# Patient Record
Sex: FEMALE | Race: WHITE | Employment: OTHER | URBAN - METROPOLITAN AREA
[De-identification: names, ages, dates, MRNs, and addresses within clinical notes are randomized per-mention and may not be internally consistent; named-entity substitution may affect disease eponyms.]

---

## 2017-01-09 RX ORDER — LABETALOL 100 MG/1
TABLET, FILM COATED ORAL
Qty: 180 TABLET | Refills: 0 | Status: SHIPPED | OUTPATIENT
Start: 2017-01-09 | End: 2017-01-10 | Stop reason: SDUPTHER

## 2017-01-10 ENCOUNTER — OFFICE VISIT (OUTPATIENT)
Dept: FAMILY MEDICINE CLINIC | Age: 72
End: 2017-01-10

## 2017-01-10 VITALS
HEART RATE: 72 BPM | DIASTOLIC BLOOD PRESSURE: 84 MMHG | HEIGHT: 62 IN | SYSTOLIC BLOOD PRESSURE: 144 MMHG | BODY MASS INDEX: 39.64 KG/M2 | RESPIRATION RATE: 16 BRPM | WEIGHT: 215.4 LBS

## 2017-01-10 DIAGNOSIS — E88.81 METABOLIC SYNDROME: ICD-10-CM

## 2017-01-10 DIAGNOSIS — E66.9 OBESITY (BMI 30-39.9): ICD-10-CM

## 2017-01-10 DIAGNOSIS — I10 HTN, GOAL BELOW 150/90: Primary | ICD-10-CM

## 2017-01-10 PROCEDURE — 99214 OFFICE O/P EST MOD 30 MIN: CPT | Performed by: INTERNAL MEDICINE

## 2017-01-10 RX ORDER — HYDROCHLOROTHIAZIDE 12.5 MG/1
12.5 CAPSULE, GELATIN COATED ORAL DAILY
Qty: 90 CAPSULE | Refills: 3 | Status: SHIPPED | OUTPATIENT
Start: 2017-01-10 | End: 2017-12-13 | Stop reason: SDUPTHER

## 2017-01-10 RX ORDER — AMLODIPINE BESYLATE 10 MG/1
10 TABLET ORAL DAILY
Qty: 90 TABLET | Refills: 3 | Status: SHIPPED | OUTPATIENT
Start: 2017-01-10 | End: 2017-12-13 | Stop reason: SDUPTHER

## 2017-01-10 RX ORDER — LABETALOL 100 MG/1
100 TABLET, FILM COATED ORAL 2 TIMES DAILY
Qty: 180 TABLET | Refills: 3 | Status: SHIPPED | OUTPATIENT
Start: 2017-01-10 | End: 2017-06-16 | Stop reason: SDUPTHER

## 2017-01-10 ASSESSMENT — PATIENT HEALTH QUESTIONNAIRE - PHQ9
2. FEELING DOWN, DEPRESSED OR HOPELESS: 0
SUM OF ALL RESPONSES TO PHQ9 QUESTIONS 1 & 2: 0
1. LITTLE INTEREST OR PLEASURE IN DOING THINGS: 0
SUM OF ALL RESPONSES TO PHQ QUESTIONS 1-9: 0

## 2017-01-10 ASSESSMENT — ENCOUNTER SYMPTOMS
GASTROINTESTINAL NEGATIVE: 1
ALLERGIC/IMMUNOLOGIC NEGATIVE: 1
RESPIRATORY NEGATIVE: 1

## 2017-01-24 ENCOUNTER — HOSPITAL ENCOUNTER (OUTPATIENT)
Dept: OTHER | Age: 72
Discharge: HOME | End: 2017-01-24
Attending: INTERNAL MEDICINE | Admitting: INTERNAL MEDICINE

## 2017-01-24 VITALS — HEIGHT: 64 IN | WEIGHT: 216 LBS | BODY MASS INDEX: 36.88 KG/M2

## 2017-06-16 RX ORDER — LABETALOL 100 MG/1
100 TABLET, FILM COATED ORAL 2 TIMES DAILY
Qty: 60 TABLET | Refills: 0 | Status: SHIPPED | OUTPATIENT
Start: 2017-06-16 | End: 2017-07-30 | Stop reason: SDUPTHER

## 2017-07-31 RX ORDER — LABETALOL 100 MG/1
TABLET, FILM COATED ORAL
Qty: 60 TABLET | Refills: 1 | Status: SHIPPED | OUTPATIENT
Start: 2017-07-31 | End: 2017-08-28 | Stop reason: SDUPTHER

## 2017-08-28 ENCOUNTER — OFFICE VISIT (OUTPATIENT)
Dept: FAMILY MEDICINE CLINIC | Age: 72
End: 2017-08-28

## 2017-08-28 VITALS
WEIGHT: 200.4 LBS | SYSTOLIC BLOOD PRESSURE: 122 MMHG | HEIGHT: 64 IN | OXYGEN SATURATION: 98 % | DIASTOLIC BLOOD PRESSURE: 80 MMHG | RESPIRATION RATE: 16 BRPM | BODY MASS INDEX: 34.21 KG/M2 | HEART RATE: 65 BPM

## 2017-08-28 DIAGNOSIS — E88.81 METABOLIC SYNDROME: ICD-10-CM

## 2017-08-28 DIAGNOSIS — I10 HTN, GOAL BELOW 150/90: Primary | ICD-10-CM

## 2017-08-28 DIAGNOSIS — Z86.718 HISTORY OF DVT OF LOWER EXTREMITY: ICD-10-CM

## 2017-08-28 DIAGNOSIS — E66.9 OBESITY (BMI 30-39.9): ICD-10-CM

## 2017-08-28 PROCEDURE — 99214 OFFICE O/P EST MOD 30 MIN: CPT | Performed by: INTERNAL MEDICINE

## 2017-08-28 RX ORDER — LABETALOL 100 MG/1
100 TABLET, FILM COATED ORAL 2 TIMES DAILY
Qty: 180 TABLET | Refills: 3 | Status: SHIPPED | OUTPATIENT
Start: 2017-08-28 | End: 2018-04-06 | Stop reason: SDUPTHER

## 2017-08-28 ASSESSMENT — ENCOUNTER SYMPTOMS
ALLERGIC/IMMUNOLOGIC NEGATIVE: 1
RESPIRATORY NEGATIVE: 1

## 2017-12-13 RX ORDER — AMLODIPINE BESYLATE 10 MG/1
10 TABLET ORAL DAILY
Qty: 90 TABLET | Refills: 1 | Status: SHIPPED | OUTPATIENT
Start: 2017-12-13 | End: 2018-04-06 | Stop reason: SDUPTHER

## 2017-12-13 RX ORDER — HYDROCHLOROTHIAZIDE 12.5 MG/1
12.5 CAPSULE, GELATIN COATED ORAL DAILY
Qty: 90 CAPSULE | Refills: 1 | Status: SHIPPED | OUTPATIENT
Start: 2017-12-13 | End: 2018-04-06 | Stop reason: SDUPTHER

## 2018-04-09 RX ORDER — LABETALOL 100 MG/1
TABLET, FILM COATED ORAL
Qty: 180 TABLET | Refills: 1 | Status: SHIPPED | OUTPATIENT
Start: 2018-04-09 | End: 2019-01-26 | Stop reason: SDUPTHER

## 2018-04-09 RX ORDER — AMLODIPINE BESYLATE 10 MG/1
10 TABLET ORAL DAILY
Qty: 90 TABLET | Refills: 1 | Status: SHIPPED | OUTPATIENT
Start: 2018-04-09 | End: 2018-10-09 | Stop reason: SDUPTHER

## 2018-04-09 RX ORDER — HYDROCHLOROTHIAZIDE 12.5 MG/1
12.5 CAPSULE, GELATIN COATED ORAL DAILY
Qty: 90 CAPSULE | Refills: 1 | Status: SHIPPED | OUTPATIENT
Start: 2018-04-09 | End: 2018-10-11 | Stop reason: SDUPTHER

## 2018-04-12 ENCOUNTER — OFFICE VISIT (OUTPATIENT)
Dept: FAMILY MEDICINE CLINIC | Age: 73
End: 2018-04-12

## 2018-04-12 VITALS
OXYGEN SATURATION: 96 % | HEART RATE: 63 BPM | BODY MASS INDEX: 34.72 KG/M2 | HEIGHT: 64 IN | WEIGHT: 203.4 LBS | SYSTOLIC BLOOD PRESSURE: 144 MMHG | DIASTOLIC BLOOD PRESSURE: 84 MMHG | RESPIRATION RATE: 18 BRPM

## 2018-04-12 DIAGNOSIS — I10 HTN, GOAL BELOW 150/90: ICD-10-CM

## 2018-04-12 DIAGNOSIS — R53.83 FATIGUE, UNSPECIFIED TYPE: Primary | ICD-10-CM

## 2018-04-12 DIAGNOSIS — E88.81 METABOLIC SYNDROME: ICD-10-CM

## 2018-04-12 DIAGNOSIS — E78.6 LOW LEVEL OF HIGH DENSITY LIPOPROTEIN (HDL): ICD-10-CM

## 2018-04-12 LAB
ALBUMIN SERPL-MCNC: 4.3 G/DL (ref 3.4–5)
ALP BLD-CCNC: 94 U/L (ref 40–129)
ALT SERPL-CCNC: 13 U/L (ref 10–40)
ANION GAP SERPL CALCULATED.3IONS-SCNC: 15 MMOL/L (ref 3–16)
AST SERPL-CCNC: 15 U/L (ref 15–37)
BASOPHILS ABSOLUTE: 0.1 K/UL (ref 0–0.2)
BASOPHILS RELATIVE PERCENT: 0.9 %
BILIRUB SERPL-MCNC: 0.5 MG/DL (ref 0–1)
BILIRUBIN DIRECT: <0.2 MG/DL (ref 0–0.3)
BILIRUBIN, INDIRECT: NORMAL MG/DL (ref 0–1)
BUN BLDV-MCNC: 17 MG/DL (ref 7–20)
CALCIUM SERPL-MCNC: 9.6 MG/DL (ref 8.3–10.6)
CHLORIDE BLD-SCNC: 98 MMOL/L (ref 99–110)
CHOLESTEROL, TOTAL: 172 MG/DL (ref 0–199)
CO2: 27 MMOL/L (ref 21–32)
CREAT SERPL-MCNC: 0.9 MG/DL (ref 0.6–1.2)
EOSINOPHILS ABSOLUTE: 0.2 K/UL (ref 0–0.6)
EOSINOPHILS RELATIVE PERCENT: 2.9 %
GFR AFRICAN AMERICAN: >60
GFR NON-AFRICAN AMERICAN: >60
GLUCOSE BLD-MCNC: 93 MG/DL (ref 70–99)
HCT VFR BLD CALC: 45.2 % (ref 36–48)
HDLC SERPL-MCNC: 48 MG/DL (ref 40–60)
HEMOGLOBIN: 15.2 G/DL (ref 12–16)
LDL CHOLESTEROL CALCULATED: 105 MG/DL
LYMPHOCYTES ABSOLUTE: 2.1 K/UL (ref 1–5.1)
LYMPHOCYTES RELATIVE PERCENT: 29.3 %
MCH RBC QN AUTO: 28 PG (ref 26–34)
MCHC RBC AUTO-ENTMCNC: 33.6 G/DL (ref 31–36)
MCV RBC AUTO: 83.6 FL (ref 80–100)
MONOCYTES ABSOLUTE: 0.5 K/UL (ref 0–1.3)
MONOCYTES RELATIVE PERCENT: 6.7 %
NEUTROPHILS ABSOLUTE: 4.3 K/UL (ref 1.7–7.7)
NEUTROPHILS RELATIVE PERCENT: 60.2 %
PDW BLD-RTO: 15.3 % (ref 12.4–15.4)
PLATELET # BLD: 254 K/UL (ref 135–450)
PMV BLD AUTO: 8.9 FL (ref 5–10.5)
POTASSIUM SERPL-SCNC: 4.2 MMOL/L (ref 3.5–5.1)
RBC # BLD: 5.41 M/UL (ref 4–5.2)
SODIUM BLD-SCNC: 140 MMOL/L (ref 136–145)
TOTAL PROTEIN: 7.1 G/DL (ref 6.4–8.2)
TRIGL SERPL-MCNC: 95 MG/DL (ref 0–150)
TSH SERPL DL<=0.05 MIU/L-ACNC: 2.61 UIU/ML (ref 0.27–4.2)
VLDLC SERPL CALC-MCNC: 19 MG/DL
WBC # BLD: 7.2 K/UL (ref 4–11)

## 2018-04-12 PROCEDURE — 1123F ACP DISCUSS/DSCN MKR DOCD: CPT | Performed by: INTERNAL MEDICINE

## 2018-04-12 PROCEDURE — 1090F PRES/ABSN URINE INCON ASSESS: CPT | Performed by: INTERNAL MEDICINE

## 2018-04-12 PROCEDURE — 3014F SCREEN MAMMO DOC REV: CPT | Performed by: INTERNAL MEDICINE

## 2018-04-12 PROCEDURE — 1036F TOBACCO NON-USER: CPT | Performed by: INTERNAL MEDICINE

## 2018-04-12 PROCEDURE — G8400 PT W/DXA NO RESULTS DOC: HCPCS | Performed by: INTERNAL MEDICINE

## 2018-04-12 PROCEDURE — 99214 OFFICE O/P EST MOD 30 MIN: CPT | Performed by: INTERNAL MEDICINE

## 2018-04-12 PROCEDURE — 36415 COLL VENOUS BLD VENIPUNCTURE: CPT | Performed by: INTERNAL MEDICINE

## 2018-04-12 PROCEDURE — G8427 DOCREV CUR MEDS BY ELIG CLIN: HCPCS | Performed by: INTERNAL MEDICINE

## 2018-04-12 PROCEDURE — 4040F PNEUMOC VAC/ADMIN/RCVD: CPT | Performed by: INTERNAL MEDICINE

## 2018-04-12 PROCEDURE — 3017F COLORECTAL CA SCREEN DOC REV: CPT | Performed by: INTERNAL MEDICINE

## 2018-04-12 PROCEDURE — G8417 CALC BMI ABV UP PARAM F/U: HCPCS | Performed by: INTERNAL MEDICINE

## 2018-04-12 ASSESSMENT — ENCOUNTER SYMPTOMS
RESPIRATORY NEGATIVE: 1
ALLERGIC/IMMUNOLOGIC NEGATIVE: 1
GASTROINTESTINAL NEGATIVE: 1

## 2018-07-12 ENCOUNTER — OFFICE VISIT (OUTPATIENT)
Dept: FAMILY MEDICINE CLINIC | Age: 73
End: 2018-07-12

## 2018-07-12 VITALS
RESPIRATION RATE: 12 BRPM | HEIGHT: 64 IN | SYSTOLIC BLOOD PRESSURE: 137 MMHG | DIASTOLIC BLOOD PRESSURE: 72 MMHG | TEMPERATURE: 98.7 F | HEART RATE: 60 BPM | BODY MASS INDEX: 34.52 KG/M2 | OXYGEN SATURATION: 100 % | WEIGHT: 202.2 LBS

## 2018-07-12 DIAGNOSIS — Z00.00 ROUTINE GENERAL MEDICAL EXAMINATION AT A HEALTH CARE FACILITY: Primary | ICD-10-CM

## 2018-07-12 DIAGNOSIS — Z12.39 SCREENING FOR BREAST CANCER: ICD-10-CM

## 2018-07-12 DIAGNOSIS — Z23 NEED FOR PNEUMOCOCCAL VACCINATION: ICD-10-CM

## 2018-07-12 DIAGNOSIS — Z12.31 ENCOUNTER FOR SCREENING MAMMOGRAM FOR BREAST CANCER: ICD-10-CM

## 2018-07-12 PROCEDURE — 90732 PPSV23 VACC 2 YRS+ SUBQ/IM: CPT | Performed by: PHYSICIAN ASSISTANT

## 2018-07-12 PROCEDURE — G0009 ADMIN PNEUMOCOCCAL VACCINE: HCPCS | Performed by: PHYSICIAN ASSISTANT

## 2018-07-12 PROCEDURE — 4040F PNEUMOC VAC/ADMIN/RCVD: CPT | Performed by: PHYSICIAN ASSISTANT

## 2018-07-12 PROCEDURE — G0438 PPPS, INITIAL VISIT: HCPCS | Performed by: PHYSICIAN ASSISTANT

## 2018-07-12 ASSESSMENT — ANXIETY QUESTIONNAIRES: GAD7 TOTAL SCORE: 0

## 2018-07-12 ASSESSMENT — PATIENT HEALTH QUESTIONNAIRE - PHQ9: SUM OF ALL RESPONSES TO PHQ QUESTIONS 1-9: 0

## 2018-07-12 ASSESSMENT — LIFESTYLE VARIABLES: HOW OFTEN DO YOU HAVE A DRINK CONTAINING ALCOHOL: 0

## 2018-07-12 NOTE — PATIENT INSTRUCTIONS
sandwiches. ¨ Add fruit to yogurt and cereal.  Enjoy food  · You can still eat your favorite foods. You just may need to eat less of them. If your favorite foods are high in fat, salt, and sugar, limit how often you eat them, but do not cut them out entirely. · Eat a wide variety of foods. Make healthy choices when eating out  · The type of restaurant you choose can help you make healthy choices. Even fast-food chains are now offering more low-fat or healthier choices on the menu. · Choose smaller portions, or take half of your meal home. · When eating out, try:  ¨ A veggie pizza with a whole wheat crust or grilled chicken (instead of sausage or pepperoni). ¨ Pasta with roasted vegetables, grilled chicken, or marinara sauce instead of cream sauce. ¨ A vegetable wrap or grilled chicken wrap. ¨ Broiled or poached food instead of fried or breaded items. Make healthy choices easy  · Buy packaged, prewashed, ready-to-eat fresh vegetables and fruits, such as baby carrots, salad mixes, and chopped or shredded broccoli and cauliflower. · Buy packaged, presliced fruits, such as melon or pineapple. · Choose 100% fruit or vegetable juice instead of soda. Limit juice intake to 4 to 6 oz (½ to ¾ cup) a day. · Blend low-fat yogurt, fruit juice, and canned or frozen fruit to make a smoothie for breakfast or a snack. Where can you learn more? Go to https://[email or web address] org and sign in to your [employer] account. Enter Y970 in the [facility] box to learn more about \"Eating Healthy Foods: Care Instructions. \"     If you do not have an account, please click on the \"Sign Up Now\" link. Current as of: May 12, 2017  Content Version: 11.6  © 1788-4299 [employer]. Care instructions adapted under license by [facility] [facility]  If you have questions about a medical condition or this instruction, always ask your healthcare professional. Judith Celis disclaims any warranty or

## 2018-07-12 NOTE — PROGRESS NOTES
Medicare Annual Wellness Visit  Name: Mayelin Oneil Date: 2018   MRN: [id number] Sex: Female   Age: 72 y.o. Ethnicity: Non-/Non    : 1945 Race: Catherine Lo is here for Medicare AWV    Screenings for behavioral, psychosocial and functional/safety risks, and cognitive dysfunction are all negative except as indicated below. These results, as well as other patient data from the [address] form, are documented in Flowsheets linked to this Encounter. No Known Allergies  Prior to Visit Medications    Medication Sig Taking?  Authorizing Provider   amLODIPine (NORVASC) 10 MG tablet TAKE 1 TABLET BY MOUTH  DAILY Yes Caroline Oro MD   hydrochlorothiazide (MICROZIDE) 12.5 MG capsule TAKE 1 CAPSULE BY MOUTH  DAILY Yes TAYLOR Hoover MD   labetalol (NORMODYNE) 100 MG tablet TAKE 1 TABLET BY MOUTH TWO  TIMES DAILY Yes Caroline Oro MD   aspirin 81 MG tablet Take 1 tablet by mouth daily Yes Caroline Oro MD     Past Medical History:   Diagnosis Date    Hx of blood clots     Hypertension      Past Surgical History:   Procedure Laterality Date    COLONOSCOPY      COLONOSCOPY  16    colon polyp    HYSTERECTOMY       Family History   Problem Relation Age of Onset    Cancer Mother     Coronary Art Dis Father        CareTeam (Including outside providers/suppliers regularly involved in providing care):   Patient Care Team:  Caroline Oro MD as PCP - General (Internal Medicine)  Kita Callejas MD as PCP - Hematology/Oncology (Medical Oncology)  Caroline Oro MD as PCP - S Attributed Provider    Wt Readings from Last 3 Encounters:   18 202 lb 3.2 oz (91.7 kg)   18 203 lb 6.4 oz (92.3 kg)   17 200 lb 6.4 oz (90.9 kg)     Vitals:    18 0757   BP: 137/72   Pulse: 60   Resp: 12   Temp: 98.7 °F (37.1 °C)   TempSrc: Oral   SpO2: 100%   Weight: 202 lb 3.2 oz (91.7 kg)   Height: 5' 4\" (1.626 m)       General Appearance: alert and oriented to person, place and time, well developed and well- nourished, in no acute distress  Skin: warm and dry, no rash or erythema  Head: normocephalic and atraumatic  Eyes: pupils equal, round, and reactive to light, extraocular eye movements intact, conjunctivae normal  ENT: tympanic membrane, external ear and left ear canal normal , right ear canal with cerumen, nose without deformity, nasal mucosa and turbinates normal without polyps  Neck: supple and non-tender without mass, no thyromegaly or thyroid nodules, no cervical lymphadenopathy  Pulmonary/Chest: clear to auscultation bilaterally- no wheezes, rales or rhonchi, normal air movement, no respiratory distress  Cardiovascular: normal rate, regular rhythm, normal S1 and S2, no murmurs, rubs, clicks, or gallops, distal pulses intact, no carotid bruits  Abdomen: soft, non-tender, non-distended, normal bowel sounds, no masses or organomegaly  Extremities: no cyanosis, clubbing or edema  Musculoskeletal: normal range of motion, no joint swelling, deformity or tenderness  Neurologic: reflexes normal and symmetric, no cranial nerve deficit, gait, coordination and speech normal    Patient's complete Health Risk Assessment and screening values have been reviewed and are found in Flowsheets. The following problems were reviewed today and where indicated follow up appointments were made and/or referrals ordered. Positive Risk Factor Screenings with Interventions:     Health Habits/Nutrition:  Health Habits/Nutrition  Do you exercise for at least 20 minutes 2-3 times per week?: Yes  Have you lost any weight without trying in the past 3 months?: No  Do you eat fewer than 2 meals per day?: No  Have you seen a dentist within the past year?: (!) No  Body mass index is 34.71 kg/m². Health Habits/Nutrition Interventions:  · Discussed importance of diet full of veggies and fruits and regular exercise.   · Dental exam overdue:  patient encouraged to make appointment with his/her dentist    Hearing/Vision:  Hearing/Vision  Do you or your family notice any trouble with your hearing?: No  Do you have difficulty driving, watching TV, or doing any of your daily activities because of your eyesight?: No  Have you had an eye exam within the past year?: (!) No  Hearing/Vision Interventions:  · None indicated    Safety:  Safety  Do you have working smoke detectors?: (!) No  Have all throw rugs been removed or fastened?: (!) No  Do you have non-slip mats in all bathtubs?: (!) No  Do all of your stairways have a railing or banister?: Yes  Are your doorways, halls and stairs free of clutter?: Yes  Do you always fasten your seatbelt when you are in a car?: Yes  Safety Interventions:  · None indicated- has carpets thruout her apt building. No carpet or rugs in bathroom     Personalized Preventive Plan   Current Health Maintenance Status  Immunization History   Administered Date(s) Administered    Influenza Vaccine, unspecified formulation 09/06/2016    Influenza Whole 09/01/2014    Influenza, High Dose 09/15/2015, 09/11/2017    Pneumococcal 13-valent Conjugate (Leopoldo Oneal) 03/30/2016        Health Maintenance   Topic Date Due    Hepatitis C screen  1945    DTaP/Tdap/Td vaccine (1 - Tdap) 04/21/1966    Shingles Vaccine (1 of 2 - 2 Dose Series) 04/21/1997    Colon cancer screen colonoscopy  04/21/1997    DEXA (modify frequency per FRAX score)  04/21/2012    Pneumococcal low/med risk (2 of 2 - PPSV23) 03/30/2017    Breast cancer screen  02/02/2018    Flu vaccine (1) 09/01/2018    Potassium monitoring  04/12/2019    Creatinine monitoring  04/12/2019    Lipid screen  04/12/2023     Recommendations for Preventive Services Due: see orders. Recommended screening schedule for the next 5-10 years is provided to the patient in written form: see Patient Instructions/AVS.      PLAN:  1. Screening mammogram ordered. 2. Second pneumonia vaccine administered  3.  Discussed diet, exercise, and

## 2018-07-18 ENCOUNTER — HOSPITAL ENCOUNTER (OUTPATIENT)
Dept: WOMENS IMAGING | Age: 73
Discharge: HOME | End: 2018-07-18
Payer: MEDICARE

## 2018-07-18 DIAGNOSIS — Z12.31 VISIT FOR SCREENING MAMMOGRAM: ICD-10-CM

## 2018-07-18 PROCEDURE — 77067 SCR MAMMO BI INCL CAD: CPT

## 2018-07-19 ENCOUNTER — TELEPHONE (OUTPATIENT)
Dept: FAMILY MEDICINE CLINIC | Age: 73
End: 2018-07-19

## 2018-07-19 DIAGNOSIS — R92.8 ABNORMAL MAMMOGRAM: Primary | ICD-10-CM

## 2018-07-19 NOTE — TELEPHONE ENCOUNTER
----- Message from Pepito Mario MD sent at 7/19/2018  1:38 PM EDT -----  incmplete edna. Make sure added views are ordered/done.

## 2018-07-23 ENCOUNTER — HOSPITAL ENCOUNTER (OUTPATIENT)
Dept: WOMENS IMAGING | Age: 73
Discharge: HOME | End: 2018-07-23
Payer: MEDICARE

## 2018-07-23 DIAGNOSIS — R92.8 ABNORMAL MAMMOGRAM: ICD-10-CM

## 2018-07-23 PROCEDURE — 76642 ULTRASOUND BREAST LIMITED: CPT

## 2018-10-09 RX ORDER — AMLODIPINE BESYLATE 10 MG/1
10 TABLET ORAL DAILY
Qty: 90 TABLET | Refills: 3 | Status: SHIPPED | OUTPATIENT
Start: 2018-10-09 | End: 2019-09-28 | Stop reason: SDUPTHER

## 2018-10-11 ENCOUNTER — OFFICE VISIT (OUTPATIENT)
Dept: FAMILY MEDICINE CLINIC | Age: 73
End: 2018-10-11
Payer: MEDICARE

## 2018-10-11 VITALS
DIASTOLIC BLOOD PRESSURE: 76 MMHG | WEIGHT: 194 LBS | BODY MASS INDEX: 33.12 KG/M2 | HEIGHT: 64 IN | RESPIRATION RATE: 16 BRPM | OXYGEN SATURATION: 97 % | SYSTOLIC BLOOD PRESSURE: 128 MMHG | HEART RATE: 59 BPM

## 2018-10-11 DIAGNOSIS — E66.9 OBESITY (BMI 30-39.9): ICD-10-CM

## 2018-10-11 DIAGNOSIS — I10 HTN, GOAL BELOW 150/90: ICD-10-CM

## 2018-10-11 DIAGNOSIS — E88.81 METABOLIC SYNDROME: ICD-10-CM

## 2018-10-11 PROCEDURE — 99213 OFFICE O/P EST LOW 20 MIN: CPT | Performed by: INTERNAL MEDICINE

## 2018-10-11 PROCEDURE — 3017F COLORECTAL CA SCREEN DOC REV: CPT | Performed by: INTERNAL MEDICINE

## 2018-10-11 PROCEDURE — 1036F TOBACCO NON-USER: CPT | Performed by: INTERNAL MEDICINE

## 2018-10-11 PROCEDURE — G8482 FLU IMMUNIZE ORDER/ADMIN: HCPCS | Performed by: INTERNAL MEDICINE

## 2018-10-11 PROCEDURE — 4040F PNEUMOC VAC/ADMIN/RCVD: CPT | Performed by: INTERNAL MEDICINE

## 2018-10-11 PROCEDURE — 1090F PRES/ABSN URINE INCON ASSESS: CPT | Performed by: INTERNAL MEDICINE

## 2018-10-11 PROCEDURE — 1101F PT FALLS ASSESS-DOCD LE1/YR: CPT | Performed by: INTERNAL MEDICINE

## 2018-10-11 PROCEDURE — G8400 PT W/DXA NO RESULTS DOC: HCPCS | Performed by: INTERNAL MEDICINE

## 2018-10-11 PROCEDURE — 1123F ACP DISCUSS/DSCN MKR DOCD: CPT | Performed by: INTERNAL MEDICINE

## 2018-10-11 PROCEDURE — G8417 CALC BMI ABV UP PARAM F/U: HCPCS | Performed by: INTERNAL MEDICINE

## 2018-10-11 PROCEDURE — G8427 DOCREV CUR MEDS BY ELIG CLIN: HCPCS | Performed by: INTERNAL MEDICINE

## 2018-10-11 RX ORDER — HYDROCHLOROTHIAZIDE 12.5 MG/1
12.5 CAPSULE, GELATIN COATED ORAL DAILY
Qty: 90 CAPSULE | Refills: 1 | Status: SHIPPED | OUTPATIENT
Start: 2018-10-11 | End: 2019-02-05 | Stop reason: SDUPTHER

## 2018-10-11 ASSESSMENT — ENCOUNTER SYMPTOMS
ALLERGIC/IMMUNOLOGIC NEGATIVE: 1
GASTROINTESTINAL NEGATIVE: 1
RESPIRATORY NEGATIVE: 1

## 2018-10-11 NOTE — PROGRESS NOTES
Subjective:      Patient ID: Margot Alvarado is a 72 y.o. female. HPI  HTN, goal below 150/90  No change in meds, no c/o with meds, no chest pain, SOB, palpatations, or syncope. Home bp was 110-120s/70s. Obesity (BMI 30-39. 9)  BMI is improved w/ very good wt loss. No real change in diet per pt. Metabolic syndrome  No recent change in diet or wt. Past Medical History:   Diagnosis Date    Hx of blood clots 2015    Hypertension      Current Outpatient Prescriptions   Medication Sig Dispense Refill    hydrochlorothiazide (MICROZIDE) 12.5 MG capsule Take 1 capsule by mouth daily 90 capsule 1    zoster recombinant adjuvanted vaccine (SHINGRIX) 50 MCG SUSR injection Inject 0.5 mLs into the muscle once for 1 dose 0.5 mL 0    Tdap (ADACEL) 5-2-15.5 LF-MCG/0.5 injection Inject 0.5 mLs into the muscle once for 1 dose 0.5 mL 0    amLODIPine (NORVASC) 10 MG tablet TAKE 1 TABLET BY MOUTH  DAILY 90 tablet 3    labetalol (NORMODYNE) 100 MG tablet TAKE 1 TABLET BY MOUTH TWO  TIMES DAILY 180 tablet 1    aspirin 81 MG tablet Take 1 tablet by mouth daily 90 tablet 3     No current facility-administered medications for this visit. No Known Allergies  Social History   Substance Use Topics    Smoking status: Never Smoker    Smokeless tobacco: Never Used    Alcohol use 0.0 oz/week      Comment: rarely     Family History   Problem Relation Age of Onset    Cancer Mother     Breast Cancer Mother     Coronary Art Dis Father        Review of Systems   Constitutional: Negative. Respiratory: Negative. Cardiovascular: Negative. Gastrointestinal: Negative. Endocrine: Negative. Genitourinary: Negative. Musculoskeletal: Negative. Skin: Negative. Allergic/Immunologic: Negative. Neurological: Negative. Hematological: Negative.       Vitals:    10/11/18 0836 10/11/18 0906   BP: 118/72 128/76   Pulse: 59    Resp: 16    SpO2: 97%    Weight: 194 lb (88 kg)    Height: 5' 4\" (1.626 m)

## 2019-01-28 RX ORDER — LABETALOL 100 MG/1
TABLET, FILM COATED ORAL
Qty: 180 TABLET | Refills: 1 | Status: SHIPPED | OUTPATIENT
Start: 2019-01-28 | End: 2019-04-04 | Stop reason: SDUPTHER

## 2019-02-05 RX ORDER — HYDROCHLOROTHIAZIDE 12.5 MG/1
12.5 CAPSULE, GELATIN COATED ORAL DAILY
Qty: 32 CAPSULE | Refills: 5 | Status: SHIPPED | OUTPATIENT
Start: 2019-02-05 | End: 2019-09-14 | Stop reason: SDUPTHER

## 2019-03-07 ENCOUNTER — OFFICE VISIT (OUTPATIENT)
Dept: FAMILY MEDICINE CLINIC | Age: 74
End: 2019-03-07
Payer: MEDICARE

## 2019-03-07 VITALS
BODY MASS INDEX: 34.76 KG/M2 | SYSTOLIC BLOOD PRESSURE: 142 MMHG | RESPIRATION RATE: 16 BRPM | WEIGHT: 203.6 LBS | HEIGHT: 64 IN | OXYGEN SATURATION: 98 % | HEART RATE: 67 BPM | DIASTOLIC BLOOD PRESSURE: 80 MMHG | TEMPERATURE: 97.7 F

## 2019-03-07 DIAGNOSIS — I10 ESSENTIAL HYPERTENSION: Primary | ICD-10-CM

## 2019-03-07 PROCEDURE — 1101F PT FALLS ASSESS-DOCD LE1/YR: CPT | Performed by: INTERNAL MEDICINE

## 2019-03-07 PROCEDURE — 4040F PNEUMOC VAC/ADMIN/RCVD: CPT | Performed by: INTERNAL MEDICINE

## 2019-03-07 PROCEDURE — 99214 OFFICE O/P EST MOD 30 MIN: CPT | Performed by: INTERNAL MEDICINE

## 2019-03-07 PROCEDURE — 3017F COLORECTAL CA SCREEN DOC REV: CPT | Performed by: INTERNAL MEDICINE

## 2019-03-07 PROCEDURE — 1090F PRES/ABSN URINE INCON ASSESS: CPT | Performed by: INTERNAL MEDICINE

## 2019-03-07 PROCEDURE — 1036F TOBACCO NON-USER: CPT | Performed by: INTERNAL MEDICINE

## 2019-03-07 PROCEDURE — G8400 PT W/DXA NO RESULTS DOC: HCPCS | Performed by: INTERNAL MEDICINE

## 2019-03-07 PROCEDURE — 1123F ACP DISCUSS/DSCN MKR DOCD: CPT | Performed by: INTERNAL MEDICINE

## 2019-03-07 PROCEDURE — G8427 DOCREV CUR MEDS BY ELIG CLIN: HCPCS | Performed by: INTERNAL MEDICINE

## 2019-03-07 PROCEDURE — G8510 SCR DEP NEG, NO PLAN REQD: HCPCS | Performed by: INTERNAL MEDICINE

## 2019-03-07 PROCEDURE — G8482 FLU IMMUNIZE ORDER/ADMIN: HCPCS | Performed by: INTERNAL MEDICINE

## 2019-03-07 PROCEDURE — G8417 CALC BMI ABV UP PARAM F/U: HCPCS | Performed by: INTERNAL MEDICINE

## 2019-03-07 ASSESSMENT — PATIENT HEALTH QUESTIONNAIRE - PHQ9
SUM OF ALL RESPONSES TO PHQ QUESTIONS 1-9: 0
SUM OF ALL RESPONSES TO PHQ QUESTIONS 1-9: 0
SUM OF ALL RESPONSES TO PHQ9 QUESTIONS 1 & 2: 0
1. LITTLE INTEREST OR PLEASURE IN DOING THINGS: 0
2. FEELING DOWN, DEPRESSED OR HOPELESS: 0

## 2019-04-04 ENCOUNTER — TELEPHONE (OUTPATIENT)
Dept: FAMILY MEDICINE CLINIC | Age: 74
End: 2019-04-04

## 2019-04-04 RX ORDER — LABETALOL 100 MG/1
100 TABLET, FILM COATED ORAL 2 TIMES DAILY
Qty: 180 TABLET | Refills: 1 | Status: SHIPPED | OUTPATIENT
Start: 2019-04-04 | End: 2019-08-10 | Stop reason: SDUPTHER

## 2019-04-04 NOTE — TELEPHONE ENCOUNTER
pts nurse called in and stated she is accidentally taking three of the amLODIPine (NORVASC) 10 MG tablet daily and she wanted to clarify the dosage. Also she has not been taking the labetalol (NORMODYNE) 100 MG tablet, she is out and requesting a refill. Patient requesting a medication refill.   Medication labetalol (NORMODYNE) 100 MG tablet   Pharmacy [facility]  Last office visit: 3/7/19  Next office visit: 4/11/2019

## 2019-04-11 ENCOUNTER — OFFICE VISIT (OUTPATIENT)
Dept: FAMILY MEDICINE CLINIC | Age: 74
End: 2019-04-11
Payer: MEDICARE

## 2019-04-11 VITALS
SYSTOLIC BLOOD PRESSURE: 126 MMHG | BODY MASS INDEX: 34.83 KG/M2 | HEIGHT: 64 IN | HEART RATE: 69 BPM | OXYGEN SATURATION: 97 % | RESPIRATION RATE: 16 BRPM | WEIGHT: 204 LBS | DIASTOLIC BLOOD PRESSURE: 76 MMHG

## 2019-04-11 DIAGNOSIS — R53.83 FATIGUE, UNSPECIFIED TYPE: ICD-10-CM

## 2019-04-11 DIAGNOSIS — I26.99 PULMONARY EMBOLISM, BILATERAL (HCC): ICD-10-CM

## 2019-04-11 DIAGNOSIS — E88.81 METABOLIC SYNDROME: ICD-10-CM

## 2019-04-11 DIAGNOSIS — E66.9 OBESITY (BMI 30-39.9): ICD-10-CM

## 2019-04-11 DIAGNOSIS — Z00.00 MEDICARE ANNUAL WELLNESS VISIT, SUBSEQUENT: Primary | ICD-10-CM

## 2019-04-11 DIAGNOSIS — E28.39 ESTROGEN DEFICIENCY: ICD-10-CM

## 2019-04-11 DIAGNOSIS — I10 HTN, GOAL BELOW 150/90: ICD-10-CM

## 2019-04-11 DIAGNOSIS — E78.6 LOW LEVEL OF HIGH DENSITY LIPOPROTEIN (HDL): ICD-10-CM

## 2019-04-11 DIAGNOSIS — Z11.59 NEED FOR HEPATITIS C SCREENING TEST: ICD-10-CM

## 2019-04-11 LAB
ALBUMIN SERPL-MCNC: 4.2 G/DL (ref 3.4–5)
ALP BLD-CCNC: 102 U/L (ref 40–129)
ALT SERPL-CCNC: 13 U/L (ref 10–40)
ANION GAP SERPL CALCULATED.3IONS-SCNC: 14 MMOL/L (ref 3–16)
AST SERPL-CCNC: 17 U/L (ref 15–37)
BILIRUB SERPL-MCNC: 0.3 MG/DL (ref 0–1)
BILIRUBIN DIRECT: <0.2 MG/DL (ref 0–0.3)
BILIRUBIN, INDIRECT: NORMAL MG/DL (ref 0–1)
BUN BLDV-MCNC: 24 MG/DL (ref 7–20)
CALCIUM SERPL-MCNC: 9.5 MG/DL (ref 8.3–10.6)
CHLORIDE BLD-SCNC: 98 MMOL/L (ref 99–110)
CHOLESTEROL, TOTAL: 159 MG/DL (ref 0–199)
CO2: 28 MMOL/L (ref 21–32)
CREAT SERPL-MCNC: 0.9 MG/DL (ref 0.6–1.2)
GFR AFRICAN AMERICAN: >60
GFR NON-AFRICAN AMERICAN: >60
GLUCOSE BLD-MCNC: 109 MG/DL (ref 70–99)
HCT VFR BLD CALC: 43.1 % (ref 36–48)
HDLC SERPL-MCNC: 48 MG/DL (ref 40–60)
HEMOGLOBIN: 14.2 G/DL (ref 12–16)
HEPATITIS C ANTIBODY INTERPRETATION: NORMAL
LDL CHOLESTEROL CALCULATED: 97 MG/DL
MCH RBC QN AUTO: 27.5 PG (ref 26–34)
MCHC RBC AUTO-ENTMCNC: 32.8 G/DL (ref 31–36)
MCV RBC AUTO: 84 FL (ref 80–100)
PDW BLD-RTO: 15.4 % (ref 12.4–15.4)
PHOSPHORUS: 3.5 MG/DL (ref 2.5–4.9)
PLATELET # BLD: 301 K/UL (ref 135–450)
PMV BLD AUTO: 8.4 FL (ref 5–10.5)
POTASSIUM SERPL-SCNC: 3.7 MMOL/L (ref 3.5–5.1)
RBC # BLD: 5.14 M/UL (ref 4–5.2)
SODIUM BLD-SCNC: 140 MMOL/L (ref 136–145)
TOTAL PROTEIN: 7.1 G/DL (ref 6.4–8.2)
TRIGL SERPL-MCNC: 72 MG/DL (ref 0–150)
TSH SERPL DL<=0.05 MIU/L-ACNC: 3.55 UIU/ML (ref 0.27–4.2)
VLDLC SERPL CALC-MCNC: 14 MG/DL
WBC # BLD: 8.3 K/UL (ref 4–11)

## 2019-04-11 PROCEDURE — 36415 COLL VENOUS BLD VENIPUNCTURE: CPT | Performed by: INTERNAL MEDICINE

## 2019-04-11 PROCEDURE — G0439 PPPS, SUBSEQ VISIT: HCPCS | Performed by: INTERNAL MEDICINE

## 2019-04-11 PROCEDURE — 4040F PNEUMOC VAC/ADMIN/RCVD: CPT | Performed by: INTERNAL MEDICINE

## 2019-04-11 PROCEDURE — 99214 OFFICE O/P EST MOD 30 MIN: CPT | Performed by: INTERNAL MEDICINE

## 2019-04-11 ASSESSMENT — PATIENT HEALTH QUESTIONNAIRE - PHQ9
SUM OF ALL RESPONSES TO PHQ QUESTIONS 1-9: 0
SUM OF ALL RESPONSES TO PHQ QUESTIONS 1-9: 0

## 2019-04-11 ASSESSMENT — ENCOUNTER SYMPTOMS
EYES NEGATIVE: 1
RESPIRATORY NEGATIVE: 1
ALLERGIC/IMMUNOLOGIC NEGATIVE: 1
GASTROINTESTINAL NEGATIVE: 1

## 2019-04-11 ASSESSMENT — ANXIETY QUESTIONNAIRES: GAD7 TOTAL SCORE: 0

## 2019-04-11 ASSESSMENT — LIFESTYLE VARIABLES: HOW OFTEN DO YOU HAVE A DRINK CONTAINING ALCOHOL: 0

## 2019-04-11 NOTE — PROGRESS NOTES
Subjective:      Patient ID: Cameron Mckeon is a 72 y.o. female. HPI Medicare annual wellness visit, subsequent  Mammo: 7/18/2018. Pap: s/p hysterectomy/oopherectomy   Colonoscopy: 4/2016, Tubular adenoma. rechx 5 yrs  DEXA: due  Eye: 4/2019. Hearing: passed in office exam  Immunnization: up to date except for Shingrix. Rx given. MMSE: 4/4 clock draw, 1/3 words, 27/30 MMSE  Get Up and Go: passed  Tob: nonsmoker, never  ETOH: rare  Caffeine: moderate  Cardiac Risk Assessment: labs pending. Living will:  yes  Medical power of : yes    HTN, goal below 150/90  No change in meds, no c/o with meds, no chest pain, SOB, palpatations, or syncope. Home bp was 110-120s/70s. Obesity (BMI 30-39. 9)  BMI back up. Diet fair, gx occas. Metabolic syndrome  No recent change in diet or wt. Pulmonary embolism, bilateral (HCC)  Heme w/u showed no reason to continue Xarelto and was stopped in 1/2016. On Asa 81 mg w/ no sign of DVT. No c/o of increased SOB or CP. Low level of high density lipoprotein (HDL)  Has had low HDLs. No further wt loss. Occasional Gx at this time. Past Medical History:   Diagnosis Date    Hx of blood clots 2015    Hypertension      Current Outpatient Medications   Medication Sig Dispense Refill    labetalol (NORMODYNE) 100 MG tablet Take 1 tablet by mouth 2 times daily 180 tablet 1    hydrochlorothiazide (MICROZIDE) 12.5 MG capsule TAKE 1 CAPSULE BY MOUTH  DAILY 32 capsule 5    amLODIPine (NORVASC) 10 MG tablet TAKE 1 TABLET BY MOUTH  DAILY 90 tablet 3    aspirin 81 MG tablet Take 1 tablet by mouth daily 90 tablet 3     No current facility-administered medications for this visit. No Known Allergies  Social History     Tobacco Use    Smoking status: Never Smoker    Smokeless tobacco: Never Used   Substance Use Topics    Alcohol use:  Yes     Alcohol/week: 0.0 oz     Comment: rarely     Family History   Problem Relation Age of Onset    Cancer Mother     Breast Cancer Mother     Coronary Art Dis Father        Review of Systems   Constitutional: Negative. HENT: Negative. Eyes: Negative. Respiratory: Negative. Cardiovascular: Negative. Gastrointestinal: Negative. Endocrine: Negative. Genitourinary: Negative. Musculoskeletal: Negative. Skin: Negative. Allergic/Immunologic: Negative. Neurological: Negative. Hematological: Negative. Psychiatric/Behavioral: Negative. Vitals:    04/11/19 0807 04/11/19 0841   BP: 122/76 126/76   Pulse: 69    Resp: 16    SpO2: 97%    Weight: 204 lb (92.5 kg)    Height: 5' 4\" (1.626 m)      Objective:   Physical Exam   Constitutional: She is oriented to person, place, and time. Vital signs are normal. She appears well-developed and well-nourished. Non-toxic appearance. She does not have a sickly appearance. She does not appear ill. No distress. HENT:   Head: Normocephalic and atraumatic. Right Ear: Hearing, tympanic membrane, external ear and ear canal normal.   Left Ear: Hearing, tympanic membrane, external ear and ear canal normal.   Nose: Nose normal. Right sinus exhibits no maxillary sinus tenderness and no frontal sinus tenderness. Left sinus exhibits no maxillary sinus tenderness and no frontal sinus tenderness. Mouth/Throat: Uvula is midline, oropharynx is clear and moist and mucous membranes are normal. No oropharyngeal exudate. Eyes: Pupils are equal, round, and reactive to light. Conjunctivae, EOM and lids are normal.   Fundoscopic exam:       The right eye shows no arteriolar narrowing, no AV nicking, no exudate, no hemorrhage and no papilledema. The right eye shows no red reflex and no venous pulsations. The left eye shows no arteriolar narrowing, no AV nicking, no exudate, no hemorrhage and no papilledema. The left eye shows no red reflex and no venous pulsations.    Neck: Trachea normal, normal range of motion, full passive range of motion without pain and phonation normal. Neck supple. Normal carotid pulses, no hepatojugular reflux and no JVD present. Carotid bruit is not present. No tracheal deviation present. No thyromegaly present. Cardiovascular: Normal rate, regular rhythm, normal heart sounds, intact distal pulses and normal pulses. No extrasystoles are present. PMI is not displaced. Exam reveals no gallop, no friction rub and no decreased pulses. No murmur heard. Pulses:       Carotid pulses are 2+ on the right side, and 2+ on the left side. Radial pulses are 2+ on the right side, and 2+ on the left side. Femoral pulses are 2+ on the right side, and 2+ on the left side. Popliteal pulses are 2+ on the right side, and 2+ on the left side. Dorsalis pedis pulses are 2+ on the right side, and 2+ on the left side. Posterior tibial pulses are 2+ on the right side, and 2+ on the left side. Pulmonary/Chest: Effort normal and breath sounds normal. No stridor. No respiratory distress. She has no decreased breath sounds. She has no wheezes. She has no rhonchi. She has no rales. She exhibits no tenderness. No breast tenderness, discharge or bleeding. No breast exam.   Abdominal: Soft. Normal appearance, normal aorta and bowel sounds are normal. She exhibits no shifting dullness, no distension, no pulsatile liver, no fluid wave, no abdominal bruit, no ascites, no pulsatile midline mass and no mass. There is no hepatosplenomegaly. There is no tenderness. There is no rebound, no guarding and no CVA tenderness. No hernia. Hernia confirmed negative in the ventral area. Genitourinary: No breast tenderness, discharge or bleeding. Pelvic exam was performed with patient supine. Genitourinary Comments: NE   Musculoskeletal: Normal range of motion. She exhibits no edema or tenderness. Lymphadenopathy:        Head (right side): No submental, no submandibular, no tonsillar, no preauricular, no posterior auricular and no occipital adenopathy present. Head (left side): No submental, no submandibular, no tonsillar, no preauricular, no posterior auricular and no occipital adenopathy present. She has no cervical adenopathy. She has no axillary adenopathy. Right: No inguinal, no supraclavicular and no epitrochlear adenopathy present. Left: No inguinal, no supraclavicular and no epitrochlear adenopathy present. Neurological: She is alert and oriented to person, place, and time. She has normal strength and normal reflexes. She is not disoriented. She displays no atrophy, no tremor and normal reflexes. No cranial nerve deficit or sensory deficit. She exhibits normal muscle tone. She displays a negative Romberg sign. She displays no seizure activity. Gait normal. She displays no Babinski's sign on the right side. She displays no Babinski's sign on the left side. Reflex Scores:       Tricep reflexes are 2+ on the right side and 2+ on the left side. Bicep reflexes are 2+ on the right side and 2+ on the left side. Brachioradialis reflexes are 2+ on the right side and 2+ on the left side. Patellar reflexes are 2+ on the right side and 2+ on the left side. Achilles reflexes are 2+ on the right side and 2+ on the left side. Skin: Skin is warm, dry and intact. No abrasion and no rash noted. She is not diaphoretic. No cyanosis or erythema. No pallor. Nails show no clubbing. Psychiatric: She has a normal mood and affect. Her speech is normal and behavior is normal. Judgment and thought content normal. Cognition and memory are normal.   MMSE 27/30       Assessment:      Problem   Medicare Annual Wellness Visit, Subsequent   Low Level of High Density Lipoprotein (Hdl). No change in diet or wt. Pulmonary Embolism, Bilateral (Hcc). No reoccurrence w/ ASA. Metabolic Syndrome. No improvement in diet or Gx. Htn, Goal Below 150/90. Good /w meds. Obesity (BMI 30-39.9). No improvement in diet or Gx.          Plan:      All above

## 2019-04-11 NOTE — ASSESSMENT & PLAN NOTE
Mammo: 7/18/2018. Pap: s/p hysterectomy/oopherectomy   Colonoscopy: 4/2016, Tubular adenoma. rechx 5 yrs  DEXA: due  Eye: 4/2019. Hearing: passed in office exam  Immunnization: up to date except for Shingrix. Rx given. MMSE: 4/4 clock draw, 1/3 words, 27/30 MMSE  Get Up and Go: passed  Tob: nonsmoker, never  ETOH: rare  Caffeine: moderate  Cardiac Risk Assessment: labs pending.   Living will:  yes  Medical power of : yes

## 2019-04-11 NOTE — ASSESSMENT & PLAN NOTE
Heme w/u showed no reason to continue Xarelto and was stopped in 1/2016. On Asa 81 mg w/ no sign of DVT. No c/o of increased SOB or CP.

## 2019-04-29 ENCOUNTER — HOSPITAL ENCOUNTER (OUTPATIENT)
Dept: WOMENS IMAGING | Age: 74
Discharge: HOME | End: 2019-04-29
Payer: MEDICARE

## 2019-04-29 DIAGNOSIS — E28.39 ESTROGEN DEFICIENCY: ICD-10-CM

## 2019-04-29 PROCEDURE — 77080 DXA BONE DENSITY AXIAL: CPT

## 2019-04-30 ENCOUNTER — TELEPHONE (OUTPATIENT)
Dept: FAMILY MEDICINE CLINIC | Age: 74
End: 2019-04-30

## 2019-04-30 NOTE — TELEPHONE ENCOUNTER
RE: Dexa bone density   Patient informed of the following and understands. Has no further questions. Mild bone loss. To improve diet and lose some weight. Vit D and Ca supplements.  Regular exercise as tolerated

## 2019-04-30 NOTE — RESULT ENCOUNTER NOTE
Gaby Turner 27 minutes ago (1:01 PM)        RE: Dexa bone density   Patient informed of the following and understands. Has no further questions. Mild bone loss. To improve diet and lose some weight. Vit D and Ca supplements.  Regular exercise as tolerated

## 2019-05-11 PROBLEM — Z00.00 MEDICARE ANNUAL WELLNESS VISIT, SUBSEQUENT: Status: RESOLVED | Noted: 2019-04-11 | Resolved: 2019-05-11

## 2019-08-12 RX ORDER — LABETALOL 100 MG/1
TABLET, FILM COATED ORAL
Qty: 180 TABLET | Refills: 1 | Status: SHIPPED | OUTPATIENT
Start: 2019-08-12 | End: 2019-09-24 | Stop reason: SDUPTHER

## 2019-08-22 ENCOUNTER — HOSPITAL ENCOUNTER (EMERGENCY)
Age: 74
Discharge: OTHER INSTITUTION | End: 2019-08-22
Attending: EMERGENCY MEDICINE
Payer: MEDICARE

## 2019-08-22 ENCOUNTER — APPOINTMENT (OUTPATIENT)
Dept: CT IMAGING | Age: 74
End: 2019-08-22
Payer: MEDICARE

## 2019-08-22 VITALS
HEART RATE: 89 BPM | HEIGHT: 63 IN | TEMPERATURE: 98 F | WEIGHT: 195 LBS | OXYGEN SATURATION: 95 % | BODY MASS INDEX: 34.55 KG/M2 | RESPIRATION RATE: 15 BRPM | DIASTOLIC BLOOD PRESSURE: 86 MMHG | SYSTOLIC BLOOD PRESSURE: 163 MMHG

## 2019-08-22 DIAGNOSIS — S00.83XA TRAUMATIC HEMATOMA OF FOREHEAD, INITIAL ENCOUNTER: Primary | ICD-10-CM
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
## 2019-08-22 PROCEDURE — 99283 EMERGENCY DEPT VISIT LOW MDM: CPT

## 2019-08-22 PROCEDURE — 70450 CT HEAD/BRAIN W/O DYE: CPT

## 2019-08-22 PROCEDURE — 6370000000 HC RX 637 (ALT 250 FOR IP): Performed by: PHYSICIAN ASSISTANT

## 2019-08-22 PROCEDURE — 2500000003 HC RX 250 WO HCPCS: Performed by: PHYSICIAN ASSISTANT

## 2019-08-22 PROCEDURE — 4500000023 HC ED LEVEL 3 PROCEDURE

## 2019-08-22 RX ORDER — BACITRACIN ZINC AND POLYMYXIN B SULFATE 500; 1000 [USP'U]/G; [USP'U]/G
OINTMENT TOPICAL ONCE
Status: COMPLETED | OUTPATIENT
Start: 2019-08-22 | End: 2019-08-22

## 2019-08-22 RX ORDER — LATANOPROST 50 UG/ML
SOLUTION/ DROPS OPHTHALMIC
COMMUNITY
Start: 2019-06-06 | End: 2019-08-22

## 2019-08-22 RX ORDER — LIDOCAINE HYDROCHLORIDE AND EPINEPHRINE 10; 10 MG/ML; UG/ML
20 INJECTION, SOLUTION INFILTRATION; PERINEURAL ONCE
Status: COMPLETED | OUTPATIENT
Start: 2019-08-22 | End: 2019-08-22

## 2019-08-22 RX ADMIN — LIDOCAINE HYDROCHLORIDE,EPINEPHRINE BITARTRATE 20 ML: 10; .01 INJECTION, SOLUTION INFILTRATION; PERINEURAL at 20:01

## 2019-08-22 RX ADMIN — BACITRACIN ZINC AND POLYMYXIN B SULFATE: at 20:12

## 2019-08-22 ASSESSMENT — PAIN SCALES - GENERAL: PAINLEVEL_OUTOF10: 5

## 2019-08-22 NOTE — ED PROVIDER NOTES
Infection and pain    Alternatives discussed:  No treatment, observation and delayed treatment  Anesthesia (see MAR for exact dosages): Anesthesia method:  Local infiltration    Local anesthetic:  Lidocaine 1% WITH epi  Laceration details:     Location:  Face    Face location:  Nose    Length (cm):  1    Depth (mm):  1  Repair type:     Repair type:  Simple  Treatment:     Area cleansed with:  Saline    Amount of cleaning:  Standard    Irrigation solution:  Sterile saline    Irrigation method:  Syringe and pressure wash  Skin repair:     Repair method:  Tissue adhesive  Approximation:     Approximation:  Close  Post-procedure details:     Dressing:  Open (no dressing)    Patient tolerance of procedure: Tolerated well, no immediate complications        ED Course     Nursing Notes, Past Medical Hx,Past Surgical Hx, Social Hx, Allergies, and Family Hx were reviewed. The patient was given the following medications:  Orders Placed This Encounter   Medications    DISCONTD: Tetanus-Diphth-Acell Pertussis (BOOSTRIX) injection 0.5 mL    lidocaine-EPINEPHrine 1 percent-1:930442 injection 20 mL    bacitracin-polymyxin b (POLYSPORIN) ointment       CONSULTS:  None    MEDICAL DECISION MAKING / ASSESSMENT / [place] Jocelynn is a 69 y.o. female presenting with hematoma to right side of forehead, abrasion to nose, and abrasions to right shin after mechanical fall. Per chart review, tetanus was updated this past year, so was not given today. Wounds were thoroughly cleaned. Patient had a normal neurologic exam, patient is on a baby aspirin daily and clearly hit her head with a large frontal hematoma, so CT head was ordered and showed no acute intracranial abnormality. Small wound to nasal bridge continue to bleed despite holding pressure, so lidocaine with epinephrine was injected around the wound to help stop the bleeding. Skin affix was used after the bleeding had improved, to prevent rebleeding.   Patient

## 2019-08-23 NOTE — ED NOTES
Patient discharged from [facility] ED. Wound irrigated and dressed. Discharge instructions given with verbal understanding from the patient. Patient instructed to contact and follow up with PCP or return if condition should change or worsen.       Dru Acuña RN  08/22/19 2013

## 2019-09-16 RX ORDER — HYDROCHLOROTHIAZIDE 12.5 MG/1
12.5 CAPSULE, GELATIN COATED ORAL DAILY
Qty: 90 CAPSULE | Refills: 1 | Status: SHIPPED | OUTPATIENT
Start: 2019-09-16 | End: 2019-09-24 | Stop reason: SDUPTHER

## 2019-09-24 RX ORDER — HYDROCHLOROTHIAZIDE 12.5 MG/1
12.5 CAPSULE, GELATIN COATED ORAL DAILY
Qty: 90 CAPSULE | Refills: 1 | Status: SHIPPED | OUTPATIENT
Start: 2019-09-24 | End: 2020-04-13

## 2019-09-24 RX ORDER — LABETALOL 100 MG/1
100 TABLET, FILM COATED ORAL 2 TIMES DAILY
Qty: 180 TABLET | Refills: 1 | Status: SHIPPED | OUTPATIENT
Start: 2019-09-24 | End: 2020-03-16

## 2019-09-30 RX ORDER — AMLODIPINE BESYLATE 10 MG/1
10 TABLET ORAL DAILY
Qty: 90 TABLET | Refills: 3 | Status: SHIPPED | OUTPATIENT
Start: 2019-09-30 | End: 2019-10-01 | Stop reason: SDUPTHER

## 2019-10-01 RX ORDER — AMLODIPINE BESYLATE 10 MG/1
10 TABLET ORAL DAILY
Qty: 90 TABLET | Refills: 3 | Status: SHIPPED | OUTPATIENT
Start: 2019-10-01 | End: 2020-10-15

## 2019-12-11 ENCOUNTER — CARE COORDINATION (OUTPATIENT)
Dept: CARE COORDINATION | Age: 74
End: 2019-12-11

## 2020-03-16 RX ORDER — LABETALOL 100 MG/1
TABLET, FILM COATED ORAL
Qty: 180 TABLET | Refills: 1 | Status: SHIPPED | OUTPATIENT
Start: 2020-03-16 | End: 2020-08-13

## 2020-04-13 RX ORDER — HYDROCHLOROTHIAZIDE 12.5 MG/1
CAPSULE, GELATIN COATED ORAL
Qty: 90 CAPSULE | Refills: 1 | Status: ON HOLD | OUTPATIENT
Start: 2020-04-13 | End: 2020-09-04 | Stop reason: HOSPADM

## 2020-06-15 ENCOUNTER — OFFICE VISIT (OUTPATIENT)
Dept: FAMILY MEDICINE CLINIC | Age: 75
End: 2020-06-15
Payer: MEDICARE

## 2020-06-15 VITALS
DIASTOLIC BLOOD PRESSURE: 82 MMHG | HEIGHT: 63 IN | BODY MASS INDEX: 37.32 KG/M2 | RESPIRATION RATE: 16 BRPM | SYSTOLIC BLOOD PRESSURE: 138 MMHG | HEART RATE: 68 BPM | TEMPERATURE: 98 F | OXYGEN SATURATION: 99 % | WEIGHT: 210.6 LBS

## 2020-06-15 LAB
ALBUMIN SERPL-MCNC: 4.3 G/DL (ref 3.4–5)
ALP BLD-CCNC: 99 U/L (ref 40–129)
ALT SERPL-CCNC: 12 U/L (ref 10–40)
ANION GAP SERPL CALCULATED.3IONS-SCNC: 14 MMOL/L (ref 3–16)
AST SERPL-CCNC: 16 U/L (ref 15–37)
BILIRUB SERPL-MCNC: 0.5 MG/DL (ref 0–1)
BILIRUBIN DIRECT: <0.2 MG/DL (ref 0–0.3)
BILIRUBIN, INDIRECT: NORMAL MG/DL (ref 0–1)
BUN BLDV-MCNC: 10 MG/DL (ref 7–20)
CALCIUM SERPL-MCNC: 9.6 MG/DL (ref 8.3–10.6)
CHLORIDE BLD-SCNC: 98 MMOL/L (ref 99–110)
CHOLESTEROL, TOTAL: 178 MG/DL (ref 0–199)
CO2: 28 MMOL/L (ref 21–32)
CREAT SERPL-MCNC: 0.8 MG/DL (ref 0.6–1.2)
GFR AFRICAN AMERICAN: >60
GFR NON-AFRICAN AMERICAN: >60
GLUCOSE BLD-MCNC: 96 MG/DL (ref 70–99)
HDLC SERPL-MCNC: 42 MG/DL (ref 40–60)
LDL CHOLESTEROL CALCULATED: 118 MG/DL
POTASSIUM SERPL-SCNC: 3.5 MMOL/L (ref 3.5–5.1)
SODIUM BLD-SCNC: 140 MMOL/L (ref 136–145)
TOTAL PROTEIN: 7.1 G/DL (ref 6.4–8.2)
TRIGL SERPL-MCNC: 90 MG/DL (ref 0–150)
VLDLC SERPL CALC-MCNC: 18 MG/DL

## 2020-06-15 PROCEDURE — G8417 CALC BMI ABV UP PARAM F/U: HCPCS | Performed by: INTERNAL MEDICINE

## 2020-06-15 PROCEDURE — G8399 PT W/DXA RESULTS DOCUMENT: HCPCS | Performed by: INTERNAL MEDICINE

## 2020-06-15 PROCEDURE — G8510 SCR DEP NEG, NO PLAN REQD: HCPCS | Performed by: INTERNAL MEDICINE

## 2020-06-15 PROCEDURE — 3017F COLORECTAL CA SCREEN DOC REV: CPT | Performed by: INTERNAL MEDICINE

## 2020-06-15 PROCEDURE — 1090F PRES/ABSN URINE INCON ASSESS: CPT | Performed by: INTERNAL MEDICINE

## 2020-06-15 PROCEDURE — 1123F ACP DISCUSS/DSCN MKR DOCD: CPT | Performed by: INTERNAL MEDICINE

## 2020-06-15 PROCEDURE — 1036F TOBACCO NON-USER: CPT | Performed by: INTERNAL MEDICINE

## 2020-06-15 PROCEDURE — 4040F PNEUMOC VAC/ADMIN/RCVD: CPT | Performed by: INTERNAL MEDICINE

## 2020-06-15 PROCEDURE — G8427 DOCREV CUR MEDS BY ELIG CLIN: HCPCS | Performed by: INTERNAL MEDICINE

## 2020-06-15 PROCEDURE — 99214 OFFICE O/P EST MOD 30 MIN: CPT | Performed by: INTERNAL MEDICINE

## 2020-06-15 PROCEDURE — 36415 COLL VENOUS BLD VENIPUNCTURE: CPT | Performed by: INTERNAL MEDICINE

## 2020-06-15 PROCEDURE — 3288F FALL RISK ASSESSMENT DOCD: CPT | Performed by: INTERNAL MEDICINE

## 2020-06-15 ASSESSMENT — PATIENT HEALTH QUESTIONNAIRE - PHQ9
SUM OF ALL RESPONSES TO PHQ QUESTIONS 1-9: 0
SUM OF ALL RESPONSES TO PHQ9 QUESTIONS 1 & 2: 0
2. FEELING DOWN, DEPRESSED OR HOPELESS: 0
SUM OF ALL RESPONSES TO PHQ QUESTIONS 1-9: 0
1. LITTLE INTEREST OR PLEASURE IN DOING THINGS: 0

## 2020-06-15 ASSESSMENT — ENCOUNTER SYMPTOMS
RESPIRATORY NEGATIVE: 1
ALLERGIC/IMMUNOLOGIC NEGATIVE: 1
GASTROINTESTINAL NEGATIVE: 1

## 2020-06-15 NOTE — PATIENT INSTRUCTIONS
All above problems reviewed and the found to be unchanged except for the following :     Low Level of High Density Lipoprotein (Hdl). Will do labs. Improve diet and lose some weight. Pulmonary Embolism, Bilateral (Hcc). Call if new c/o. Metabolic Syndrome. Will do labs. Improve diet and exercise as tolerated. Htn, Goal Below 150/90. Continue present meds. Home BP checks. Call if>140/90. Improve diet. Avoid caffeine and salt. Call if new c/o w/ meds. Obesity (BMI 30-39.9). goal is to lose 10 lbs befor next visit in 6 mo.

## 2020-06-15 NOTE — PROGRESS NOTES
tenderness. Musculoskeletal: Normal range of motion. General: No swelling, deformity or signs of injury. Right lower leg: No edema. Left lower leg: No edema. Lymphadenopathy:      Cervical: No cervical adenopathy. Skin:     General: Skin is warm and dry. Capillary Refill: Capillary refill takes less than 2 seconds. Coloration: Skin is not jaundiced or pale. Findings: No abrasion, bruising, erythema, lesion or rash. Nails: There is no clubbing. Neurological:      General: No focal deficit present. Mental Status: She is alert and oriented to person, place, and time. Mental status is at baseline. She is not disoriented. Cranial Nerves: No cranial nerve deficit. Sensory: No sensory deficit. Motor: No weakness, tremor, atrophy or abnormal muscle tone. Coordination: Coordination normal.      Gait: Gait normal.      Deep Tendon Reflexes: Reflexes are normal and symmetric. Psychiatric:         Mood and Affect: Mood normal.         Speech: Speech normal.         Behavior: Behavior normal.         Thought Content: Thought content normal.         Judgment: Judgment normal.         Assessment:      Problem   Low Level of High Density Lipoprotein (Hdl). No real change in diet or wt. Pulmonary Embolism, Bilateral (Hcc). No reoccurrence. Metabolic Syndrome. Stable diet and wt. No real change in gx. Htn, Goal Below 140/90. Good w/ meds. Obesity (BMI 30-39.9). no change in diet or wt. Plan:      All above problems reviewed and the found to be unchanged except for the following :     Low Level of High Density Lipoprotein (Hdl). Will do labs. Improve diet and lose some weight. Pulmonary Embolism, Bilateral (Hcc). Call if new c/o. Metabolic Syndrome. Will do labs. Improve diet and exercise as tolerated. Htn, Goal Below 150/90. Continue present meds. Home BP checks. Call if>140/90. Improve diet. Avoid caffeine and salt.  Call if new c/o

## 2020-08-13 RX ORDER — LABETALOL 100 MG/1
TABLET, FILM COATED ORAL
Qty: 180 TABLET | Refills: 1 | Status: SHIPPED | OUTPATIENT
Start: 2020-08-13 | End: 2021-02-12

## 2020-09-01 ENCOUNTER — APPOINTMENT (OUTPATIENT)
Dept: CT IMAGING | Age: 75
DRG: 175 | End: 2020-09-01
Payer: MEDICARE

## 2020-09-01 ENCOUNTER — APPOINTMENT (OUTPATIENT)
Dept: GENERAL RADIOLOGY | Age: 75
DRG: 175 | End: 2020-09-01
Payer: MEDICARE

## 2020-09-01 ENCOUNTER — HOSPITAL ENCOUNTER (INPATIENT)
Age: 75
LOS: 3 days | Discharge: HOME | DRG: 175 | End: 2020-09-04
Attending: EMERGENCY MEDICINE | Admitting: INTERNAL MEDICINE
Payer: MEDICARE

## 2020-09-01 LAB
A/G RATIO: 1.4 (ref 1.1–2.2)
ALBUMIN SERPL-MCNC: 4.1 G/DL (ref 3.4–5)
ALP BLD-CCNC: 92 U/L (ref 40–129)
ALT SERPL-CCNC: 12 U/L (ref 10–40)
AMORPHOUS: ABNORMAL /HPF
ANION GAP SERPL CALCULATED.3IONS-SCNC: 16 MMOL/L (ref 3–16)
APTT: 29.8 SEC (ref 24.2–36.2)
AST SERPL-CCNC: 18 U/L (ref 15–37)
BACTERIA: ABNORMAL /HPF
BASE EXCESS VENOUS: 2.6 MMOL/L (ref -3–3)
BASOPHILS ABSOLUTE: 0.1 K/UL (ref 0–0.2)
BASOPHILS RELATIVE PERCENT: 1.2 %
BILIRUB SERPL-MCNC: 0.6 MG/DL (ref 0–1)
BILIRUBIN URINE: NEGATIVE
BLOOD, URINE: ABNORMAL
BUN BLDV-MCNC: 23 MG/DL (ref 7–20)
CALCIUM SERPL-MCNC: 9.5 MG/DL (ref 8.3–10.6)
CARBOXYHEMOGLOBIN: 2.3 % (ref 0–1.5)
CHLORIDE BLD-SCNC: 93 MMOL/L (ref 99–110)
CLARITY: CLEAR
CO2: 24 MMOL/L (ref 21–32)
COLOR: YELLOW
CREAT SERPL-MCNC: 1.1 MG/DL (ref 0.6–1.2)
EOSINOPHILS ABSOLUTE: 0.1 K/UL (ref 0–0.6)
EOSINOPHILS RELATIVE PERCENT: 1.1 %
EPITHELIAL CELLS, UA: ABNORMAL /HPF (ref 0–5)
GFR AFRICAN AMERICAN: 59
GFR NON-AFRICAN AMERICAN: 49
GLOBULIN: 2.9 G/DL
GLUCOSE BLD-MCNC: 144 MG/DL (ref 70–99)
GLUCOSE URINE: NEGATIVE MG/DL
HCO3 VENOUS: 25.7 MMOL/L (ref 23–29)
HCT VFR BLD CALC: 43.1 % (ref 36–48)
HEMOGLOBIN: 14.2 G/DL (ref 12–16)
INR BLD: 1.13 (ref 0.86–1.14)
KETONES, URINE: NEGATIVE MG/DL
LEUKOCYTE ESTERASE, URINE: ABNORMAL
LYMPHOCYTES ABSOLUTE: 1.8 K/UL (ref 1–5.1)
LYMPHOCYTES RELATIVE PERCENT: 14.9 %
MCH RBC QN AUTO: 26.1 PG (ref 26–34)
MCHC RBC AUTO-ENTMCNC: 32.8 G/DL (ref 31–36)
MCV RBC AUTO: 79.4 FL (ref 80–100)
METHEMOGLOBIN VENOUS: 0.5 %
MICROSCOPIC EXAMINATION: YES
MONOCYTES ABSOLUTE: 0.9 K/UL (ref 0–1.3)
MONOCYTES RELATIVE PERCENT: 7.3 %
NEUTROPHILS ABSOLUTE: 9.3 K/UL (ref 1.7–7.7)
NEUTROPHILS RELATIVE PERCENT: 75.5 %
NITRITE, URINE: NEGATIVE
O2 CONTENT, VEN: 18 VOL %
O2 SAT, VEN: 86 %
O2 THERAPY: ABNORMAL
PCO2, VEN: 35.4 MMHG (ref 40–50)
PDW BLD-RTO: 14.8 % (ref 12.4–15.4)
PH UA: 6 (ref 5–8)
PH VENOUS: 7.48 (ref 7.35–7.45)
PLATELET # BLD: 295 K/UL (ref 135–450)
PMV BLD AUTO: 7.9 FL (ref 5–10.5)
PO2, VEN: 49.2 MMHG (ref 25–40)
POTASSIUM REFLEX MAGNESIUM: 3.4 MMOL/L (ref 3.5–5.1)
PRO-BNP: 6596 PG/ML (ref 0–124)
PROTEIN UA: NEGATIVE MG/DL
PROTHROMBIN TIME: 13.1 SEC (ref 10–13.2)
RBC # BLD: 5.43 M/UL (ref 4–5.2)
RBC UA: ABNORMAL /HPF (ref 0–4)
RENAL EPITHELIAL, UA: ABNORMAL /HPF (ref 0–1)
SARS-COV-2, NAAT: NOT DETECTED
SODIUM BLD-SCNC: 133 MMOL/L (ref 136–145)
SPECIFIC GRAVITY UA: 1.01 (ref 1–1.03)
TCO2 CALC VENOUS: 27 MMOL/L
TOTAL PROTEIN: 7 G/DL (ref 6.4–8.2)
TROPONIN: 0.03 NG/ML
URINE REFLEX TO CULTURE: ABNORMAL
URINE TYPE: ABNORMAL
UROBILINOGEN, URINE: 0.2 E.U./DL
WBC # BLD: 12.3 K/UL (ref 4–11)
WBC UA: ABNORMAL /HPF (ref 0–5)

## 2020-09-01 PROCEDURE — 82803 BLOOD GASES ANY COMBINATION: CPT

## 2020-09-01 PROCEDURE — 71260 CT THORAX DX C+: CPT

## 2020-09-01 PROCEDURE — 83880 ASSAY OF NATRIURETIC PEPTIDE: CPT

## 2020-09-01 PROCEDURE — 96374 THER/PROPH/DIAG INJ IV PUSH: CPT

## 2020-09-01 PROCEDURE — 81001 URINALYSIS AUTO W/SCOPE: CPT

## 2020-09-01 PROCEDURE — 71045 X-RAY EXAM CHEST 1 VIEW: CPT

## 2020-09-01 PROCEDURE — 6360000002 HC RX W HCPCS: Performed by: EMERGENCY MEDICINE

## 2020-09-01 PROCEDURE — 93005 ELECTROCARDIOGRAM TRACING: CPT | Performed by: PHYSICIAN ASSISTANT

## 2020-09-01 PROCEDURE — 85610 PROTHROMBIN TIME: CPT

## 2020-09-01 PROCEDURE — 6360000004 HC RX CONTRAST MEDICATION: Performed by: PHYSICIAN ASSISTANT

## 2020-09-01 PROCEDURE — 85730 THROMBOPLASTIN TIME PARTIAL: CPT

## 2020-09-01 PROCEDURE — 2580000003 HC RX 258: Performed by: PHYSICIAN ASSISTANT

## 2020-09-01 PROCEDURE — 2500000003 HC RX 250 WO HCPCS: Performed by: EMERGENCY MEDICINE

## 2020-09-01 PROCEDURE — 99285 EMERGENCY DEPT VISIT HI MDM: CPT

## 2020-09-01 PROCEDURE — 84484 ASSAY OF TROPONIN QUANT: CPT

## 2020-09-01 PROCEDURE — 6370000000 HC RX 637 (ALT 250 FOR IP): Performed by: PHYSICIAN ASSISTANT

## 2020-09-01 PROCEDURE — 80053 COMPREHEN METABOLIC PANEL: CPT

## 2020-09-01 PROCEDURE — 36415 COLL VENOUS BLD VENIPUNCTURE: CPT

## 2020-09-01 PROCEDURE — 94640 AIRWAY INHALATION TREATMENT: CPT

## 2020-09-01 PROCEDURE — U0002 COVID-19 LAB TEST NON-CDC: HCPCS

## 2020-09-01 PROCEDURE — 85025 COMPLETE CBC W/AUTO DIFF WBC: CPT

## 2020-09-01 PROCEDURE — 99223 1ST HOSP IP/OBS HIGH 75: CPT | Performed by: INTERNAL MEDICINE

## 2020-09-01 PROCEDURE — 2000000000 HC ICU R&B

## 2020-09-01 PROCEDURE — 2580000003 HC RX 258: Performed by: INTERNAL MEDICINE

## 2020-09-01 RX ORDER — IPRATROPIUM BROMIDE AND ALBUTEROL SULFATE 2.5; .5 MG/3ML; MG/3ML
1 SOLUTION RESPIRATORY (INHALATION) ONCE
Status: COMPLETED | OUTPATIENT
Start: 2020-09-01 | End: 2020-09-01

## 2020-09-01 RX ORDER — HEPARIN SODIUM 1000 [USP'U]/ML
2800 INJECTION, SOLUTION INTRAVENOUS; SUBCUTANEOUS PRN
Status: DISCONTINUED | OUTPATIENT
Start: 2020-09-01 | End: 2020-09-02

## 2020-09-01 RX ORDER — ONDANSETRON 2 MG/ML
4 INJECTION INTRAMUSCULAR; INTRAVENOUS EVERY 6 HOURS PRN
Status: DISCONTINUED | OUTPATIENT
Start: 2020-09-01 | End: 2020-09-03

## 2020-09-01 RX ORDER — HEPARIN SODIUM 10000 [USP'U]/100ML
12.7 INJECTION, SOLUTION INTRAVENOUS CONTINUOUS
Status: DISCONTINUED | OUTPATIENT
Start: 2020-09-01 | End: 2020-09-02

## 2020-09-01 RX ORDER — ACETAMINOPHEN 325 MG/1
650 TABLET ORAL EVERY 6 HOURS PRN
Status: DISCONTINUED | OUTPATIENT
Start: 2020-09-01 | End: 2020-09-03

## 2020-09-01 RX ORDER — HEPARIN SODIUM 10000 [USP'U]/100ML
12.7 INJECTION, SOLUTION INTRAVENOUS CONTINUOUS
Status: DISCONTINUED | OUTPATIENT
Start: 2020-09-01 | End: 2020-09-01

## 2020-09-01 RX ORDER — SODIUM CHLORIDE 0.9 % (FLUSH) 0.9 %
10 SYRINGE (ML) INJECTION EVERY 12 HOURS SCHEDULED
Status: DISCONTINUED | OUTPATIENT
Start: 2020-09-01 | End: 2020-09-03

## 2020-09-01 RX ORDER — SODIUM CHLORIDE 0.9 % (FLUSH) 0.9 %
10 SYRINGE (ML) INJECTION PRN
Status: DISCONTINUED | OUTPATIENT
Start: 2020-09-01 | End: 2020-09-03

## 2020-09-01 RX ORDER — POTASSIUM CHLORIDE 20 MEQ/1
20 TABLET, EXTENDED RELEASE ORAL ONCE
Status: DISCONTINUED | OUTPATIENT
Start: 2020-09-02 | End: 2020-09-04 | Stop reason: HOSPADM

## 2020-09-01 RX ORDER — ASPIRIN 81 MG/1
81 TABLET ORAL DAILY
Status: DISCONTINUED | OUTPATIENT
Start: 2020-09-02 | End: 2020-09-04 | Stop reason: HOSPADM

## 2020-09-01 RX ORDER — HEPARIN SODIUM 1000 [USP'U]/ML
5600 INJECTION, SOLUTION INTRAVENOUS; SUBCUTANEOUS PRN
Status: DISCONTINUED | OUTPATIENT
Start: 2020-09-01 | End: 2020-09-02

## 2020-09-01 RX ORDER — POLYETHYLENE GLYCOL 3350 17 G/17G
17 POWDER, FOR SOLUTION ORAL DAILY PRN
Status: DISCONTINUED | OUTPATIENT
Start: 2020-09-01 | End: 2020-09-04 | Stop reason: HOSPADM

## 2020-09-01 RX ORDER — 0.9 % SODIUM CHLORIDE 0.9 %
500 INTRAVENOUS SOLUTION INTRAVENOUS ONCE
Status: COMPLETED | OUTPATIENT
Start: 2020-09-01 | End: 2020-09-01

## 2020-09-01 RX ORDER — PROMETHAZINE HYDROCHLORIDE 25 MG/1
12.5 TABLET ORAL EVERY 6 HOURS PRN
Status: DISCONTINUED | OUTPATIENT
Start: 2020-09-01 | End: 2020-09-04 | Stop reason: HOSPADM

## 2020-09-01 RX ORDER — ACETAMINOPHEN 650 MG/1
650 SUPPOSITORY RECTAL EVERY 6 HOURS PRN
Status: DISCONTINUED | OUTPATIENT
Start: 2020-09-01 | End: 2020-09-03

## 2020-09-01 RX ORDER — HEPARIN SODIUM 1000 [USP'U]/ML
5600 INJECTION, SOLUTION INTRAVENOUS; SUBCUTANEOUS ONCE
Status: COMPLETED | OUTPATIENT
Start: 2020-09-01 | End: 2020-09-01

## 2020-09-01 RX ADMIN — HEPARIN SODIUM 5600 UNITS: 1000 INJECTION INTRAVENOUS; SUBCUTANEOUS at 17:06

## 2020-09-01 RX ADMIN — SODIUM CHLORIDE 500 ML: 9 INJECTION, SOLUTION INTRAVENOUS at 16:28

## 2020-09-01 RX ADMIN — HEPARIN SODIUM 12.7 ML/HR: 10000 INJECTION, SOLUTION INTRAVENOUS at 17:06

## 2020-09-01 RX ADMIN — IPRATROPIUM BROMIDE AND ALBUTEROL SULFATE 1 AMPULE: .5; 3 SOLUTION RESPIRATORY (INHALATION) at 16:21

## 2020-09-01 RX ADMIN — Medication 10 ML: at 23:44

## 2020-09-01 RX ADMIN — IOPAMIDOL 75 ML: 755 INJECTION, SOLUTION INTRAVENOUS at 16:13

## 2020-09-01 ASSESSMENT — PAIN SCALES - GENERAL
PAINLEVEL_OUTOF10: 0
PAINLEVEL_OUTOF10: 0

## 2020-09-01 NOTE — ED NOTES
Patient identified as a positive fall risk on the ED triage fall screening. Patient placed in fall precautions which includes:  yellow fall risk bracelet on wrist, gym shoes remain on feet, \"Be Safe\" sign placed on patient's door, and bed alarm placed under patient/alarm turned on. Patient instructed on importance of not getting out of bed or ambulating without assistance for safety.           Lorri Flores RN  09/01/20 7000

## 2020-09-01 NOTE — H&P
Hospital Medicine History & Physical      PCP: Maximilian Coughlin MD    Date of Admission: 9/1/2020    Date of Service: Pt seen/examined on 9/1/20 and Admitted to Inpatient with expected LOS greater than two midnights due to medical therapy. Chief Complaint:  SOB      History Of Present Illness:    70 y.o. female who presented to [facility] with sob. Pt noted sudden onset on Saturday. No travel but does note falling once approx 1.5 weeks ago(she lives at independent living facility) and bruising her right knee. She has NOT been bedbound since. This weekend, she noted progressively worsening RILEY. No cp/LOC. No f/chills, no cough, no n/v/diarrhea. She notes prior DVT(unclear etiology) around 2015(came to [address] [place] [address]) and was tx'd for approx 4 months. She denies hx of cancer and is she states that she is uptodate on pap/mamogram/cscope. Past Medical History:          Diagnosis Date    Hx of blood clots 2015    Hypertension        Past Surgical History:          Procedure Laterality Date    COLONOSCOPY  2006    COLONOSCOPY  4/19/16    colon polyp    EYE SURGERY      cataracts bilateral    HYSTERECTOMY         Medications Prior to Admission:      Prior to Admission medications    Medication Sig Start Date End Date Taking? Authorizing Provider   labetalol (NORMODYNE) 100 MG tablet TAKE 1 TABLET TWICE DAILY 8/13/20  Yes C Sammie Simmonds, MD   hydrochlorothiazide (MICROZIDE) 12.5 MG capsule TAKE 1 CAPSULE EVERY DAY 4/13/20  Yes C Sammie Simmonds, MD   amLODIPine (NORVASC) 10 MG tablet Take 1 tablet by mouth daily 10/1/19  Yes Maximilian Coughlin MD   aspirin 81 MG tablet Take 1 tablet by mouth daily 8/28/17  Yes Maximilian Coughlin MD       Allergies:  Patient has no known allergies. Social History:      The patient currently lives at home    TOBACCO:   reports that she has never smoked. She has never used smokeless tobacco.  ETOH:   reports current alcohol use.   E-Cigarettes Vaping or Juuling     Questions Responses

## 2020-09-01 NOTE — ED NOTES
Bed: 20  Expected date:   Expected time:   Means of arrival:   Comments:  [facility] [address] Werner, ALVIN  09/01/20 0372

## 2020-09-01 NOTE — CONSULTS
PULMONARY AND CRITICAL CARE INPATIENT CONSULTATION      9/1/2020    Patient Name:  Van Swain       1945       Evaluation was requested by Dr. Ran Whitlock regarding shortness of breath, bilateral PE.    HPI: Piter Wilkinson is a very pleasant 70 y.o. female who presented to the ER today with shortness of breath. The patient has minimal medical problems, is on treatment for hypertension, had a prior history of DVT in the right popliteal vein in April 2015, it had resolved on follow-up study on 11/11/2015. She was on anticoagulation, was treated for several months. She does not remember details. She does not remember details about pulmonary embolism, but had a high probability VQ scan on t 4/6/2015. It appears this episode of thromboembolism was unprovoked. She had mild elevation of BUN and creatinine, felt to be possibly due to diuretic and ACE inhibitor combination. The patient has been a lifelong non-smoker, rarely drinks alcohol. She is a  and has no children. 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 She lives in an independent living facility, had a fall few days ago and developed an injury to her right knee. She claims she was not in active, continue to walk. On Saturday she noticed shortness of breath, not necessarily related to exertion. She is unable to give any accurate history about her shortness of breath, denies any chest pain or syncopal episodes. She denied any leg swelling. Symptoms continued through the weekend, today she was trying to take someone else for shopping, noted significant shortness of breath, possibly worsening over the last 2 days. She therefore presented to the ER. The patient has no history of cancer, has undergone colonoscopy in the past.  She denies weight loss. She denies symptoms of sleep apnea. She denies weight loss. The rest of her review of systems was unremarkable.     The patient was evaluated in the ER, underwent CT chest with PE protocol, lab work. This showed extensive clot burden in the main pulmonary arteries bilaterally, elevated BNP and troponin. Our service was therefore consulted by Dr. Joyce Ford. 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) 04/12/2018    History of DVT of lower extremity 04/07/2015    Pulmonary embolism, bilateral ([facility] 04/07/2015    Hypokalemia 04/06/2015    ARF (acute renal failure) ([facility] 48/72/2737    Metabolic syndrome 73/31/3854    HTN, goal below 150/90 12/09/2014    Obesity (BMI 30-39.9) 12/09/2014       Past Medical History:   Diagnosis Date    Hx of blood clots 2015    Hypertension         Past Surgical History:   Procedure Laterality Date    COLONOSCOPY  2006    COLONOSCOPY  4/19/16    colon polyp    EYE SURGERY      cataracts bilateral    HYSTERECTOMY          Family History   Problem Relation Age of Onset    Cancer Mother     Breast Cancer Mother     Coronary Art Dis Father         Social History     Tobacco Use    Smoking status: Never Smoker    Smokeless tobacco: Never Used   Substance Use Topics    Alcohol use: Yes     Alcohol/week: 0.0 standard drinks     Comment: rarely        No Known Allergies      Vital Signs:  Blood pressure (!) 154/80, pulse 100, temperature 97.2 °F (36.2 °C), temperature source Oral, resp. rate 22, height 5' 4\" (1.626 m), weight 209 lb (94.8 kg), SpO2 93 %.' on RA  Body mass index is 35.87 kg/m². CVP:    No intake or output data in the 24 hours ending 09/01/20 1929      PHYSICAL EXAM:  General: Very pleasant, obese. Well nourished, well developed, in no respiratory distress. Oxygen saturation in the low 90s. Borderline tachycardia. Eyes:  No scleral icterus or periorbital edema. Head: Atraumatic, normocephalic.   ENMT: Class III airway, missing teeth. No bleeding of lips or gums. Mucosa pink and moist. No ulceration. No oral candidiasis. Neck: Short and large neck, no palpable goiter, no lymphadenopathy, no JVD. No tracheal deviation. No S/Q emphysema. No stiff neck. Lymphadenopathy: No cervical, supraclavicular adenopathy. CV: S1, S2, no murmurs, gallops, clicks or rubs. Pulses palpable and symmetrical, strong. Capillary refills in nail beds are brisk. Respiratory: Clear to auscultation  Chest: Equal rise and fall of chest.   GI: Abdomen soft, fatty abdominal wall. No organomegaly. Bowel sounds present. : Deferred. Skin: No rashes, lesions, bruises, induration, discharge. Color, texture, turgor normal.  Musculoskeletal: Supple, no contractures or muscle atrophy. No clubbing or cyanosis of nailbeds. No joint swelling, redness. Edema:  Neuro: Detailed exam not performed, moves all extremities. Lines: Peripheral    Results:  CBC:   Recent Labs     09/01/20  1511   WBC 12.3*   HGB 14.2   HCT 43.1   MCV 79.4*        BMP:   Recent Labs     09/01/20  1511   *   K 3.4*   CL 93*   CO2 24   BUN 23*   CREATININE 1.1       Imaging:  I have reviewed radiology images personally. Xr Chest Portable    Result Date: 9/1/2020  Mild cardiomegaly and borderline vascular congestion. Ct Chest Pulmonary Embolism W Contrast    Result Date: 9/1/2020  Multiple large acute bilateral pulmonary emboli. Findings were discussed with Nu Frederick at 4:27 pm on 9/1/2020. ASSESSMENT AND PLAN:  Bilateral submassive pulmonary embolism. Although the patient is hemodynamically stable, she has elevated troponin, BNP and significant clot burden in both the main pulmonary arteries and through multiple other vessels. I believe she would be a good candidate for catheter directed thrombolysis utilizing the EKOS system.   She did have RV dysfunction during her prior thromboembolism episode there was no further work-up, but she has remained asymptomatic for the last 5 years. The procedure was discussed with the patient, her sister-in-law and her brother. However I did inform them that the procedure would be decided upon by vascular surgery. I would recommend obtaining an echocardiogram and lower extremity venous Doppler studies. Although there is low risk for COVID infection, due to the increased risk of thromboembolism in this disease, I have recommended a rapid test.  Elevated troponin, elevated proBNP. From the massive pulmonary embolism. Essential hypertension. Discussed with Dr. Sumit Jensen, will hold antihypertensives until her procedure is completed. Hyponatremia. Mild. Follow profile. Elevated glucose. Follow profile. Probably a stress response. Leukocytosis. Mild. Follow profile. Obesity. Should make attempts to lose weight. DVT prophylaxis. Presently on a heparin drip. Echocardiogram 4/6/2015:  Normal left ventricle size, wall thickness and systolic function with an  estimated ejection fraction of 55-60%. Diastolic filling parameters suggests grade I diastolic dysfunction. Mitral annular calcification is present. Mild mitral regurgitation is present. Aortic valve appears sclerotic but opens adequately. There is no significant aortic valve regurgitation. The right ventricle is enlarged. TAPSE is measured at 12mm and is abnormal, consistent with impaired systolic function. PFT: None in EMR    I have examined this patient and available medical records, including all the radiographic and all relevant lab data/studies personally on this date and have made the above observations, conclusions and recommendations. I have discussed with nursing staff, patient, her family: Brother and sister-in-law. Discussed with Dr. Sumit Jensen and Dr. Sue Mena. Thank you for the consultation. We will continue to follow with you.     Electronically signed by:  Roque Jett MD    9/1/2020    7:29 PM.

## 2020-09-01 NOTE — ED NOTES
Fall risk screening completed.  Fall risk bracelet applied to patient.  Non-skid socks provided and placed on patient. [facility] [employer] fall risk is indicated using  dome light .  Based on score, a bed alarm was indicated and applied.  The call light is within the patient's reach, and instructions for use were provided.  The bed is in the lowest position with wheels locked.  The patient has been advised to notify staff, using the call light, if there is a need to get up or use restroom.  The patient verbalized understanding of safety precautions and how to contact staff for assistance.          Savanah Hall RN  09/01/20 7946

## 2020-09-01 NOTE — ED NOTES
Called vascular surgery @ 1818   Re: submassive PE, saddle, EKOS candidate  Md @ [facility]  09/01/20 5454

## 2020-09-01 NOTE — ED PROVIDER NOTES
MEDICAL HISTORY     Past Medical History:   Diagnosis Date    Hx of blood clots 2015    Hypertension          SURGICAL HISTORY     Past Surgical History:   Procedure Laterality Date    COLONOSCOPY  2006    COLONOSCOPY  4/19/16    colon polyp    EYE SURGERY      cataracts bilateral    HYSTERECTOMY           CURRENTMEDICATIONS       Current Discharge Medication List      CONTINUE these medications which have NOT CHANGED    Details   labetalol (NORMODYNE) 100 MG tablet TAKE 1 TABLET TWICE DAILY  Qty: 180 tablet, Refills: 1      hydrochlorothiazide (MICROZIDE) 12.5 MG capsule TAKE 1 CAPSULE EVERY DAY  Qty: 90 capsule, Refills: 1      amLODIPine (NORVASC) 10 MG tablet Take 1 tablet by mouth daily  Qty: 90 tablet, Refills: 3      aspirin 81 MG tablet Take 1 tablet by mouth daily  Qty: 90 tablet, Refills: 3               ALLERGIES     Patient has no known allergies. FAMILYHISTORY       Family History   Problem Relation Age of Onset    Cancer Mother     Breast Cancer Mother     Coronary Art Dis Father           SOCIAL HISTORY       Social History     Tobacco Use    Smoking status: Never Smoker    Smokeless tobacco: Never Used   Substance Use Topics    Alcohol use: Yes     Alcohol/week: 0.0 standard drinks     Comment: rarely    Drug use: No       SCREENINGS    Beebe Coma Scale  Eye Opening: Spontaneous  Best Verbal Response: Oriented  Best Motor Response: Obeys commands  Ambrocio Coma Scale Score: 15        PHYSICAL EXAM    (up to 7 for level 4, 8 or more for level 5)     ED Triage Vitals   BP Temp Temp Source Pulse Resp SpO2 Height Weight   09/01/20 1438 09/01/20 1438 09/01/20 1438 09/01/20 1438 09/01/20 1438 09/01/20 1438 09/01/20 1436 09/01/20 1436   (!) 148/90 97.2 °F (36.2 °C) Oral 98 19 92 % 5' 4\" (1.626 m) 209 lb (94.8 kg)       Physical Exam  Vitals signs and nursing note reviewed. Constitutional:       Appearance: Normal appearance. She is well-developed. She is obese.    HENT:      Head: Normocephalic and atraumatic. Right Ear: External ear normal.      Left Ear: External ear normal.   Eyes:      General: No scleral icterus. Right eye: No discharge. Left eye: No discharge. Conjunctiva/sclera: Conjunctivae normal.   Neck:      Musculoskeletal: Normal range of motion and neck supple. Cardiovascular:      Rate and Rhythm: Normal rate and regular rhythm. Heart sounds: Normal heart sounds. Pulmonary:      Effort: Pulmonary effort is normal.      Breath sounds: Normal breath sounds. Chest:      Chest wall: No tenderness. Abdominal:      General: Abdomen is flat. Bowel sounds are normal.      Palpations: Abdomen is soft. Musculoskeletal: Normal range of motion. Right lower leg: No edema. Left lower leg: No edema. Skin:     General: Skin is warm and dry. Neurological:      General: No focal deficit present. Mental Status: She is alert and oriented to person, place, and time. Mental status is at baseline. Psychiatric:         Mood and Affect: Mood normal.         Behavior: Behavior normal.         Thought Content:  Thought content normal.         Judgment: Judgment normal.         DIAGNOSTIC RESULTS   LABS:    Labs Reviewed   CBC WITH AUTO DIFFERENTIAL - Abnormal; Notable for the following components:       Result Value    WBC 12.3 (*)     RBC 5.43 (*)     MCV 79.4 (*)     Neutrophils Absolute 9.3 (*)     All other components within normal limits    Narrative:     Performed at:  [address]   Phone [phone]   COMPREHENSIVE METABOLIC PANEL W/ REFLEX TO MG FOR LOW K - Abnormal; Notable for the following components:    Sodium 133 (*)     Potassium reflex Magnesium 3.4 (*)     Chloride 93 (*)     Glucose 144 (*)     BUN 23 (*)     GFR Non- 49 (*)     GFR  59 (*)     All other components within normal limits    Narrative:     Performed at:  [facility] [address], [facility] [employer]   Phone [phone]   TROPONIN - Abnormal; Notable for the following components:    Troponin 0.03 (*)     All other components within normal limits    Narrative:     Performed at:  [address], [facility] [employer]   Phone [phone] - Abnormal; Notable for the following components:    Pro-BNP 6,596 (*)     All other components within normal limits    Narrative:     Performed at:  [address], [facility] [employer]   Phone [phone]   URINE RT REFLEX TO CULTURE - Abnormal; Notable for the following components:    Blood, Urine TRACE-INTACT (*)     Leukocyte Esterase, Urine SMALL (*)     All other components within normal limits    Narrative:     Performed at:  [address], [facility] [employer]   Phone [phone]   BLOOD GAS, VENOUS - Abnormal; Notable for the following components:    pH, Rajinder 7.479 (*)     pCO2, Rajinder 35.4 (*)     pO2, Rajinder 49.2 (*)     Carboxyhemoglobin 2.3 (*)     All other components within normal limits    Narrative:     Performed at:  [address], [facility] [employer]   Phone [phone]   MICROSCOPIC URINALYSIS - Abnormal; Notable for the following components:    RBC, UA 11-20 (*)     Epithelial Cells, UA 6-10 (*)     Renal Epithelial, UA 2-5 (*)     Bacteria, UA 1+ (*)     All other components within normal limits    Narrative:     Performed at:  [place] [address] [employer]   Phone [phone]   APTT - Abnormal; Notable for the following components:    aPTT 102.0 (*)     All other components within normal limits    Narrative:     Performed at:  [address] [address]   Phone  TO MG FOR LOW K - Abnormal; Notable for the following components:    Sodium 135 (*)     Potassium reflex Magnesium 3.1 (*)     Chloride 97 (*)     Glucose 117 (*)     All other components within normal limits    Narrative:     Performed at:  [address], [facility] [place]   Phone [phone]   CBC WITH AUTO DIFFERENTIAL - Abnormal; Notable for the following components:    RBC 5.36 (*)     MCV 78.8 (*)     Anisocytosis Occasional (*)     Microcytes 1+ (*)     Polychromasia Occasional (*)     All other components within normal limits    Narrative:     Performed at:  [place] [address] [place]   Phone [phone]   TROPONIN - Abnormal; Notable for the following components:    Troponin 0.02 (*)     All other components within normal limits    Narrative:     Performed at:  [address], [facility] [place]   Phone [phone]    Narrative:     Performed at:  [place] [address] [place]   Phone [phone]   APTT    Narrative:     Performed at:  [place] [address] [place]   Phone [phone]    Narrative:     Performed at:  [place] [address] [place]   Phone [phone]   MAGNESIUM    Narrative:     Performed at:  [place] [address] [place]   Phone [phone]   MAGNESIUM   TROPONIN   APTT       All other labs were within normal range or not returned as of this dictation. EKG:  All EKG's are interpreted by the Emergency Department Physician in the absence of a cardiologist.  Please see their note for interpretation of administration in time range)   sodium chloride flush 0.9 % injection 10 mL (10 mLs Intravenous Given 9/1/20 2344)   sodium chloride flush 0.9 % injection 10 mL (has no administration in time range)   acetaminophen (TYLENOL) tablet 650 mg (has no administration in time range)     Or   acetaminophen (TYLENOL) suppository 650 mg (has no administration in time range)   polyethylene glycol (GLYCOLAX) packet 17 g (has no administration in time range)   promethazine (PHENERGAN) tablet 12.5 mg (has no administration in time range)     Or   ondansetron (ZOFRAN) injection 4 mg (has no administration in time range)   potassium chloride (KLOR-CON M) extended release tablet 20 mEq (has no administration in time range)   heparin 25,000 unit in sodium chloride 0.45% 250 mL infusion (9.9 mL/hr Intravenous Rate/Dose Change 9/2/20 0202)   ipratropium-albuterol (DUONEB) nebulizer solution 1 ampule (1 ampule Inhalation Given 9/1/20 1621)   0.9 % sodium chloride bolus (0 mLs Intravenous Stopped 9/1/20 1725)   iopamidol (ISOVUE-370) 76 % injection 75 mL (75 mLs Intravenous Given 9/1/20 1613)   heparin (porcine) injection 5,600 Units (5,600 Units Intravenous Given 9/1/20 1706)         At 3:8 PM I reevaluated patient. She continues to mild shortness of breath. Chest x-ray shows cardiomegaly and suggestion of increased pulmonary vascularization. No overt failure noted. proBNP elevated over 6596. Troponin slightly elevated at 0.03. She has not yet had breathing treatment. She has not had CTA to evaluate PE. This could be early failure versus cardiac strain secondary to PE. CTA pulmonary study reveals moderate saddle embolus with multiple pulmonary emboli. Patient without evident heart strain however troponin slightly elevated 23 and proBNP elevated at 6596. Patient requires admission. Review case with attending physician who personally evaluates the patient. FINAL IMPRESSION      1.  Acute saddle pulmonary embolism without acute cor pulmonale (HCC)    2. Multiple pulmonary emboli (HCC)    3. Cardiomegaly    4. Shortness of breath          DISPOSITION/PLAN   DISPOSITION        PATIENT REFERREDTO:  Daniel Atkinson MD  [address] [place] [address] [address]  [place] [id number]  [address]  [phone]            DISCHARGE MEDICATIONS:  Current Discharge Medication List          DISCONTINUED MEDICATIONS:  Current Discharge Medication List                 (Please note that portions of this note were completed with a voice recognition program.  Efforts were made to edit the dictations but occasionally words are mis-transcribed. )    Alisa Phalen, PA-C (electronically signed)           Alisa Phalen, PA-C  09/02/20 0111

## 2020-09-01 NOTE — ED NOTES
Ps hosp @ 1657  Re:submassive PE, saddle on heparin   Per: Dr. Chelita Cope @ bedside [address]  09/01/20 6317

## 2020-09-02 LAB
ANION GAP SERPL CALCULATED.3IONS-SCNC: 14 MMOL/L (ref 3–16)
ANISOCYTOSIS: ABNORMAL
APTT: 102 SEC (ref 24.2–36.2)
APTT: 36 SEC (ref 24.2–36.2)
APTT: 42.6 SEC (ref 24.2–36.2)
APTT: 66.1 SEC (ref 24.2–36.2)
BANDED NEUTROPHILS RELATIVE PERCENT: 3 % (ref 0–7)
BASOPHILS ABSOLUTE: 0 K/UL (ref 0–0.2)
BASOPHILS RELATIVE PERCENT: 0 %
BUN BLDV-MCNC: 15 MG/DL (ref 7–20)
CALCIUM SERPL-MCNC: 9.1 MG/DL (ref 8.3–10.6)
CHLORIDE BLD-SCNC: 97 MMOL/L (ref 99–110)
CO2: 24 MMOL/L (ref 21–32)
CREAT SERPL-MCNC: 0.8 MG/DL (ref 0.6–1.2)
EKG ATRIAL RATE: 96 BPM
EKG DIAGNOSIS: NORMAL
EKG P AXIS: 35 DEGREES
EKG P-R INTERVAL: 146 MS
EKG Q-T INTERVAL: 364 MS
EKG QRS DURATION: 92 MS
EKG QTC CALCULATION (BAZETT): 459 MS
EKG R AXIS: 36 DEGREES
EKG T AXIS: 77 DEGREES
EKG VENTRICULAR RATE: 96 BPM
EOSINOPHILS ABSOLUTE: 0.2 K/UL (ref 0–0.6)
EOSINOPHILS RELATIVE PERCENT: 2 %
FIBRINOGEN: 254 MG/DL (ref 200–397)
FIBRINOGEN: 492 MG/DL (ref 200–397)
GFR AFRICAN AMERICAN: >60
GFR NON-AFRICAN AMERICAN: >60
GLUCOSE BLD-MCNC: 117 MG/DL (ref 70–99)
HCT VFR BLD CALC: 42.2 % (ref 36–48)
HCT VFR BLD CALC: 42.9 % (ref 36–48)
HCT VFR BLD CALC: 44.2 % (ref 36–48)
HEMOGLOBIN: 14 G/DL (ref 12–16)
HEMOGLOBIN: 14.2 G/DL (ref 12–16)
HEMOGLOBIN: 14.8 G/DL (ref 12–16)
LV EF: 65 %
LVEF MODALITY: NORMAL
LYMPHOCYTES ABSOLUTE: 2.4 K/UL (ref 1–5.1)
LYMPHOCYTES RELATIVE PERCENT: 22 %
MAGNESIUM: 2.3 MG/DL (ref 1.8–2.4)
MCH RBC QN AUTO: 26.1 PG (ref 26–34)
MCH RBC QN AUTO: 26.5 PG (ref 26–34)
MCH RBC QN AUTO: 27.1 PG (ref 26–34)
MCHC RBC AUTO-ENTMCNC: 32.6 G/DL (ref 31–36)
MCHC RBC AUTO-ENTMCNC: 33.4 G/DL (ref 31–36)
MCHC RBC AUTO-ENTMCNC: 33.6 G/DL (ref 31–36)
MCV RBC AUTO: 78.8 FL (ref 80–100)
MCV RBC AUTO: 80.2 FL (ref 80–100)
MCV RBC AUTO: 81 FL (ref 80–100)
MICROCYTES: ABNORMAL
MONOCYTES ABSOLUTE: 0.8 K/UL (ref 0–1.3)
MONOCYTES RELATIVE PERCENT: 7 %
NEUTROPHILS ABSOLUTE: 7.5 K/UL (ref 1.7–7.7)
NEUTROPHILS RELATIVE PERCENT: 66 %
PDW BLD-RTO: 15.3 % (ref 12.4–15.4)
PDW BLD-RTO: 15.4 % (ref 12.4–15.4)
PDW BLD-RTO: 15.6 % (ref 12.4–15.4)
PLATELET # BLD: 232 K/UL (ref 135–450)
PLATELET # BLD: 252 K/UL (ref 135–450)
PLATELET # BLD: 289 K/UL (ref 135–450)
PMV BLD AUTO: 7.7 FL (ref 5–10.5)
PMV BLD AUTO: 7.9 FL (ref 5–10.5)
PMV BLD AUTO: 8 FL (ref 5–10.5)
POLYCHROMASIA: ABNORMAL
POTASSIUM REFLEX MAGNESIUM: 3.1 MMOL/L (ref 3.5–5.1)
POTASSIUM SERPL-SCNC: 3.6 MMOL/L (ref 3.5–5.1)
RBC # BLD: 5.34 M/UL (ref 4–5.2)
RBC # BLD: 5.36 M/UL (ref 4–5.2)
RBC # BLD: 5.45 M/UL (ref 4–5.2)
SODIUM BLD-SCNC: 135 MMOL/L (ref 136–145)
TROPONIN: 0.02 NG/ML
TROPONIN: 0.02 NG/ML
WBC # BLD: 10.3 K/UL (ref 4–11)
WBC # BLD: 10.8 K/UL (ref 4–11)
WBC # BLD: 12.7 K/UL (ref 4–11)

## 2020-09-02 PROCEDURE — C8929 TTE W OR WO FOL WCON,DOPPLER: HCPCS

## 2020-09-02 PROCEDURE — 85384 FIBRINOGEN ACTIVITY: CPT

## 2020-09-02 PROCEDURE — C1769 GUIDE WIRE: HCPCS

## 2020-09-02 PROCEDURE — 2000000000 HC ICU R&B

## 2020-09-02 PROCEDURE — 36415 COLL VENOUS BLD VENIPUNCTURE: CPT

## 2020-09-02 PROCEDURE — 83735 ASSAY OF MAGNESIUM: CPT

## 2020-09-02 PROCEDURE — 99233 SBSQ HOSP IP/OBS HIGH 50: CPT | Performed by: INTERNAL MEDICINE

## 2020-09-02 PROCEDURE — 2580000003 HC RX 258

## 2020-09-02 PROCEDURE — 84132 ASSAY OF SERUM POTASSIUM: CPT

## 2020-09-02 PROCEDURE — 6370000000 HC RX 637 (ALT 250 FOR IP): Performed by: INTERNAL MEDICINE

## 2020-09-02 PROCEDURE — 6360000002 HC RX W HCPCS: Performed by: SURGERY

## 2020-09-02 PROCEDURE — 37211 THROMBOLYTIC ART THERAPY: CPT

## 2020-09-02 PROCEDURE — 85025 COMPLETE CBC W/AUTO DIFF WBC: CPT

## 2020-09-02 PROCEDURE — 6360000004 HC RX CONTRAST MEDICATION: Performed by: INTERNAL MEDICINE

## 2020-09-02 PROCEDURE — 94761 N-INVAS EAR/PLS OXIMETRY MLT: CPT

## 2020-09-02 PROCEDURE — 37211 THROMBOLYTIC ART THERAPY: CPT | Performed by: SURGERY

## 2020-09-02 PROCEDURE — 99152 MOD SED SAME PHYS/QHP 5/>YRS: CPT | Performed by: SURGERY

## 2020-09-02 PROCEDURE — 2580000003 HC RX 258: Performed by: SURGERY

## 2020-09-02 PROCEDURE — 84484 ASSAY OF TROPONIN QUANT: CPT

## 2020-09-02 PROCEDURE — 85027 COMPLETE CBC AUTOMATED: CPT

## 2020-09-02 PROCEDURE — C1894 INTRO/SHEATH, NON-LASER: HCPCS

## 2020-09-02 PROCEDURE — 2500000003 HC RX 250 WO HCPCS: Performed by: SURGERY

## 2020-09-02 PROCEDURE — 99231 SBSQ HOSP IP/OBS SF/LOW 25: CPT | Performed by: SURGERY

## 2020-09-02 PROCEDURE — C1751 CATH, INF, PER/CENT/MIDLINE: HCPCS

## 2020-09-02 PROCEDURE — 93010 ELECTROCARDIOGRAM REPORT: CPT | Performed by: INTERNAL MEDICINE

## 2020-09-02 PROCEDURE — 2500000003 HC RX 250 WO HCPCS: Performed by: INTERNAL MEDICINE

## 2020-09-02 PROCEDURE — 3E06317 INTRODUCTION OF OTHER THROMBOLYTIC INTO CENTRAL ARTERY, PERCUTANEOUS APPROACH: ICD-10-PCS | Performed by: SURGERY

## 2020-09-02 PROCEDURE — 2580000003 HC RX 258: Performed by: INTERNAL MEDICINE

## 2020-09-02 PROCEDURE — C1887 CATHETER, GUIDING: HCPCS

## 2020-09-02 PROCEDURE — 36014 PLACE CATHETER IN ARTERY: CPT

## 2020-09-02 PROCEDURE — 2700000000 HC OXYGEN THERAPY PER DAY

## 2020-09-02 PROCEDURE — 2709999900 HC NON-CHARGEABLE SUPPLY

## 2020-09-02 PROCEDURE — 6360000002 HC RX W HCPCS: Performed by: INTERNAL MEDICINE

## 2020-09-02 PROCEDURE — 80048 BASIC METABOLIC PNL TOTAL CA: CPT

## 2020-09-02 PROCEDURE — 36014 PLACE CATHETER IN ARTERY: CPT | Performed by: SURGERY

## 2020-09-02 PROCEDURE — 6A751Z7 ULTRASOUND THERAPY OF OTHER VESSELS, MULTIPLE: ICD-10-PCS | Performed by: SURGERY

## 2020-09-02 PROCEDURE — 76937 US GUIDE VASCULAR ACCESS: CPT | Performed by: SURGERY

## 2020-09-02 PROCEDURE — 85730 THROMBOPLASTIN TIME PARTIAL: CPT

## 2020-09-02 RX ORDER — SODIUM CHLORIDE 9 MG/ML
INJECTION, SOLUTION INTRAVENOUS CONTINUOUS
Status: DISCONTINUED | OUTPATIENT
Start: 2020-09-02 | End: 2020-09-03

## 2020-09-02 RX ORDER — SODIUM CHLORIDE 9 MG/ML
INJECTION, SOLUTION INTRAVENOUS
Status: COMPLETED
Start: 2020-09-02 | End: 2020-09-02

## 2020-09-02 RX ORDER — HEPARIN SODIUM 10000 [USP'U]/100ML
250 INJECTION, SOLUTION INTRAVENOUS CONTINUOUS
Status: DISCONTINUED | OUTPATIENT
Start: 2020-09-02 | End: 2020-09-03

## 2020-09-02 RX ORDER — HEPARIN SODIUM 1000 [USP'U]/ML
2800 INJECTION, SOLUTION INTRAVENOUS; SUBCUTANEOUS ONCE
Status: COMPLETED | OUTPATIENT
Start: 2020-09-02 | End: 2020-09-02

## 2020-09-02 RX ORDER — ONDANSETRON 2 MG/ML
4 INJECTION INTRAMUSCULAR; INTRAVENOUS EVERY 6 HOURS PRN
Status: DISCONTINUED | OUTPATIENT
Start: 2020-09-02 | End: 2020-09-04 | Stop reason: HOSPADM

## 2020-09-02 RX ORDER — HEPARIN SODIUM 10000 [USP'U]/100ML
11.3 INJECTION, SOLUTION INTRAVENOUS CONTINUOUS
Status: DISCONTINUED | OUTPATIENT
Start: 2020-09-02 | End: 2020-09-02

## 2020-09-02 RX ORDER — SODIUM CHLORIDE 9 MG/ML
INJECTION, SOLUTION INTRAVENOUS CONTINUOUS PRN
Status: DISCONTINUED | OUTPATIENT
Start: 2020-09-02 | End: 2020-09-03

## 2020-09-02 RX ORDER — POTASSIUM CHLORIDE 7.45 MG/ML
10 INJECTION INTRAVENOUS
Status: COMPLETED | OUTPATIENT
Start: 2020-09-02 | End: 2020-09-02

## 2020-09-02 RX ORDER — SODIUM CHLORIDE 0.9 % (FLUSH) 0.9 %
10 SYRINGE (ML) INJECTION EVERY 12 HOURS SCHEDULED
Status: DISCONTINUED | OUTPATIENT
Start: 2020-09-02 | End: 2020-09-04 | Stop reason: HOSPADM

## 2020-09-02 RX ORDER — ACETAMINOPHEN 325 MG/1
650 TABLET ORAL EVERY 4 HOURS PRN
Status: DISCONTINUED | OUTPATIENT
Start: 2020-09-02 | End: 2020-09-04 | Stop reason: HOSPADM

## 2020-09-02 RX ORDER — SODIUM CHLORIDE 0.9 % (FLUSH) 0.9 %
10 SYRINGE (ML) INJECTION PRN
Status: DISCONTINUED | OUTPATIENT
Start: 2020-09-02 | End: 2020-09-04 | Stop reason: HOSPADM

## 2020-09-02 RX ADMIN — Medication 10 ML: at 09:42

## 2020-09-02 RX ADMIN — HEPARIN SODIUM 250 UNITS/HR: 10000 INJECTION, SOLUTION INTRAVENOUS at 15:59

## 2020-09-02 RX ADMIN — PERFLUTREN 1.65 MG: 6.52 INJECTION, SUSPENSION INTRAVENOUS at 09:55

## 2020-09-02 RX ADMIN — HEPARIN SODIUM 2800 UNITS: 1000 INJECTION INTRAVENOUS; SUBCUTANEOUS at 10:06

## 2020-09-02 RX ADMIN — POTASSIUM CHLORIDE 10 MEQ: 10 INJECTION, SOLUTION INTRAVENOUS at 12:59

## 2020-09-02 RX ADMIN — MUPIROCIN: 20 OINTMENT TOPICAL at 21:59

## 2020-09-02 RX ADMIN — SODIUM CHLORIDE 250 ML: 9 INJECTION, SOLUTION INTRAVENOUS at 11:05

## 2020-09-02 RX ADMIN — ALTEPLASE 1 MG/HR: 2.2 INJECTION, POWDER, LYOPHILIZED, FOR SOLUTION INTRAVENOUS at 16:02

## 2020-09-02 RX ADMIN — HEPARIN SODIUM 11.3 ML/HR: 10000 INJECTION, SOLUTION INTRAVENOUS at 13:26

## 2020-09-02 RX ADMIN — POTASSIUM CHLORIDE 10 MEQ: 10 INJECTION, SOLUTION INTRAVENOUS at 14:32

## 2020-09-02 RX ADMIN — MUPIROCIN: 20 OINTMENT TOPICAL at 10:04

## 2020-09-02 RX ADMIN — Medication 10 ML: at 21:59

## 2020-09-02 RX ADMIN — POTASSIUM CHLORIDE 10 MEQ: 10 INJECTION, SOLUTION INTRAVENOUS at 11:08

## 2020-09-02 RX ADMIN — POTASSIUM CHLORIDE 10 MEQ: 10 INJECTION, SOLUTION INTRAVENOUS at 16:58

## 2020-09-02 RX ADMIN — HEPARIN SODIUM 250 UNITS/HR: 10000 INJECTION, SOLUTION INTRAVENOUS at 16:01

## 2020-09-02 RX ADMIN — SODIUM CHLORIDE: 9 INJECTION, SOLUTION INTRAVENOUS at 15:57

## 2020-09-02 RX ADMIN — ALTEPLASE 1 MG/HR: 2.2 INJECTION, POWDER, LYOPHILIZED, FOR SOLUTION INTRAVENOUS at 15:58

## 2020-09-02 RX ADMIN — ASPIRIN 81 MG: 81 TABLET, COATED ORAL at 10:41

## 2020-09-02 ASSESSMENT — PAIN SCALES - GENERAL
PAINLEVEL_OUTOF10: 0

## 2020-09-02 NOTE — PROGRESS NOTES
4 Eyes Skin Assessment     The patient is being assess for   Shift Handoff    I agree that 2 RN's have performed a thorough Head to Toe Skin Assessment on the patient. ALL assessment sites listed below have been assessed. Areas assessed by both nurses:   [x]   Head, Face, and Ears   [x]   Shoulders, Back, and Chest, Abdomen  [x]   Arms, Elbows, and Hands   [x]   Coccyx, Sacrum, and Ischium  [x]   Legs, Feet, and Heels      **SHARE this note so that the co-signing nurse is able to place an eSignature**    Co-signer eSignature: Electronically signed by Bruce Cancino RN on 9/2/20 at 7:37 AM EDT    Does the Patient have Skin Breakdown?   No          Edilberto Prevention initiated:  Yes   Wound Care Orders initiated:  No      [facility] nurse consulted for Pressure Injury (Stage 3,4, Unstageable, DTI, NWPT, Complex wounds)and New or Established Ostomies:  NA      Primary Nurse eSignature: Electronically signed by Misty Figueroa RN on 9/2/20 at 7:44 AM EDT

## 2020-09-02 NOTE — CONSULTS
Vascular Surgery Consultation    Date of Admission:  9/1/2020  2:34 PM  Date of Consultation:  9/2/2020    PCP:  Melania Ivan MD       Chief Complaint: SOB, RILEY    History of Present Illness: We are asked to see this patient in consultation by Dr. Venecia Hurtado regarding acute PE. Constantin Duran is a 70 y.o. female who has a prior hsitory of DVT/PE. She reports abrupt onset SOB and RILEY 4 days ago. She denies any current leg swelling although she did fall several days ago on right knee. ER evaluation included CTA showing large amount of clot in bilateral pulmonary arteries with saddle PE. Echo consistent with right heart strain and troponin slightly elevated. Past Medical History:  Past Medical History:   Diagnosis Date    Hx of blood clots 2015    Hypertension        Past Surgical History:  Past Surgical History:   Procedure Laterality Date    COLONOSCOPY  2006    COLONOSCOPY  4/19/16    colon polyp    EYE SURGERY      cataracts bilateral    HYSTERECTOMY         Home Medications:   Prior to Admission medications    Medication Sig Start Date End Date Taking? Authorizing Provider   labetalol (NORMODYNE) 100 MG tablet TAKE 1 TABLET TWICE DAILY 8/13/20  Yes TAYLOR Douglas MD   hydrochlorothiazide (MICROZIDE) 12.5 MG capsule TAKE 1 CAPSULE EVERY DAY 4/13/20  Yes TAYLOR Douglas MD   amLODIPine (NORVASC) 10 MG tablet Take 1 tablet by mouth daily 10/1/19  Yes Melania Ivan MD   aspirin 81 MG tablet Take 1 tablet by mouth daily 8/28/17  Yes Melania Ivan MD        Facility Administered Medications:    mupirocin   Nasal BID    potassium chloride  10 mEq Intravenous Q1H    aspirin  81 mg Oral Daily    sodium chloride flush  10 mL Intravenous 2 times per day    potassium chloride  20 mEq Oral Once       Allergies:  Patient has no known allergies. Social History:      Social History     Socioeconomic History    Marital status:       Spouse name: Not on file    Number rubs.  Cardiovascular: regular, no murmur. No carotid bruits. Mild bilateral symmetrical edema. Pulses:    femoral   RIGHT 2   LEFT 2   GI: abdomen soft, nondistended, no organomegaly. Musculoskeletal: strength and tone normal.  Extremities: warm and pink. Skin: no dermatitis or ulceration. Neuro/psychiatric: grossly intact. MEDICAL DECISION MAKING/TESTING        CT: images personally reviewed. Saddle PE with large volume clot in central pulmonary veins bilaterally.          Labs:   CBC:   Recent Labs     09/01/20  1511 09/02/20  0347   WBC 12.3* 10.8   HGB 14.2 14.2   HCT 43.1 42.2   MCV 79.4* 78.8*    289     BMP:   Recent Labs     09/01/20  1511 09/02/20  0347   * 135*   K 3.4* 3.1*   CL 93* 97*   CO2 24 24   BUN 23* 15   CREATININE 1.1 0.8   CALCIUM 9.5 9.1   MG  --  2.30     Cardiac Enzymes:   Recent Labs     09/01/20  1511 09/02/20  0347 09/02/20  0826   TROPONINI 0.03* 0.02* 0.02*     PT/INR:   Recent Labs     09/01/20  1511   PROTIME 13.1   INR 1.13     APTT:   Recent Labs     09/01/20  1511 09/01/20  2331 09/02/20  0826   APTT 29.8 102.0* 42.6*     Liver Profile:  Lab Results   Component Value Date    AST 18 09/01/2020    ALT 12 09/01/2020    BILIDIR <0.2 06/15/2020    BILITOT 0.6 09/01/2020    ALKPHOS 92 09/01/2020     Lab Results   Component Value Date    CHOL 178 06/15/2020    HDL 42 06/15/2020    TRIG 90 06/15/2020     TSH:  Lab Results   Component Value Date    TSH 3.55 04/11/2019     UA:   Lab Results   Component Value Date    COLORU Yellow 09/01/2020    PHUR 6.0 09/01/2020    WBCUA 3-5 09/01/2020    RBCUA 11-20 09/01/2020    BACTERIA 1+ 09/01/2020    CLARITYU Clear 09/01/2020    SPECGRAV 1.015 09/01/2020    LEUKOCYTESUR SMALL 09/01/2020    UROBILINOGEN 0.2 09/01/2020    BILIRUBINUR Negative 09/01/2020    BLOODU TRACE-INTACT 09/01/2020    GLUCOSEU Negative 09/01/2020    AMORPHOUS 1+ 09/01/2020     Troponin:  0.06      Diagnosis:  Acute Submassive PE with right heart strain Plan: Catheter directed thrombolysis. I discussed the following:    I have explained the following to the patient and his/her family: Thrombolysis is the use of medication to dissolve or break down a blood clot that is blocking blood flow. Catheter directed means that the thrombolysis will be carried out by inserting a catheter into the blood vessel to deliver the medication directly to the blood clot. Thrombolysis is performed as an extra step to an angiogram procedure. Thrombolysis is used instead of surgery to treat blood clots. The risks and complications with this procedure can include but are not limited to the following. Common risks and complications include:   Minor pain, bruising and/or infection from the IV cannula. This may require treatment with antibiotics. Pain or discomfort at the puncture site. This may require medication. Minor bleeding or bruising around the catheter. This is usually stopped by applying pressure and/or ice to the catheter insertion site. Failure of the thrombolytic medication to completely dissolve the blood clot. Surgery may be required to remove the blood clot. Less common risks and complications include:  Infection, requiring antibiotics and further treatment. Damage to surrounding structures such as blood vessels, organs and muscles, requiring further treatment. Stroke or spontaneous bleeding in other organs, such as stomach and bowel. This is due to the thrombolytic and blood thinning medications given during the procedure. The procedure will be stopped and surgery may be required to stop the bleeding. A blood clot or excessive bleeding from the puncture site. This may require other treatment and/or corrective surgery. An allergy to injected drugs, requiring further treatment. The procedure may not be possible due to medical and/or technical reasons.

## 2020-09-02 NOTE — PROGRESS NOTES
Patient's brother at bedside and waiting for Vascular MD to round and decision to be made concerning if patient will have a procedure or not.  Ranjana Rodríguez

## 2020-09-02 NOTE — PROGRESS NOTES
RN called Vascular office concerning MD  Not having rounded on this patient yet. Family at bedside waiting for MD to round. Patient remains NPO. RN spoke with Devaughn Rollins MA at Dr Chris Perales office and  Devaughn Rollins will page Dr Ball Sensing concerning this consult.   Rajnana Rodríguez

## 2020-09-02 NOTE — CARE COORDINATION
CASE MANAGEMENT INITIAL ASSESSMENT      Reviewed chart and completed assessment with:  patient and brother  Explained Case Management role/services. Primary contact information:  Martin Guzman [phone]    Admit date/status: 9/1/20  Diagnosis: pulmonary embolism   Is this a Readmission?:  If so, please indicate possible factors that led to readmission. NO    [facility] and Medicaid secondary   Precert required for SNF -yes 3 night stay required -no    Living arrangements, Adls, care needs, prior to admission: pt lives alone in a second floor apartment. Has an elevator to enter. Independent in all ADL's     Transportation: private    [address] at home: none    Services in the home and/or outpatient, prior to admission: none    PT/OT recs: [facility] [place] [facility] Notification (HEN): not initiated    Barriers to discharge: none    Plan/comments:   Spoke to patient and brother at bedside. Pt states she is normally very independent and drives. Pt denies any current needs at this time. Please advise should any needs arise.      [facility] on chart for MD luisito Cha RN

## 2020-09-02 NOTE — PROGRESS NOTES
Dr Wally Hoffman returned call to ICU RN and will be into see patient and family member. MD will call cath lab concerning procedure.  ICU Unit clerk called to place Dr Wally Hoffman on patient's Treatment Team list. Marcella Herrmann

## 2020-09-02 NOTE — PROGRESS NOTES
Stat labs drawn by Lab. Consent signed by patient for procedure and placed in chart. Cath Lab RN at bedside & explained about going for the procedure. Brother at bedside, questions answered. Patient to cath lab via bed by Cath Lab RN Ana Paula Dumont ICU RN. Bother to Cath lab waiting room.  Caitlin Woods

## 2020-09-02 NOTE — PROGRESS NOTES
Patient admitted to B2 room 262 from ED for bilateral pulmonary embolism on continuous heparin gtt (possible vascular intervention tomorrow), strict bedrest; Currently on room air maintaining SpO2 around 90%; a&Ox4.  Head to toe assessment completed (see flow sheets), Call light accessible, Will continue to monitor

## 2020-09-02 NOTE — PROGRESS NOTES
4 Eyes Skin Assessment     The patient is being assess for   Shift Handoff    I agree that 2 RN's have performed a thorough Head to Toe Skin Assessment on the patient. ALL assessment sites listed below have been assessed. Areas assessed by both nurses:   [x]   Head, Face, and Ears   [x]   Shoulders, Back, and Chest, Abdomen  [x]   Arms, Elbows, and Hands   [x]   Coccyx, Sacrum, and Ischium  [x]   Legs, Feet, and Heels      **SHARE this note so that the co-signing nurse is able to place an eSignature**    Co-signer eSignature: Electronically signed by Lora Moncada RN on 9/2/20 at 10:16 PM EDT    Does the Patient have Skin Breakdown?   No          Edilberto Prevention initiated:  Yes   Wound Care Orders initiated:  No      C nurse consulted for Pressure Injury (Stage 3,4, Unstageable, DTI, NWPT, Complex wounds)and New or Established Ostomies:  NA      Primary Nurse eSignature: Electronically signed by Lavonne Simpson RN on 9/2/20 at 7:12 PM EDT

## 2020-09-02 NOTE — PROGRESS NOTES
Returned to unit from [facility] OR via bed by cath lab staff. EKOS in place & infusing through right groin sites. Site clear & tegaderm dressing intact, no bleeding or bruising noted at site. CMS checks to dudley feet, moderate pedal pulses, skin warm & dry. Sensation WNL. Vitals done q 15 minutes. Teaching done with patient and brother concerning 8 hour infusion time, bedrest, clear liquid diet and bedrest, both verbalized understanding.  Anjel Moura

## 2020-09-02 NOTE — PROGRESS NOTES
Hospitalist Progress Note    Date of Admission: 9/1/2020    Chief Complaint: SOB    Hospital Course: 70 y.o. female who presented to [facility] with sob. Pt noted sudden onset on Saturday. No travel but does note falling once approx 1.5 weeks ago(she lives at independent living facility) and bruising her right knee. She has NOT been bedbound since. This weekend, she noted progressively worsening RILEY. No cp/LOC. No f/chills, no cough, no n/v/diarrhea. She notes prior DVT(unclear etiology) around 2015(came to [address] [place] [address]) and was tx'd for approx 4 months. She denies hx of cancer and is she states that she is uptodate on pap/mamogram/cscope. Subjective: Minimal SOB at rest. Minimal hypoxia. BP stable. Plan for EKOS. Labs:   Recent Labs     09/01/20  1511 09/02/20  0347 09/02/20  1656   WBC 12.3* 10.8 10.3   HGB 14.2 14.2 14.8   HCT 43.1 42.2 44.2    289 252     Recent Labs     09/01/20  1511 09/02/20  0347 09/02/20  2209   * 135*  --    K 3.4* 3.1* 3.6   CL 93* 97*  --    CO2 24 24  --    BUN 23* 15  --    CREATININE 1.1 0.8  --    CALCIUM 9.5 9.1  --      Recent Labs     09/01/20  1511   AST 18   ALT 12   BILITOT 0.6   ALKPHOS 92     Recent Labs     09/01/20  1511   INR 1.13       Physical Exam Performed:    /71   Pulse 94   Temp 98.2 °F (36.8 °C) (Oral)   Resp (!) 35   Ht 5' 4\" (1.626 m)   Wt 204 lb 12.9 oz (92.9 kg)   SpO2 94%   BMI 35.16 kg/m²   General appearance:  No apparent distress, appears stated age and cooperative. HEENT:  Normal cephalic, atraumatic without obvious deformity. Pupils equal, round, and reactive to light. Extra ocular muscles intact. Conjunctivae/corneas clear. Neck: Supple, with full range of motion. No jugular venous distention. Trachea midline. Respiratory:  Normal respiratory effort. Clear to auscultation, bilaterally without Rales/Wheezes/Rhonchi.   Cardiovascular:  Regular rate and rhythm with normal S1/S2 without murmurs, rubs or gallops. Abdomen: Soft, non-tender, non-distended with normal bowel sounds. Musculoskeletal:  No clubbing, cyanosis or edema bilaterally. Full range of motion without deformity. Skin: Skin color, texture, turgor normal.  No rashes or lesions. Neurologic:  Neurovascularly intact without any focal sensory/motor deficits. Cranial nerves: II-XII intact, grossly non-focal.  Psychiatric:  Alert and oriented, thought content appropriate, normal insight  Capillary Refill: Brisk,< 3 seconds   Peripheral Pulses: +2 palpable, equal bilaterally     Assessment/Plan:    Active Hospital Problems    Diagnosis    Acute saddle pulmonary embolism with acute cor pulmonale (HCC) [I26.02]    Pulmonary embolism, bilateral (HCC) [I26.99]     Acute Pulmonary Embolism: With extensive clot burden. Vascular Surgery consulted, plan for EKOS. Continue heparin drip. Check BLE doppler. Echo shows significant RV strain. Will need lifelong AC.      HTN-stable  Held norvasc, hctz, labetalol for now    Hypokalemia- mild  Replaced, monitored labs    DVT Prophylaxis: heparin drip  Diet: DIET CLEAR LIQUID;  Code Status: Full Code  PT/OT Eval Status: NA    Dispo - ICU    Meenu Barrios MD

## 2020-09-02 NOTE — PROGRESS NOTES
3 RNs attempted to start 2nd IV site without success. Patient stuck x6 & uses ultrasound without success.  Coco Crook

## 2020-09-02 NOTE — PROGRESS NOTES
4 Eyes Skin Assessment     The patient is being assess for   Admission    I agree that 2 RN's have performed a thorough Head to Toe Skin Assessment on the patient. ALL assessment sites listed below have been assessed. Areas assessed by both nurses:   [x]   Head, Face, and Ears   [x]   Shoulders, Back, and Chest, Abdomen  [x]   Arms, Elbows, and Hands   [x]   Coccyx, Sacrum, and Ischium  [x]   Legs, Feet, and Heels        Abrasion right lower leg    **SHARE this note so that the co-signing nurse is able to place an eSignature**    Co-signer eSignature: Electronically signed by Clifford Baez RN on 9/1/20 at 9:16 PM EDT    Does the Patient have Skin Breakdown?   No          Edilberto Prevention initiated:  No   Wound Care Orders initiated:  NA      [facility] nurse consulted for Pressure Injury (Stage 3,4, Unstageable, DTI, NWPT, Complex wounds)and New or Established Ostomies:  NA      Primary Nurse eSignature: Electronically signed by Renetta Ambriz RN on 9/1/20 at 8:59 PM EDT

## 2020-09-03 ENCOUNTER — APPOINTMENT (OUTPATIENT)
Dept: VASCULAR LAB | Age: 75
DRG: 175 | End: 2020-09-03
Payer: MEDICARE

## 2020-09-03 LAB
ANION GAP SERPL CALCULATED.3IONS-SCNC: 12 MMOL/L (ref 3–16)
APTT: 55.6 SEC (ref 24.2–36.2)
BASOPHILS ABSOLUTE: 0.1 K/UL (ref 0–0.2)
BASOPHILS RELATIVE PERCENT: 1.2 %
BUN BLDV-MCNC: 18 MG/DL (ref 7–20)
CALCIUM SERPL-MCNC: 8.5 MG/DL (ref 8.3–10.6)
CHLORIDE BLD-SCNC: 102 MMOL/L (ref 99–110)
CO2: 23 MMOL/L (ref 21–32)
CREAT SERPL-MCNC: 0.7 MG/DL (ref 0.6–1.2)
EOSINOPHILS ABSOLUTE: 0.1 K/UL (ref 0–0.6)
EOSINOPHILS RELATIVE PERCENT: 1.2 %
FIBRINOGEN: 164 MG/DL (ref 200–397)
FIBRINOGEN: 235 MG/DL (ref 200–397)
FIBRINOGEN: 266 MG/DL (ref 200–397)
GFR AFRICAN AMERICAN: >60
GFR NON-AFRICAN AMERICAN: >60
GLUCOSE BLD-MCNC: 111 MG/DL (ref 70–99)
HCT VFR BLD CALC: 39.9 % (ref 36–48)
HCT VFR BLD CALC: 43 % (ref 36–48)
HCT VFR BLD CALC: 43.7 % (ref 36–48)
HEMOGLOBIN: 13.2 G/DL (ref 12–16)
HEMOGLOBIN: 14.1 G/DL (ref 12–16)
HEMOGLOBIN: 14.5 G/DL (ref 12–16)
LYMPHOCYTES ABSOLUTE: 1.4 K/UL (ref 1–5.1)
LYMPHOCYTES RELATIVE PERCENT: 12.9 %
MCH RBC QN AUTO: 26 PG (ref 26–34)
MCH RBC QN AUTO: 26.3 PG (ref 26–34)
MCH RBC QN AUTO: 26.4 PG (ref 26–34)
MCHC RBC AUTO-ENTMCNC: 32.7 G/DL (ref 31–36)
MCHC RBC AUTO-ENTMCNC: 33 G/DL (ref 31–36)
MCHC RBC AUTO-ENTMCNC: 33.2 G/DL (ref 31–36)
MCV RBC AUTO: 79.2 FL (ref 80–100)
MCV RBC AUTO: 79.4 FL (ref 80–100)
MCV RBC AUTO: 79.9 FL (ref 80–100)
MONOCYTES ABSOLUTE: 1 K/UL (ref 0–1.3)
MONOCYTES RELATIVE PERCENT: 8.5 %
NEUTROPHILS ABSOLUTE: 8.6 K/UL (ref 1.7–7.7)
NEUTROPHILS RELATIVE PERCENT: 76.2 %
PDW BLD-RTO: 15 % (ref 12.4–15.4)
PDW BLD-RTO: 15.2 % (ref 12.4–15.4)
PDW BLD-RTO: 15.5 % (ref 12.4–15.4)
PLATELET # BLD: 194 K/UL (ref 135–450)
PLATELET # BLD: 199 K/UL (ref 135–450)
PLATELET # BLD: 238 K/UL (ref 135–450)
PMV BLD AUTO: 7.6 FL (ref 5–10.5)
PMV BLD AUTO: 7.7 FL (ref 5–10.5)
PMV BLD AUTO: 7.9 FL (ref 5–10.5)
POTASSIUM REFLEX MAGNESIUM: 3.8 MMOL/L (ref 3.5–5.1)
RBC # BLD: 4.99 M/UL (ref 4–5.2)
RBC # BLD: 5.42 M/UL (ref 4–5.2)
RBC # BLD: 5.51 M/UL (ref 4–5.2)
SODIUM BLD-SCNC: 137 MMOL/L (ref 136–145)
WBC # BLD: 11.3 K/UL (ref 4–11)
WBC # BLD: 13.2 K/UL (ref 4–11)
WBC # BLD: 9.4 K/UL (ref 4–11)

## 2020-09-03 PROCEDURE — 85027 COMPLETE CBC AUTOMATED: CPT

## 2020-09-03 PROCEDURE — 85025 COMPLETE CBC W/AUTO DIFF WBC: CPT

## 2020-09-03 PROCEDURE — 2580000003 HC RX 258: Performed by: SURGERY

## 2020-09-03 PROCEDURE — 6370000000 HC RX 637 (ALT 250 FOR IP): Performed by: INTERNAL MEDICINE

## 2020-09-03 PROCEDURE — 80048 BASIC METABOLIC PNL TOTAL CA: CPT

## 2020-09-03 PROCEDURE — 94761 N-INVAS EAR/PLS OXIMETRY MLT: CPT

## 2020-09-03 PROCEDURE — 85384 FIBRINOGEN ACTIVITY: CPT

## 2020-09-03 PROCEDURE — 2500000003 HC RX 250 WO HCPCS: Performed by: SURGERY

## 2020-09-03 PROCEDURE — 2060000000 HC ICU INTERMEDIATE R&B

## 2020-09-03 PROCEDURE — 99232 SBSQ HOSP IP/OBS MODERATE 35: CPT | Performed by: INTERNAL MEDICINE

## 2020-09-03 PROCEDURE — 2580000003 HC RX 258: Performed by: INTERNAL MEDICINE

## 2020-09-03 PROCEDURE — 85730 THROMBOPLASTIN TIME PARTIAL: CPT

## 2020-09-03 PROCEDURE — 36415 COLL VENOUS BLD VENIPUNCTURE: CPT

## 2020-09-03 PROCEDURE — 93970 EXTREMITY STUDY: CPT

## 2020-09-03 PROCEDURE — 2700000000 HC OXYGEN THERAPY PER DAY

## 2020-09-03 RX ORDER — HEPARIN SODIUM 10000 [USP'U]/100ML
11.3 INJECTION, SOLUTION INTRAVENOUS CONTINUOUS
Status: DISCONTINUED | OUTPATIENT
Start: 2020-09-03 | End: 2020-09-03

## 2020-09-03 RX ADMIN — HEPARIN SODIUM 11.3 ML/HR: 10000 INJECTION, SOLUTION INTRAVENOUS at 01:29

## 2020-09-03 RX ADMIN — Medication 10 ML: at 07:27

## 2020-09-03 RX ADMIN — MUPIROCIN: 20 OINTMENT TOPICAL at 07:27

## 2020-09-03 RX ADMIN — Medication 10 ML: at 21:20

## 2020-09-03 RX ADMIN — ASPIRIN 81 MG: 81 TABLET, COATED ORAL at 07:27

## 2020-09-03 RX ADMIN — APIXABAN 10 MG: 5 TABLET, FILM COATED ORAL at 21:20

## 2020-09-03 RX ADMIN — APIXABAN 10 MG: 5 TABLET, FILM COATED ORAL at 09:24

## 2020-09-03 ASSESSMENT — PAIN SCALES - GENERAL
PAINLEVEL_OUTOF10: 0

## 2020-09-03 NOTE — PROGRESS NOTES
Notified Dr. Rere Colindres, on call vascular, per orders of pt respirations in the 30s and increase of O2 from 2L to 2.5L to maintain O2 above 94%. Will continue to monitor.

## 2020-09-03 NOTE — PROGRESS NOTES
Pulmonary & Critical Care Medicine ICU Progress Note      Events of Last 24 hours: Patient has remained afebrile and hemodynamically stable. Currently she denies chest pain or shortness of breath. She underwent catheter directed thrombolysis by vascular surgery on 9/2. She is off oxygen. Lower extremity venous Doppler showed an acute DVT in the right femoral region. Echocardiogram with severe pulmonary hypertension, Stinson sign. Invasive Lines:   None      IV:      Vitals:  BP (!) 153/83   Pulse 94   Temp 98.5 °F (36.9 °C) (Oral)   Resp 24   Ht 5' 4\" (1.626 m)   Wt 204 lb 12.9 oz (92.9 kg)   SpO2 95%   BMI 35.16 kg/m²         Intake/Output Summary (Last 24 hours) at 9/3/2020 1340  Last data filed at 9/3/2020 1055  Gross per 24 hour   Intake 1616 ml   Output 600 ml   Net 1016 ml       EXAM:  General: Very pleasant, obese. Well nourished, well developed, in no respiratory distress. Eyes:  No scleral icterus or periorbital edema. Head: Atraumatic, normocephalic. ENMT: Class III airway, missing teeth. No bleeding of lips or gums. Mucosa pink and moist. No ulceration. No oral candidiasis. Neck: Short and large neck JVD. Lymphadenopathy:  Deferred. CV: S1, S2, no murmurs, gallops, clicks or rubs. Pulses palpable and symmetrical.    Respiratory: Clear to auscultation  Chest: Equal rise and fall of chest.   GI:  Deferred. : Deferred. Skin: No rashes, lesions, bruises, induration, discharge. Color, texture, turgor normal.  Musculoskeletal: Supple, no contractures or muscle atrophy. No clubbing or cyanosis of nailbeds. No joint swelling, redness. Edema: None  Neuro: Detailed exam not performed, moves all extremities.    Lines: Peripheral       Medications:  Scheduled Meds:   apixaban  10 mg Oral BID    Followed by   Payton Delgado ON 9/10/2020] apixaban  5 mg Oral BID    mupirocin   Nasal BID    sodium chloride flush  10 mL Intravenous 2 times per day    aspirin  81 mg Oral Daily    potassium chloride  20 mEq Oral Once       PRN Meds:  sodium chloride flush, acetaminophen, ondansetron, polyethylene glycol, promethazine **OR** [DISCONTINUED] ondansetron    Results:  CBC:   Recent Labs     09/02/20  2210 09/03/20  0426 09/03/20  1326   WBC 12.7* 11.3* 9.4   HGB 14.0 13.2 14.5   HCT 42.9 39.9 43.7   MCV 80.2 79.9* 79.2*    194 199     BMP:   Recent Labs     09/01/20  1511 09/02/20  0347 09/02/20  2209 09/03/20  0425   * 135*  --  137   K 3.4* 3.1* 3.6 3.8   CL 93* 97*  --  102   CO2 24 24  --  23   BUN 23* 15  --  18   CREATININE 1.1 0.8  --  0.7     LIVER PROFILE:   Recent Labs     09/01/20  1511   AST 18   ALT 12   BILITOT 0.6   ALKPHOS 92     PT/INR:   Recent Labs     09/01/20  1511   PROTIME 13.1   INR 1.13     APTT:   Recent Labs     09/02/20  1507 09/02/20  2210 09/03/20  0425   APTT 66.1* 36.0 55.6*     UA:  Recent Labs     09/01/20  1755   COLORU Yellow   PHUR 6.0   WBCUA 3-5   RBCUA 11-20*   BACTERIA 1+*   CLARITYU Clear   SPECGRAV 1.015   LEUKOCYTESUR SMALL*   UROBILINOGEN 0.2   BILIRUBINUR Negative   BLOODU TRACE-INTACT*   GLUCOSEU Negative   AMORPHOUS 1+       Cultures:  None    Films:  CXR reviewed by me      Assessment and plan:  Bilateral submassive pulmonary embolism, severe pulmonary hypertension. Status post catheter directed thrombolysis. Vascular surgery recommending switching over to NOAC. Acute right femoral DVT. Anticoagulation. Elevated troponin, elevated proBNP. From the massive pulmonary embolism. Essential hypertension. Hold antihypertensives, possibly resume in a.m. Hyponatremia. Mild. Follow profile. Hypokalemia. Replace and monitor  Elevated glucose. Follow profile. Probably a stress response. Leukocytosis. Mild. Follow profile. Obesity. Should make attempts to lose weight. DVT prophylaxis. Presently on a heparin drip.     Since this is a second episode of unprovoked severe thromboembolism, the patient should be on lifelong

## 2020-09-03 NOTE — PROGRESS NOTES
Vascular    S/P PA thrombolysis  Reports feeling better  R groin without bleeding or hematoma    S/P PA lysis  On Heparin- ok to transition to po anticlagulation   Activity as tolerated

## 2020-09-03 NOTE — OP NOTE
[address] [address]                                OPERATIVE REPORT    PATIENT NAME: Domi Yip                    :        1945  MED REC NO:   [id number]                          ROOM:       8375  ACCOUNT NO:   [de-identified]                           ADMIT DATE: 2020  PROVIDER:     Becka Saldana MD    DATE OF PROCEDURE:  2020    PREOPERATIVE DIAGNOSIS:  Acute saddle pulmonary embolus with acute cor  pulmonale. POSTOPERATIVE DIAGNOSIS:  Acute saddle pulmonary embolus with acute cor  pulmonale. PROCEDURES:  1. Ultrasound-guided right femoral venous access x2.  2.  Insertion of catheters into the right and left pulmonary arteries. 3.  Institution of bilateral pulmonary artery thrombolysis using 12 cm  infusion length EKOS catheters. ANESTHESIA:  Local with moderate monitored sedation. INDICATIONS:  The patient is a 62-year-old female who presented with  acute pulmonary embolus. CT imaging demonstrated acute saddle pulmonary  embolus by echocardiogram and cardiac enzymes. The patient has evidence  of right heart strain. The patient is brought to the angio suite at  this time to undergo placement of bilateral pulmonary artery  thrombolytic catheters and institution of thrombolytic therapy. OPERATIVE PROCEDURE:  The patient was brought to the angio suite, placed  in supine position. Under my direct supervision, Versed and fentanyl  were administered for moderate sedation. The patient was monitored by  an independent trained nurse observer using continuous blood pressure,  EKG, and pulse oximetry. I spent 20 minutes face-to-face with the  patient providing and directing sedation. The groins were prepped and  draped in sterile fashion. Ultrasound was performed of the right  femoral region. The common femoral vein was identified. It was easily  compressible and free of thrombus.   This was accessed twice and two  6-Lith[place]ian sheaths were introduced into the femoral vein. Digital copies  of the ultrasound images were saved and placed in the patient's record. Using a pigtail catheter and a Bentson wire, the catheter was advanced  into the right atrium, into the right ventricle, and then the pulmonary  outflow tract. One wire was directed into the right distal pulmonary  artery, one was directed in the left distal pulmonary artery. This was  facilitated using an angled glide catheter to advance the wires. With  the wires in the appropriate positions, two 12 cm infusion length EKOS  thrombolytic infusion catheters were advanced over the Bentson wires. The wires were then removed. The ultrasound wires were advanced through  the EKOS catheters. The sheaths were then secured to the skin using 2-0  silk sutures. The catheters were secured to the sheaths using  Steri-Strips. Sterile occlusive dressing was then applied. The  pulmonary artery thrombolysis was then instituted at 1 mg/hr via each  catheter with 250 units of heparin infused through the sideport of each  venous sheath. At the conclusion of the procedure, the patient was transferred back to  the intensive care unit in stable condition.   Estimated blood loss throughout the procedure was minimal.        Fran Gonzalez MD    D: 09/02/2020 16:17:02       T: 09/02/2020 16:23:34     [email or web address]  Job#: [id number]     Doc#: [id number]    CC:

## 2020-09-03 NOTE — PROGRESS NOTES
4 Eyes Skin Assessment     The patient is being assess for   Transfer to New Unit    I agree that 2 RN's have performed a thorough Head to Toe Skin Assessment on the patient. ALL assessment sites listed below have been assessed. Areas assessed by both nurses:   [x]   Head, Face, and Ears   [x]   Shoulders, Back, and Chest, Abdomen  [x]   Arms, Elbows, and Hands   [x]   Coccyx, Sacrum, and Ischium  [x]   Legs, Feet, and Heels          **SHARE this note so that the co-signing nurse is able to place an eSignature**    Co-signer eSignature:Temitope Vega  Does the Patient have Skin Breakdown?   No          Edilberto Prevention initiated:  No   Wound Care Orders initiated:  No      WOC nurse consulted for Pressure Injury (Stage 3,4, Unstageable, DTI, NWPT, Complex wounds)and New or Established Ostomies:  No      Primary Nurse eSignature: Electronically signed by Godwin Allison RN on 9/3/20 at 12:08 PM EDT       eyes

## 2020-09-03 NOTE — PROGRESS NOTES
Patient being transferred from [facility] to  room 441. Vital signs and assessment are stable at the time of transfer. Report given to [facility].

## 2020-09-03 NOTE — PROGRESS NOTES
09/02/2020 @ [address] Vascular Surgery paged per ALVIN Bethea's request  Coni Armenta

## 2020-09-03 NOTE — PROGRESS NOTES
Hospitalist Progress Note    Date of Admission: 9/1/2020    Chief Complaint: SOB    Hospital Course: 70 y.o. female who presented to [facility] with sob. Pt noted sudden onset on Saturday. No travel but does note falling once approx 1.5 weeks ago(she lives at independent living facility) and bruising her right knee. She has NOT been bedbound since. This weekend, she noted progressively worsening RILEY. No cp/LOC. No f/chills, no cough, no n/v/diarrhea. She notes prior DVT(unclear etiology) around 2015(came to [facility]) and was tx'd for approx 4 months. She denies hx of cancer and is she states that she is uptodate on pap/mamogram/cscope. Subjective: She denies any significant shortness of breath. No hypoxia. She completed [place] overnight. She has been transferred out of the ICU. She does report some exertional symptoms of lightheadedness on standing. Labs:   Recent Labs     09/02/20  1656 09/02/20  2210 09/03/20  0426   WBC 10.3 12.7* 11.3*   HGB 14.8 14.0 13.2   HCT 44.2 42.9 39.9    232 194     Recent Labs     09/01/20  1511 09/02/20  0347 09/02/20  2209 09/03/20  0425   * 135*  --  137   K 3.4* 3.1* 3.6 3.8   CL 93* 97*  --  102   CO2 24 24  --  23   BUN 23* 15  --  18   CREATININE 1.1 0.8  --  0.7   CALCIUM 9.5 9.1  --  8.5     Recent Labs     09/01/20  1511   AST 18   ALT 12   BILITOT 0.6   ALKPHOS 92     Recent Labs     09/01/20  1511   INR 1.13       Physical Exam Performed:    BP (!) 153/83   Pulse 94   Temp 98.5 °F (36.9 °C) (Oral)   Resp 24   Ht 5' 4\" (1.626 m)   Wt 204 lb 12.9 oz (92.9 kg)   SpO2 95%   BMI 35.16 kg/m²   General appearance:  No apparent distress, appears stated age and cooperative. HEENT:  Normal cephalic, atraumatic without obvious deformity. Pupils equal, round, and reactive to light. Extra ocular muscles intact. Conjunctivae/corneas clear. Neck: Supple, with full range of motion. No jugular venous distention. Trachea midline.   Respiratory: Normal respiratory effort. Clear to auscultation, bilaterally without Rales/Wheezes/Rhonchi. Cardiovascular:  Regular rate and rhythm with normal S1/S2 without murmurs, rubs or gallops. Abdomen: Soft, non-tender, non-distended with normal bowel sounds. Musculoskeletal:  No clubbing, cyanosis or edema bilaterally. Full range of motion without deformity. Skin: Skin color, texture, turgor normal.  No rashes or lesions. Neurologic:  Neurovascularly intact without any focal sensory/motor deficits. Cranial nerves: II-XII intact, grossly non-focal.  Psychiatric:  Alert and oriented, thought content appropriate, normal insight  Capillary Refill: Brisk,< 3 seconds   Peripheral Pulses: +2 palpable, equal bilaterally     Assessment/Plan:    Active Hospital Problems    Diagnosis    Acute saddle pulmonary embolism with acute cor pulmonale (HCC) [I26.02]    Elevated brain natriuretic peptide (BNP) level [R79.89]    Elevated troponin [R79.89]    History of pulmonary embolism [Z86.711]    Pulmonary embolism, bilateral (HCC) [I26.99]    Essential hypertension [I10]    Obesity (BMI 35.0-39.9 without comorbidity) [E66.9]     Acute Pulmonary Embolism: With extensive clot burden. Vascular Surgery consulted, completed EKOS. Transition back to heparin drip and subsequently started on Eliquis. She will require life does demonstrate a DVT. She has no significant lower extremity edema. Counseled on her extremity compression stockings for any edema. Will resume ambulation as tolerated to assess for prior to discharge. HTN-stable  Held norvasc, hctz, labetalol for now    Hypokalemia- mild  Replaced, monitored labs    DVT Prophylaxis: heparin drip  Diet: DIET CARDIAC;  Code Status: Full Code  PT/OT Eval Status: NA    Dispo -she will require further monitoring to assess for activity tolerance given the significant clot burden. Anticipate discharge in 1 to 2 days.     Ana An MD

## 2020-09-04 ENCOUNTER — TELEPHONE (OUTPATIENT)
Dept: PULMONOLOGY | Age: 75
End: 2020-09-04

## 2020-09-04 VITALS
HEIGHT: 64 IN | DIASTOLIC BLOOD PRESSURE: 81 MMHG | BODY MASS INDEX: 34.98 KG/M2 | TEMPERATURE: 98.4 F | RESPIRATION RATE: 16 BRPM | WEIGHT: 204.9 LBS | SYSTOLIC BLOOD PRESSURE: 161 MMHG | OXYGEN SATURATION: 94 % | HEART RATE: 90 BPM

## 2020-09-04 PROBLEM — I27.20 PULMONARY HTN (HCC): Status: ACTIVE | Noted: 2020-09-04

## 2020-09-04 PROBLEM — I82.412 ACUTE DEEP VEIN THROMBOSIS (DVT) OF FEMORAL VEIN OF LEFT LOWER EXTREMITY (HCC): Status: ACTIVE | Noted: 2020-09-04

## 2020-09-04 LAB
ANION GAP SERPL CALCULATED.3IONS-SCNC: 14 MMOL/L (ref 3–16)
BASOPHILS ABSOLUTE: 0.1 K/UL (ref 0–0.2)
BASOPHILS RELATIVE PERCENT: 0.9 %
BUN BLDV-MCNC: 16 MG/DL (ref 7–20)
CALCIUM SERPL-MCNC: 8.5 MG/DL (ref 8.3–10.6)
CHLORIDE BLD-SCNC: 95 MMOL/L (ref 99–110)
CO2: 23 MMOL/L (ref 21–32)
CREAT SERPL-MCNC: 0.7 MG/DL (ref 0.6–1.2)
EOSINOPHILS ABSOLUTE: 0.5 K/UL (ref 0–0.6)
EOSINOPHILS RELATIVE PERCENT: 5.6 %
FIBRINOGEN: 238 MG/DL (ref 200–397)
FIBRINOGEN: 262 MG/DL (ref 200–397)
FIBRINOGEN: 294 MG/DL (ref 200–397)
GFR AFRICAN AMERICAN: >60
GFR NON-AFRICAN AMERICAN: >60
GLUCOSE BLD-MCNC: 91 MG/DL (ref 70–99)
HCT VFR BLD CALC: 38.2 % (ref 36–48)
HCT VFR BLD CALC: 38.3 % (ref 36–48)
HCT VFR BLD CALC: 40 % (ref 36–48)
HCT VFR BLD CALC: 40.6 % (ref 36–48)
HEMOGLOBIN: 12.7 G/DL (ref 12–16)
HEMOGLOBIN: 12.9 G/DL (ref 12–16)
HEMOGLOBIN: 13.3 G/DL (ref 12–16)
HEMOGLOBIN: 13.4 G/DL (ref 12–16)
LYMPHOCYTES ABSOLUTE: 2.2 K/UL (ref 1–5.1)
LYMPHOCYTES RELATIVE PERCENT: 23 %
MAGNESIUM: 2.6 MG/DL (ref 1.8–2.4)
MCH RBC QN AUTO: 26.3 PG (ref 26–34)
MCH RBC QN AUTO: 26.3 PG (ref 26–34)
MCH RBC QN AUTO: 26.5 PG (ref 26–34)
MCH RBC QN AUTO: 26.9 PG (ref 26–34)
MCHC RBC AUTO-ENTMCNC: 32.9 G/DL (ref 31–36)
MCHC RBC AUTO-ENTMCNC: 33.1 G/DL (ref 31–36)
MCHC RBC AUTO-ENTMCNC: 33.5 G/DL (ref 31–36)
MCHC RBC AUTO-ENTMCNC: 33.6 G/DL (ref 31–36)
MCV RBC AUTO: 79 FL (ref 80–100)
MCV RBC AUTO: 79.5 FL (ref 80–100)
MCV RBC AUTO: 79.9 FL (ref 80–100)
MCV RBC AUTO: 80.1 FL (ref 80–100)
MONOCYTES ABSOLUTE: 0.8 K/UL (ref 0–1.3)
MONOCYTES RELATIVE PERCENT: 8.7 %
NEUTROPHILS ABSOLUTE: 5.9 K/UL (ref 1.7–7.7)
NEUTROPHILS RELATIVE PERCENT: 61.8 %
PDW BLD-RTO: 15.1 % (ref 12.4–15.4)
PDW BLD-RTO: 15.2 % (ref 12.4–15.4)
PDW BLD-RTO: 15.3 % (ref 12.4–15.4)
PDW BLD-RTO: 15.5 % (ref 12.4–15.4)
PLATELET # BLD: 196 K/UL (ref 135–450)
PLATELET # BLD: 197 K/UL (ref 135–450)
PLATELET # BLD: 206 K/UL (ref 135–450)
PLATELET # BLD: 227 K/UL (ref 135–450)
PMV BLD AUTO: 7.7 FL (ref 5–10.5)
PMV BLD AUTO: 7.9 FL (ref 5–10.5)
PMV BLD AUTO: 8 FL (ref 5–10.5)
PMV BLD AUTO: 8.2 FL (ref 5–10.5)
POTASSIUM REFLEX MAGNESIUM: 3.3 MMOL/L (ref 3.5–5.1)
RBC # BLD: 4.79 M/UL (ref 4–5.2)
RBC # BLD: 4.81 M/UL (ref 4–5.2)
RBC # BLD: 5.07 M/UL (ref 4–5.2)
RBC # BLD: 5.07 M/UL (ref 4–5.2)
SODIUM BLD-SCNC: 132 MMOL/L (ref 136–145)
WBC # BLD: 10.9 K/UL (ref 4–11)
WBC # BLD: 9.1 K/UL (ref 4–11)
WBC # BLD: 9.3 K/UL (ref 4–11)
WBC # BLD: 9.5 K/UL (ref 4–11)

## 2020-09-04 PROCEDURE — 85384 FIBRINOGEN ACTIVITY: CPT

## 2020-09-04 PROCEDURE — 6370000000 HC RX 637 (ALT 250 FOR IP): Performed by: INTERNAL MEDICINE

## 2020-09-04 PROCEDURE — 36415 COLL VENOUS BLD VENIPUNCTURE: CPT

## 2020-09-04 PROCEDURE — 99232 SBSQ HOSP IP/OBS MODERATE 35: CPT | Performed by: INTERNAL MEDICINE

## 2020-09-04 PROCEDURE — 83735 ASSAY OF MAGNESIUM: CPT

## 2020-09-04 PROCEDURE — 80048 BASIC METABOLIC PNL TOTAL CA: CPT

## 2020-09-04 PROCEDURE — 85027 COMPLETE CBC AUTOMATED: CPT

## 2020-09-04 PROCEDURE — 2580000003 HC RX 258: Performed by: SURGERY

## 2020-09-04 PROCEDURE — 85025 COMPLETE CBC W/AUTO DIFF WBC: CPT

## 2020-09-04 RX ADMIN — ASPIRIN 81 MG: 81 TABLET, COATED ORAL at 10:05

## 2020-09-04 RX ADMIN — Medication 10 ML: at 10:05

## 2020-09-04 RX ADMIN — Medication 1 LOZENGE: at 15:47

## 2020-09-04 RX ADMIN — Medication 1 LOZENGE: at 10:04

## 2020-09-04 RX ADMIN — APIXABAN 10 MG: 5 TABLET, FILM COATED ORAL at 10:05

## 2020-09-04 ASSESSMENT — PAIN SCALES - GENERAL
PAINLEVEL_OUTOF10: 0

## 2020-09-04 NOTE — PLAN OF CARE
Problem: Falls - Risk of:  Goal: Will remain free from falls  Description: Will remain free from falls  Outcome: Ongoing  Goal: Absence of physical injury  Description: Absence of physical injury  Outcome: Ongoing     Problem: SAFETY  Goal: Free from accidental physical injury  Outcome: Ongoing

## 2020-09-04 NOTE — PROGRESS NOTES
Pulmonary & Critical Care Medicine ICU Progress Note      Events of Last 24 hours: Patient been seen this morning positive intermittent coughing with throat congestion, patient does not have any increasing shortness of breath or wheezing, patient does not have any significant chest pain or palpitations, patient was not having any fever or chills, patient was afebrile and he medically maintained, patient was not having any respite distress or any increased work of breathing, patient was on room air oxygen with saturation of 93% when seen, patient was not having abdominal pain nausea vomiting or any altered bowel habits, patient was not having leg pain, patient has normal sinus rhythm on the monitor, patient's potassium control acceptable, patient was not having any other pertinent review of systems of concern      Vitals:  BP (!) 142/92   Pulse 91   Temp 97.9 °F (36.6 °C) (Oral)   Resp 19   Ht 5' 4\" (1.626 m)   Wt 204 lb 14.4 oz (92.9 kg)   SpO2 93%   BMI 35.17 kg/m²         Intake/Output Summary (Last 24 hours) at 9/4/2020 0905  Last data filed at 9/4/2020 0530  Gross per 24 hour   Intake 610 ml   Output 1200 ml   Net -590 ml       EXAM:  General: Very pleasant, obese. Well nourished, well developed, in no respiratory distress. Eyes:  No scleral icterus or periorbital edema. Head: Atraumatic, normocephalic. ENMT: Class III airway, missing teeth. No bleeding of lips or gums. Mucosa pink and moist. No ulceration. No oral candidiasis. Neck: Short and large neck JVD. Lymphadenopathy:  Deferred. CV: S1, S2, no murmurs, gallops, clicks or rubs. Pulses palpable and symmetrical.    Respiratory: Clear to auscultation  Chest: Equal rise and fall of chest.   GI:  Deferred. : Deferred. Skin: No rashes, lesions, bruises, induration, discharge. Color, texture, turgor normal.  Musculoskeletal: Supple, no contractures or muscle atrophy. No clubbing or cyanosis of nailbeds. No joint swelling, redness. Edema: None  Neuro: Detailed exam not performed, moves all extremities. Lines: Peripheral       Medications:  Scheduled Meds:   benzocaine-menthol  1 lozenge Oral TID    apixaban  10 mg Oral BID    Followed by   Salas Regalado ON 9/10/2020] apixaban  5 mg Oral BID    sodium chloride flush  10 mL Intravenous 2 times per day    aspirin  81 mg Oral Daily    potassium chloride  20 mEq Oral Once       PRN Meds:  sodium chloride flush, acetaminophen, ondansetron, polyethylene glycol, promethazine **OR** [DISCONTINUED] ondansetron    Results:  CBC:   Recent Labs     09/04/20  0219 09/04/20  0544 09/04/20  0732   WBC 9.1 9.5 9.3   HGB 12.7 12.9 13.4   HCT 38.2 38.3 40.0   MCV 79.5* 79.9* 79.0*    197 206     BMP:   Recent Labs     09/02/20  0347 09/02/20  2209 09/03/20  0425 09/04/20  0544   *  --  137 132*   K 3.1* 3.6 3.8 3.3*   CL 97*  --  102 95*   CO2 24  --  23 23   BUN 15  --  18 16   CREATININE 0.8  --  0.7 0.7     LIVER PROFILE:   Recent Labs     09/01/20  1511   AST 18   ALT 12   BILITOT 0.6   ALKPHOS 92     PT/INR:   Recent Labs     09/01/20  1511   PROTIME 13.1   INR 1.13     APTT:   Recent Labs     09/02/20  1507 09/02/20  2210 09/03/20  0425   APTT 66.1* 36.0 55.6*     UA:  Recent Labs     09/01/20  1755   COLORU Yellow   PHUR 6.0   WBCUA 3-5   RBCUA 11-20*   BACTERIA 1+*   CLARITYU Clear   SPECGRAV 1.015   LEUKOCYTESUR SMALL*   UROBILINOGEN 0.2   BILIRUBINUR Negative   BLOODU TRACE-INTACT*   GLUCOSEU Negative   AMORPHOUS 1+       Results for Sang Almendarez (MRN [id number]) as of 9/4/2020 09:08   Ref.  Range 9/3/2020 13:26 9/3/2020 20:04 9/4/2020 02:19 9/4/2020 05:44 9/4/2020 07:32   WBC Latest Ref Range: 4.0 - 11.0 K/uL 9.4 13.2 (H) 9.1 9.5 9.3   RBC Latest Ref Range: 4.00 - 5.20 M/uL 5.51 (H) 5.42 (H) 4.81 4.79 5.07   Hemoglobin Quant Latest Ref Range: 12.0 - 16.0 g/dL 14.5 14.1 12.7 12.9 13.4   Hematocrit Latest Ref Range: 36.0 - 48.0 % 43.7 43.0 38.2 38.3 40.0   MCV Latest Ref Range: 80.0 - 100.0 fL 79.2 (L) 79.4 (L) 79.5 (L) 79.9 (L) 79.0 (L)   MCH Latest Ref Range: 26.0 - 34.0 pg 26.3 26.0 26.3 26.9 26.5   MCHC Latest Ref Range: 31.0 - 36.0 g/dL 33.2 32.7 33.1 33.6 33.5   MPV Latest Ref Range: 5.0 - 10.5 fL 7.7 7.9 7.7 7.9 8.2   RDW Latest Ref Range: 12.4 - 15.4 % 15.2 15.5 (H) 15.1 15.2 15.3   Platelet Count Latest Ref Range: 135 - 450 K/uL 199 238 196 197 206   Neutrophils % Latest Units: %    61.8    Lymphocyte % Latest Units: %    23.0      Results for Robbin Vines (MRN [id number]) as of 9/4/2020 09:08   Ref. Range 9/2/2020 03:47 9/2/2020 08:26 9/2/2020 22:09 9/3/2020 04:25 9/4/2020 05:44   Sodium Latest Ref Range: 136 - 145 mmol/L 135 (L)   137 132 (L)   Potassium Latest Ref Range: 3.5 - 5.1 mmol/L 3.1 (L)  3.6 3.8 3.3 (L)   Chloride Latest Ref Range: 99 - 110 mmol/L 97 (L)   102 95 (L)   CO2 Latest Ref Range: 21 - 32 mmol/L 24   23 23   BUN Latest Ref Range: 7 - 20 mg/dL 15   18 16   Creatinine Latest Ref Range: 0.6 - 1.2 mg/dL 0.8   0.7 0.7   Anion Gap Latest Ref Range: 3 - 16  14   12 14   GFR Non- Latest Ref Range: >60  >60   >60 >60   GFR  Latest Ref Range: >60  >60   >60 >60   Magnesium Latest Ref Range: 1.80 - 2.40 mg/dL 2.30    2.60 (H)   Glucose Latest Ref Range: 70 - 99 mg/dL 117 (H)   111 (H) 91   Calcium Latest Ref Range: 8.3 - 10.6 mg/dL 9.1   8.5 8.5   Troponin Latest Ref Range: <0.01 ng/mL 0.02 (H) 0.02 (H)        ECHO- Summary   Technically difficult examination. Left ventricular systolic function is hyperdynamic with ejection fraction   estimated at >65%. There is diastolic septal flattening consistent with right ventricular   volume overload. There is mild concentric left ventricular hypertrophy. Grade I diastolic dysfunction with normal left ventricular filling pressure. The right ventricle is severely enlarged. Stinson''s sign is noted. Right ventricular lateral wall is akinetic. Right ventricular apex is spared.    The right

## 2020-09-04 NOTE — TELEPHONE ENCOUNTER
----- Message from Ila Duque MD sent at 9/4/2020  9:37 AM EDT -----  Follow-up with Dr. Da Chopra in 3 months time  Patient will be needing a CT chest with contrast prior to the visit for acute pulmonary embolism

## 2020-09-04 NOTE — DISCHARGE SUMMARY
Hospital Medicine Discharge Summary    Patient: Naila Khan     Age: 70 y.o. Gender: female  : 1945   MRN: [id number]  Code Status: Full     Admit Date: 2020   Discharge Date: 2020    Disposition:  Home     Condition at Discharge: Stable    Primary Care Provider: Marilou London MD    Admitting Physician: Leroy Starr MD  Discharge Physician: Bebeto Guerra MD       Discharge Diagnoses: Active Hospital Problems    Diagnosis    Pulmonary HTN ([facility] [address] [I27.20]    Acute deep vein thrombosis (DVT) of femoral vein of left lower extremity (HCC) [I82.412]    Acute saddle pulmonary embolism with acute cor pulmonale (HCC) [I26.02]    Elevated brain natriuretic peptide (BNP) level [R79.89]    Elevated troponin [R79.89]    History of pulmonary embolism [Z86.711]    Pulmonary embolism, bilateral (HCC) [I26.99]    Essential hypertension [I10]    Obesity (BMI 35.0-39.9 without comorbidity) [E66.9]       Hospital Course:    70 y.o. female who presented to [facility] with sob. Pt noted sudden onset on Saturday. No travel but does note falling once approx 1.5 weeks ago(she lives at independent living facility) and bruising her right knee. She has NOT been bedbound since. This weekend, she noted progressively worsening RILEY. No cp/LOC. No f/chills, no cough, no n/v/diarrhea. She notes prior DVT(unclear etiology) around (came to [facility]) and was tx'd for approx 4 months. She denies hx of cancer and is she states that she is uptodate on pap/mamogram/cscope. Assessment/Plan:    Acute Pulmonary Embolism: With extensive clot burden. Vascular Surgery consulted, completed EKOS. Transition back to heparin drip and subsequently started on Eliquis. She will require life long AC. LE doppler does demonstrate a left femoral DVT. She has no significant lower extremity edema. Counseled on her extremity compression stockings for any edema.       HTN-stable  Resume home regimen aside from HCTZ    Hypokalemia- mild  Replaced      Exam:   BP (!) 161/81   Pulse 90   Temp 98.4 °F (36.9 °C) (Oral)   Resp 16   Ht 5' 4\" (1.626 m)   Wt 204 lb 14.4 oz (92.9 kg)   SpO2 94%   BMI 35.17 kg/m²   General appearance:  No apparent distress, appears stated age and cooperative. HEENT:  Normal cephalic, atraumatic without obvious deformity. Pupils equal, round, and reactive to light. Extra ocular muscles intact. Conjunctivae/corneas clear. Neck: Supple, with full range of motion. No jugular venous distention. Trachea midline. Respiratory:  Normal respiratory effort. Clear to auscultation, bilaterally without Rales/Wheezes/Rhonchi. Cardiovascular:  Regular rate and rhythm with normal S1/S2 without murmurs, rubs or gallops. Abdomen: Soft, non-tender, non-distended with normal bowel sounds. Musculoskeletal:  No clubbing, cyanosis or edema bilaterally. Full range of motion without deformity. Skin: Skin color, texture, turgor normal.  No rashes or lesions. Neurologic:  Neurovascularly intact without any focal sensory/motor deficits. Cranial nerves: II-XII intact, grossly non-focal.  Psychiatric:  Alert and oriented, thought content appropriate, normal insight  Capillary Refill: Brisk,< 3 seconds   Peripheral Pulses: +2 palpable, equal bilaterally     Patient Discharge Instructions: Follow up:  1. Primary Care Provider Gavi Murrell MD in the next 1-2 weeks.       Discharge Medications:   Discharge Medication List as of 9/4/2020  4:01 PM      START taking these medications    Details   apixaban (ELIQUIS DVT/PE STARTER PACK) 5 MG TABS tablet Take 10 mg (2 tablets) orally twice daily for 6 days, then take 5 mg (1 tablet) orally twice daily thereafter., Disp-74 tablet,R-0Normal           Discharge Medication List as of 9/4/2020  4:01 PM        Discharge Medication List as of 9/4/2020  4:01 PM      CONTINUE these medications which have NOT CHANGED    Details   labetalol (NORMODYNE) 100 MG tablet TAKE 1 TABLET TWICE DAILY, Disp-180 tablet,R-1Normal      amLODIPine (NORVASC) 10 MG tablet Take 1 tablet by mouth daily, Disp-90 tablet, R-3Normal      aspirin 81 MG tablet Take 1 tablet by mouth daily, Disp-90 tablet, R-3Normal           Discharge Medication List as of 9/4/2020  4:01 PM      STOP taking these medications       hydrochlorothiazide (MICROZIDE) 12.5 MG capsule Comments:   Reason for Stopping:                 Significant Test Results    Xr Chest Portable    Result Date: 9/1/2020  EXAMINATION: ONE XRAY VIEW OF THE CHEST 9/1/2020 2:49 pm COMPARISON: Prior study(s) most recent 04/06/2015. HISTORY: ORDERING SYSTEM PROVIDED HISTORY: Shortness of breath TECHNOLOGIST PROVIDED HISTORY: Reason for exam:->Shortness of breath Reason for Exam: Shortness of breath Acuity: Acute Type of Exam: Initial FINDINGS: The heart is mildly enlarged. Pulmonary vessels are upper normal.  Again the ascending aorta is demarcated overlying the right hilum likely from ascending aortic dilation. This is fairly similar to the prior study and was noted previously as well. No acute airspace disease. Mild cardiomegaly and borderline vascular congestion. Ct Chest Pulmonary Embolism W Contrast    Result Date: 9/1/2020  EXAMINATION: CTA OF THE CHEST 9/1/2020 4:05 pm TECHNIQUE: CTA of the chest was performed after the administration of intravenous contrast.  Multiplanar reformatted images are provided for review. MIP images are provided for review. Dose modulation, iterative reconstruction, and/or weight based adjustment of the mA/kV was utilized to reduce the radiation dose to as low as reasonably achievable. COMPARISON: None.  HISTORY: ORDERING SYSTEM PROVIDED HISTORY: Shortness of breath 48 hours with history of PE, not on anticoagulant, no history of COPD O2 saturation 92% TECHNOLOGIST PROVIDED HISTORY: Reason for exam:->Shortness of breath 48 hours with history of PE, not on anticoagulant, no history of COPD O2 saturation 92% Mary Chapman MD  Procedure Type of Study:   Veins:Lower Extremities DVT Study, VASC EXTREMITY VENOUS DUPLEX BILATERAL. Vascular Sonographer Report  Additional Indications:Patient positive for pulmonary embolism with a history of deep vein thrombosis. Venous Duplex Scan: B-mode imaging of the deep and superficial veins, with compression maneuvers, including color and Doppler spectral waveform analysis. Impressions Right Impression There is no evidence of deep or superficial venous thrombosis involving the right lower extremity. Left Impression There is acute totally occluding deep venous thrombosis involving the left proximal femoral artery to the distal femoral artery. Previous exam on 11/11/2015. Conclusions   Summary   No evidence of deep vein or superficial thrombosis involving the right lower  extremity. There is acute totally occluding deep vein thrombosis demonstrated in the  left proximal femoral vein to the distal femoral vein. Signature   ------------------------------------------------------------------  Electronically signed by Chadd Eisenberg MD (Interpreting  physician) on 09/04/2020 at 08:42 AM  ------------------------------------------------------------------  Patient Status:Routine. Study [facility] [facility] - Vascular Lab. Technical Quality:Limited visualization due to dressings.   - Results were reported to:Results given to Nurse Buck Ku @ 8:35 am on     09/03/20. Risk Factors History +---------+----------+-------------------------------------+ ! Diagnosis! Date      ! Comments                             ! +---------+----------+-------------------------------------+ ! Other    !11/11/2015! History of right popliteal DVT 4/7/15! !         ! !No symptoms at this time             !  +---------+----------+-------------------------------------+ Velocities are measured in cm/s ; Diameters are measured in mm Right Lower Extremities DVT Study Measurements Right 2D Measurements +------------------------+----------+---------------+----------+ ! Location                ! Visualized! Compressibility! Thrombosis! +------------------------+----------+---------------+----------+ ! Sapheno Femoral Junction! Yes       ! Yes            ! None      ! +------------------------+----------+---------------+----------+ ! GSV Thigh               ! Yes       ! Yes            ! None      ! +------------------------+----------+---------------+----------+ ! Common Femoral          !Yes       ! Yes            ! None      ! +------------------------+----------+---------------+----------+ ! Femoral                 !Yes       ! Yes            ! None      ! +------------------------+----------+---------------+----------+ ! Prox Femoral            !Yes       ! Yes            ! None      ! +------------------------+----------+---------------+----------+ ! Mid Femoral             !Yes       ! Yes            ! None      ! +------------------------+----------+---------------+----------+ ! Dist Femoral            !Yes       ! Yes            ! None      ! +------------------------+----------+---------------+----------+ ! Deep Femoral            !Yes       ! Yes            ! None      ! +------------------------+----------+---------------+----------+ ! Popliteal               !Yes       ! Yes            ! None      ! +------------------------+----------+---------------+----------+ ! GSV Below Knee          ! Yes       ! Yes            ! None      ! +------------------------+----------+---------------+----------+ ! Gastroc                 ! Yes       ! Yes            ! None      ! +------------------------+----------+---------------+----------+ ! Soleal                  !Yes       ! Yes            ! None      ! +------------------------+----------+---------------+----------+ ! PTV                     ! Yes       ! Yes            ! None      ! +------------------------+----------+---------------+----------+ ! Stephen                !Yes       ! Yes !None      ! +------------------------+----------+---------------+----------+ ! GSV Calf                ! Yes       ! Yes            ! None      ! +------------------------+----------+---------------+----------+ ! SSV                     ! Yes       ! Yes            ! None      ! +------------------------+----------+---------------+----------+ Right Doppler Measurements +------------------------+------+------+------------+ ! Location                ! Signal!Reflux! Reflux (sec)! +------------------------+------+------+------------+ ! Sapheno Femoral Junction! Phasic! No    !            ! +------------------------+------+------+------------+ ! Common Femoral          !Phasic! No    !            ! +------------------------+------+------+------------+ ! Femoral                 !Phasic! No    !            ! +------------------------+------+------+------------+ ! Deep Femoral            !Phasic! No    !            ! +------------------------+------+------+------------+ ! Popliteal               !Phasic! No    !            ! +------------------------+------+------+------------+ Left Lower Extremities DVT Study Measurements Left 2D Measurements +------------------------+----------+---------------+----------+ ! Location                ! Visualized! Compressibility! Thrombosis! +------------------------+----------+---------------+----------+ ! Sapheno Femoral Junction! Yes       ! Yes            ! None      ! +------------------------+----------+---------------+----------+ ! GSV Thigh               ! Yes       ! Yes            ! None      ! +------------------------+----------+---------------+----------+ ! Common Femoral          !Yes       ! Yes            ! None      ! +------------------------+----------+---------------+----------+ ! Femoral                 !Yes       ! Yes            ! None      ! +------------------------+----------+---------------+----------+ ! Prox Femoral            !Yes       ! No             !Acute     ! ! +------------------------+------+------+------------+ ! Deep Femoral            !Phasic! No    !            ! +------------------------+------+------+------------+ ! Popliteal               !Phasic! No    !            ! +------------------------+------+------+------------+        Consults:     IP CONSULT TO HOSPITALIST  IP CONSULT TO VASCULAR SURGERY  IP CONSULT TO PULMONOLOGY    Labs: For convenience and continuity at follow-up the following most recent labs are provided:    Lab Results   Component Value Date    WBC 10.9 09/04/2020    HGB 13.3 09/04/2020    HCT 40.6 09/04/2020    MCV 80.1 09/04/2020     09/04/2020     09/04/2020    K 3.3 09/04/2020    CL 95 09/04/2020    CO2 23 09/04/2020    BUN 16 09/04/2020    CREATININE 0.7 09/04/2020    CALCIUM 8.5 09/04/2020    PHOS 3.5 04/11/2019    TROPONINI 0.02 09/02/2020    ALKPHOS 92 09/01/2020    ALT 12 09/01/2020    AST 18 09/01/2020    BILITOT 0.6 09/01/2020    BILIDIR <0.2 06/15/2020    LABALBU 4.1 09/01/2020    LDLCALC 118 06/15/2020    TRIG 90 06/15/2020     Lab Results   Component Value Date    INR 1.13 09/01/2020    INR 1.09 04/06/2015         The patient was seen and examined on day of discharge and this discharge summary is in conjunction with any daily progress note from day of discharge. Time spent on discharge is more than 30 minutes in the examination, evaluation, counseling and review of medications and discharge plan. Signed:    Fidelina Mason MD   9/20/2020    Thank you Rajinder Riddle MD for the opportunity to be involved in this patient's care. If you have any questions or concerns please feel free to contact my office [phone].

## 2020-09-04 NOTE — PROGRESS NOTES
Reviewed discharge instructions with patient, verbalized understanding. Also gave printed material on DVTs, PEs and eliquis. No additional questions at this time. IV removed with no complications. Telemetry monitor removed and returned, [facility] notified of discharge. Patient given taken to outpatient pharmacy to  prescriptions. Belongings gathered and lockbox emptied. Patient escorted out via wheelchair.

## 2020-09-05 ENCOUNTER — CARE COORDINATION (OUTPATIENT)
Dept: CASE MANAGEMENT | Age: 75
End: 2020-09-05

## 2020-09-05 NOTE — CARE COORDINATION
Date/Time:  9/5/2020 10:42 AM  Attempted to reach patient by telephone. Left HIPPA compliant message requesting a return call. Will attempt to reach patient again.     Thank Nydia Clemente RN  Care Transition Coordinator  Contact JEWEL:[phone]

## 2020-09-08 ENCOUNTER — CARE COORDINATION (OUTPATIENT)
Dept: CASE MANAGEMENT | Age: 75
End: 2020-09-08

## 2020-09-08 NOTE — CARE COORDINATION
COVID-19 Monitoring Call:    Second and final attempt made to reach patient for initial post hospital transition call. Received message caller unable to accept call at this time.        Deborah Nichols BSN, RN, [facility]  Care Transition Nurse   [phone]

## 2020-09-10 ENCOUNTER — TELEPHONE (OUTPATIENT)
Dept: PULMONOLOGY | Age: 75
End: 2020-09-10

## 2020-09-10 NOTE — TELEPHONE ENCOUNTER
Dr. Norwood Manner please sign CT order I have avtar. Per your message. It. Is scheduled for 12/20. With FU to Dr. Jagdish Smalls per your instruction.

## 2020-10-01 ENCOUNTER — OFFICE VISIT (OUTPATIENT)
Dept: FAMILY MEDICINE CLINIC | Age: 75
End: 2020-10-01
Payer: MEDICARE

## 2020-10-01 VITALS
HEART RATE: 75 BPM | RESPIRATION RATE: 16 BRPM | TEMPERATURE: 98 F | SYSTOLIC BLOOD PRESSURE: 126 MMHG | OXYGEN SATURATION: 99 % | BODY MASS INDEX: 34.35 KG/M2 | WEIGHT: 201.2 LBS | HEIGHT: 64 IN | DIASTOLIC BLOOD PRESSURE: 70 MMHG

## 2020-10-01 PROBLEM — E66.01 MORBIDLY OBESE (HCC): Status: ACTIVE | Noted: 2020-10-01

## 2020-10-01 PROCEDURE — G8399 PT W/DXA RESULTS DOCUMENT: HCPCS | Performed by: INTERNAL MEDICINE

## 2020-10-01 PROCEDURE — 1036F TOBACCO NON-USER: CPT | Performed by: INTERNAL MEDICINE

## 2020-10-01 PROCEDURE — G8417 CALC BMI ABV UP PARAM F/U: HCPCS | Performed by: INTERNAL MEDICINE

## 2020-10-01 PROCEDURE — G0008 ADMIN INFLUENZA VIRUS VAC: HCPCS | Performed by: INTERNAL MEDICINE

## 2020-10-01 PROCEDURE — G8427 DOCREV CUR MEDS BY ELIG CLIN: HCPCS | Performed by: INTERNAL MEDICINE

## 2020-10-01 PROCEDURE — 1123F ACP DISCUSS/DSCN MKR DOCD: CPT | Performed by: INTERNAL MEDICINE

## 2020-10-01 PROCEDURE — 3017F COLORECTAL CA SCREEN DOC REV: CPT | Performed by: INTERNAL MEDICINE

## 2020-10-01 PROCEDURE — 4040F PNEUMOC VAC/ADMIN/RCVD: CPT | Performed by: INTERNAL MEDICINE

## 2020-10-01 PROCEDURE — 90694 VACC AIIV4 NO PRSRV 0.5ML IM: CPT | Performed by: INTERNAL MEDICINE

## 2020-10-01 PROCEDURE — 99213 OFFICE O/P EST LOW 20 MIN: CPT | Performed by: INTERNAL MEDICINE

## 2020-10-01 PROCEDURE — 1090F PRES/ABSN URINE INCON ASSESS: CPT | Performed by: INTERNAL MEDICINE

## 2020-10-01 PROCEDURE — G8484 FLU IMMUNIZE NO ADMIN: HCPCS | Performed by: INTERNAL MEDICINE

## 2020-10-01 PROCEDURE — 1111F DSCHRG MED/CURRENT MED MERGE: CPT | Performed by: INTERNAL MEDICINE

## 2020-10-01 ASSESSMENT — ENCOUNTER SYMPTOMS: SHORTNESS OF BREATH: 1

## 2020-10-01 NOTE — PROGRESS NOTES
10/1/2020    Chayito Diego (:  1945) is a 70 y.o. female, here for evaluation of the following medical concerns:    HPI  PE (pulmonary thromboembolism) ([address]  Heme w/u showed no reason to continue Xarelto and was stopped in 2016. On Asa 81 mg w/ no sign of DVT. No c/o of increased SOB or CP until the day prior to admission to hospital 2020. Found to have PE.  DVT was then found L leg by doppler. Review of Systems   Constitutional: Negative. Respiratory: Positive for shortness of breath. Cardiovascular: Negative. Endocrine: Negative. Genitourinary: Negative. Musculoskeletal: Positive for arthralgias, joint swelling and myalgias. Skin: Negative. Neurological: Negative. Prior to Visit Medications    Medication Sig Taking? Authorizing Provider   apixaban (ELIQUIS) 5 MG TABS tablet Take 1 tablet by mouth 2 times daily  C Noman Tirvedi MD   apixaban (ELIQUIS DVT/PE STARTER PACK) 5 MG TABS tablet Take 10 mg (2 tablets) orally twice daily for 6 days, then take 5 mg (1 tablet) orally twice daily thereafter. Elizabeth Sparks MD   labetalol (NORMODYNE) 100 MG tablet TAKE 1 TABLET TWICE DAILY  TAYLOR Trivedi MD   amLODIPine (NORVASC) 10 MG tablet Take 1 tablet by mouth daily  TAYLOR Trivedi MD   aspirin 81 MG tablet Take 1 tablet by mouth daily  Ambrocio Pritchett MD        No Known Allergies    Past Medical History:   Diagnosis Date    Hx of blood clots     Hypertension        Past Surgical History:   Procedure Laterality Date    COLONOSCOPY      COLONOSCOPY  16    colon polyp    EYE SURGERY      cataracts bilateral    HYSTERECTOMY         Social History     Socioeconomic History    Marital status:       Spouse name: Not on file    Number of children: Not on file    Years of education: Not on file    Highest education level: Not on file   Occupational History    Not on file   Social Needs    Financial resource strain: Not on file   [employer] insecurity     Worry: Not on file     Inability: Not on file    Transportation needs     Medical: Not on file     Non-medical: Not on file   Tobacco Use    Smoking status: Never Smoker    Smokeless tobacco: Never Used   Substance and Sexual Activity    Alcohol use: Yes     Alcohol/week: 0.0 standard drinks     Comment: rarely    Drug use: No    Sexual activity: Not on file   Lifestyle    Physical activity     Days per week: Not on file     Minutes per session: Not on file    Stress: Not on file   Relationships    Social connections     Talks on phone: Not on file     Gets together: Not on file     Attends Che[employer] service: Not on file     Active member of club or organization: Not on file     Attends meetings of clubs or organizations: Not on file     Relationship status: Not on file    Intimate partner violence     Fear of current or ex partner: Not on file     Emotionally abused: Not on file     Physically abused: Not on file     Forced sexual activity: Not on file   Other Topics Concern    Not on file   Social History Narrative    Not on file        Family History   Problem Relation Age of Onset    Cancer Mother     Breast Cancer Mother     Coronary Art Dis Father        Vitals:    10/01/20 1444   BP: 126/70   Pulse: 75   Resp: 16   Temp: 98 °F (36.7 °C)   SpO2: 99%   Weight: 201 lb 3.2 oz (91.3 kg)   Height: 5' 4\" (1.626 m)     Estimated body mass index is 34.54 kg/m² as calculated from the following:    Height as of this encounter: 5' 4\" (1.626 m). Weight as of this encounter: 201 lb 3.2 oz (91.3 kg). Physical Exam  Constitutional:       General: She is not in acute distress. Appearance: She is well-developed. She is obese. She is not ill-appearing, toxic-appearing or diaphoretic. HENT:      Head: Normocephalic and atraumatic. Eyes:      Conjunctiva/sclera: Conjunctivae normal.      Pupils: Pupils are equal, round, and reactive to light.    Neck:      Musculoskeletal: Full passive range of motion without pain, normal range of motion and neck supple. Normal range of motion. No edema, erythema, spinous process tenderness or muscular tenderness. Thyroid: No thyroid mass or thyromegaly. Vascular: Normal carotid pulses. No carotid bruit, hepatojugular reflux or JVD. Trachea: Trachea normal. No tracheal deviation. Cardiovascular:      Rate and Rhythm: Normal rate and regular rhythm. No extrasystoles are present. Chest Wall: PMI is not displaced. Pulses: Normal pulses. No decreased pulses. Carotid pulses are 2+ on the right side and 2+ on the left side. Radial pulses are 2+ on the right side and 2+ on the left side. Femoral pulses are 2+ on the right side and 2+ on the left side. Popliteal pulses are 2+ on the right side and 2+ on the left side. Dorsalis pedis pulses are 2+ on the right side and 2+ on the left side. Posterior tibial pulses are 2+ on the right side and 2+ on the left side. Heart sounds: Normal heart sounds, S1 normal and S2 normal. Heart sounds not distant. No murmur. No friction rub. No gallop. No S3 or S4 sounds. Pulmonary:      Effort: Pulmonary effort is normal. No tachypnea, bradypnea, accessory muscle usage or respiratory distress. Breath sounds: Normal breath sounds. No decreased breath sounds, wheezing, rhonchi or rales. Chest:      Chest wall: No tenderness. Musculoskeletal: Normal range of motion. General: No swelling or tenderness. Right lower leg: No edema. Left lower leg: No edema. Lymphadenopathy:      Cervical: No cervical adenopathy. Skin:     General: Skin is warm and dry. Coloration: Skin is not pale. Findings: No abrasion, erythema or rash. Nails: There is no clubbing. Neurological:      Mental Status: She is alert and oriented to person, place, and time. She is not disoriented. Cranial Nerves: No cranial nerve deficit.       Sensory: No sensory deficit. Motor: No tremor, atrophy or abnormal muscle tone. Coordination: Coordination normal.      Deep Tendon Reflexes: Reflexes are normal and symmetric. Psychiatric:         Speech: Speech normal.         Behavior: Behavior normal.         Thought Content: Thought content normal.         Judgment: Judgment normal.         ASSESSMENT/PLAN:  1. PE (pulmonary thromboembolism) ([facility] [address]  2nd DVT and now PE. Apixaban for life. 2. Non morbidly obese ([facility] [address]  To improve diet and lose weight. RTSM in 3 mo. An  electronic signature was used to authenticate this note.     --Trenton Christine MD on 10/1/2020 at 3:10 PM

## 2020-10-01 NOTE — PROGRESS NOTES
Vaccine Information Sheet, \"Influenza - Inactivated\"  given to Brendan Innocent, or parent/legal guardian of  Brendan Innocent and verbalized understanding. Patient responses:    Have you ever had a reaction to a flu vaccine? No  Do you have any current illness? No  Have you ever had Guillian Newport Syndrome? No  Do you have a serious allergy to any of the follow: Neomycin, Polymyxin, Thimerosal, eggs or egg products? No    Flu vaccine given per order. Please see immunization tab. Risks and benefits explained. Current VIS given.

## 2020-10-01 NOTE — PATIENT INSTRUCTIONS
ASSESSMENT/PLAN:  1. PE (pulmonary thromboembolism) ([address]  2nd DVT and now PE. Apixaban for life. 2. Non morbidly obese ([address]  To improve diet and lose weight. RTSM in 3 mo. An  electronic signature was used to authenticate this note.     --Zoran Zabala MD on 10/1/2020 at 3:10 PM

## 2020-10-15 RX ORDER — AMLODIPINE BESYLATE 10 MG/1
TABLET ORAL
Qty: 90 TABLET | Refills: 3 | Status: SHIPPED | OUTPATIENT
Start: 2020-10-15 | End: 2021-08-24

## 2020-11-05 ENCOUNTER — TELEPHONE (OUTPATIENT)
Dept: FAMILY MEDICINE CLINIC | Age: 75
End: 2020-11-05

## 2020-11-05 NOTE — TELEPHONE ENCOUNTER
S/w pt states her bps have been ok  But never stopped taking med sbp 120-130 dbp ??? ( does not remember)     Advised pt to keep track of home bp readings and call if higher than 130/90

## 2020-11-05 NOTE — TELEPHONE ENCOUNTER
Patient called pharmacy to refill the Hydrochlorothiazide medication. She was told it was discontinued. She would like to know if she is no longer supposed to be taking?

## 2020-12-07 ENCOUNTER — TELEPHONE (OUTPATIENT)
Dept: PULMONOLOGY | Age: 75
End: 2020-12-07

## 2020-12-07 NOTE — TELEPHONE ENCOUNTER
Can you please put in new CT order for the patient with dx PE. Current order says nodules but pt does not have history of previous nodules on any imaging.

## 2020-12-08 ENCOUNTER — HOSPITAL ENCOUNTER (OUTPATIENT)
Dept: CT IMAGING | Age: 75
Discharge: HOME | End: 2020-12-08
Payer: MEDICARE

## 2020-12-08 PROCEDURE — 71250 CT THORAX DX C-: CPT

## 2020-12-10 ENCOUNTER — VIRTUAL VISIT (OUTPATIENT)
Dept: PULMONOLOGY | Age: 75
End: 2020-12-10
Payer: MEDICARE

## 2020-12-10 PROCEDURE — 99442 PR PHYS/QHP TELEPHONE EVALUATION 11-20 MIN: CPT | Performed by: INTERNAL MEDICINE

## 2020-12-10 NOTE — PROGRESS NOTES
Note: not billable if this call serves to triage the patient into an appointment for the relevant concern      Donaldo Shonnas         Orders Placed This Encounter   Procedures    CT CHEST PULMONARY EMBOLISM W CONTRAST    BUN    CREATININE, SERUM    ECHO 2D WO Color Doppler Complete       Return in about 3 months (around 3/2/2021). Chief Complaint:   Follow-up (3 mo) and Results (CT)       HPI: Siena Suarez is a 70y.o. year old female here for hospital follow-up. Her PCP is Dr. Blas Zamorano. Jeremy Ambriz is a very pleasant 65-year-old female who was seen in September for bilateral submassive pulmonary embolism with elevated troponin and proBNP. She also had an acute DVT in the left proximal to distal femoral vein. She had a past history of essential hypertension, prior DVT. She had hyponatremia, elevated blood glucose, leukocytosis. She underwent catheter directed thrombolysis on 9/2/2020. She was discharged on Eliquis. The patient has been doing extremely well, she denies shortness of breath, cough, wheezing, chest pain. She denies leg edema. Unfortunately CT chest was performed without contrast.  She is aware that she will need Eliquis lifelong. There was no other change in her medical or surgical history, medications were reviewed. The rest of her ROS was negative. CT chest 12/8/20  Stable tiny pulmonary nodules         Lytic and sclerotic focus in T5 of uncertain significance. .  Consider bone    scan for further characterization.       Echocardiogram 9/1/2020 Left ventricular systolic function is hyperdynamic with ejection fraction estimated at >65%. There is diastolic septal flattening consistent with right ventricular volume overload. There is mild concentric left ventricular hypertrophy. Grade I diastolic dysfunction with normal left ventricular filling pressure. The right ventricle is severely enlarged. Stinson''s sign is noted. Right ventricular lateral wall is akinetic. Right ventricular apex is spared. The right atrium is mildly dilated. Moderate tricuspid regurgitation. Systolic pulmonary artery pressure (SPAP) is estimated at 70 mmHg consistent with severe pulmonary hypertension (Right atrial pressure of 8 mmHg). Findings: The xenon ventilation study demonstrates normal single    breath images. There is near-complete loss of xenon during the    rebreathing phase.         The perfusion study demonstrates moderately decreased perfusion in    both upper lobes. There is mildly decreased perfusion in the    superior segment of the left lower lobe. There are small to    moderate perfusion defects in the middle lobe involving both the    medial and lateral segments.       Previous notes reviewed and edited as necessary  Evaluation was requested by Dr. Johnny Putnam regarding shortness of breath, bilateral PE.    HPI: Sarah Stearns is a very pleasant 70 y.o. female who presented to the ER today with shortness of breath. The patient has minimal medical problems, is on treatment for hypertension, had a prior history of DVT in the right popliteal vein in April 2015, it had resolved on follow-up study on 11/11/2015. She was on anticoagulation, was treated for several months. She does not remember details. She does not remember details about pulmonary embolism, but had a high probability VQ scan on t 4/6/2015. It appears this episode of thromboembolism was unprovoked. She had mild elevation of BUN and creatinine, felt to be possibly due to diuretic and ACE inhibitor combination. The patient has been a lifelong non-smoker, rarely drinks alcohol. She is a  and has no children. 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 She lives in an independent living facility, had a fall few days ago and developed an injury to her right knee. She claims she was not in active, continue to walk. On Saturday she noticed shortness of breath, not necessarily related to exertion. She is unable to give any accurate history about her shortness of breath, denies any chest pain or syncopal episodes. She denied any leg swelling. Symptoms continued through the weekend, today she was trying to take someone else for shopping, noted significant shortness of breath, possibly worsening over the last 2 days. She therefore presented to the ER. The patient has no history of cancer, has undergone colonoscopy in the past.  She denies weight loss. She denies symptoms of sleep apnea. She denies weight loss.   The rest of her review of systems was unremarkable.    The patient was evaluated in the ER, underwent CT chest with PE protocol, lab work. This showed extensive clot burden in the main pulmonary arteries bilaterally, elevated BNP and troponin. Our service was therefore consulted by Dr. Kathy Luciano.       Past Medical History:   Diagnosis Date    Hx of blood clots 2015    Hypertension        Past Surgical History:   Procedure Laterality Date    COLONOSCOPY  2006    COLONOSCOPY  4/19/16    colon polyp    EYE SURGERY      cataracts bilateral    HYSTERECTOMY         Social History     Tobacco Use    Smoking status: Never Smoker    Smokeless tobacco: Never Used   Substance Use Topics    Alcohol use: Yes     Alcohol/week: 0.0 standard drinks     Comment: rarely       Family History   Problem Relation Age of Onset    Cancer Mother     Breast Cancer Mother     Coronary Art Dis Father          Current Outpatient Medications:     amLODIPine (NORVASC) 10 MG tablet, TAKE 1 TABLET EVERY DAY, Disp: 90 tablet, Rfl: 3    apixaban (ELIQUIS) 5 MG TABS tablet, Take 1 tablet by mouth 2 times daily, Disp: 180 tablet, Rfl: 1    labetalol (NORMODYNE) 100 MG tablet, TAKE 1 TABLET TWICE DAILY, Disp: 180 tablet, Rfl: 1    aspirin 81 MG tablet, Take 1 tablet by mouth daily, Disp: 90 tablet, Rfl: 3    Patient has no known allergies. There were no vitals filed for this visit. Review of Systems   All other systems reviewed and are negative. Physical Exam  Physical exam could not be performed as this was a virtual visit via telephone. The patient was very pleasant, awake, alert, oriented. She was able to speak in full sentences, had no cough.   She denied leg edema

## 2020-12-10 NOTE — PATIENT INSTRUCTIONS
Remember to bring a list of pulmonary medications and any CPAP or BiPAP machines to your next appointment with the office. Please keep all of your future appointments scheduled by [address]. Out of respect for other patients and providers, you may be asked to reschedule your appointment if you arrive later than your scheduled appointment time. Appointments cancelled less than 24hrs in advance will be considered a no show. Patients with three missed appointments within 1 year or four missed appointments within 2 years can be dismissed from the practice. You may receive a survey regarding the care you received during your visit. Your input is valuable to us. We encourage you to complete and return your survey. We hope you will choose us in the future for your healthcare needs. Pt instructed of all future appointment dates & times, including radiology, labs, procedures & referrals. If procedures were scheduled preparation instructions provided. Instructions on future appointments with [facility] were given.

## 2020-12-10 NOTE — PROGRESS NOTES
MA Communication:   The following orders are received by verbal communication from Real Pederson MD    Orders include: Echo, CT Chest PE         protacol      3 mo FU

## 2020-12-17 ENCOUNTER — OFFICE VISIT (OUTPATIENT)
Dept: FAMILY MEDICINE CLINIC | Age: 75
End: 2020-12-17
Payer: MEDICARE

## 2020-12-17 VITALS
DIASTOLIC BLOOD PRESSURE: 88 MMHG | HEIGHT: 64 IN | BODY MASS INDEX: 35 KG/M2 | HEART RATE: 74 BPM | TEMPERATURE: 97.2 F | OXYGEN SATURATION: 98 % | RESPIRATION RATE: 18 BRPM | WEIGHT: 205 LBS | SYSTOLIC BLOOD PRESSURE: 134 MMHG

## 2020-12-17 LAB
ALBUMIN SERPL-MCNC: 4.3 G/DL (ref 3.4–5)
ALP BLD-CCNC: 114 U/L (ref 40–129)
ALT SERPL-CCNC: 12 U/L (ref 10–40)
ANION GAP SERPL CALCULATED.3IONS-SCNC: 14 MMOL/L (ref 3–16)
AST SERPL-CCNC: 15 U/L (ref 15–37)
BILIRUB SERPL-MCNC: 0.4 MG/DL (ref 0–1)
BILIRUBIN DIRECT: <0.2 MG/DL (ref 0–0.3)
BILIRUBIN, INDIRECT: NORMAL MG/DL (ref 0–1)
BUN BLDV-MCNC: 15 MG/DL (ref 7–20)
CALCIUM SERPL-MCNC: 10.1 MG/DL (ref 8.3–10.6)
CHLORIDE BLD-SCNC: 102 MMOL/L (ref 99–110)
CHOLESTEROL, TOTAL: 171 MG/DL (ref 0–199)
CO2: 26 MMOL/L (ref 21–32)
CREAT SERPL-MCNC: 0.9 MG/DL (ref 0.6–1.2)
GFR AFRICAN AMERICAN: >60
GFR NON-AFRICAN AMERICAN: >60
GLUCOSE BLD-MCNC: 98 MG/DL (ref 70–99)
HCT VFR BLD CALC: 45.9 % (ref 36–48)
HDLC SERPL-MCNC: 46 MG/DL (ref 40–60)
HEMOGLOBIN: 14.7 G/DL (ref 12–16)
LDL CHOLESTEROL CALCULATED: 111 MG/DL
MCH RBC QN AUTO: 26 PG (ref 26–34)
MCHC RBC AUTO-ENTMCNC: 32.1 G/DL (ref 31–36)
MCV RBC AUTO: 80.9 FL (ref 80–100)
PDW BLD-RTO: 17.9 % (ref 12.4–15.4)
PHOSPHORUS: 3.3 MG/DL (ref 2.5–4.9)
PLATELET # BLD: 265 K/UL (ref 135–450)
PMV BLD AUTO: 8.8 FL (ref 5–10.5)
POTASSIUM SERPL-SCNC: 4.7 MMOL/L (ref 3.5–5.1)
RBC # BLD: 5.68 M/UL (ref 4–5.2)
SODIUM BLD-SCNC: 142 MMOL/L (ref 136–145)
TOTAL PROTEIN: 7.4 G/DL (ref 6.4–8.2)
TRIGL SERPL-MCNC: 68 MG/DL (ref 0–150)
VLDLC SERPL CALC-MCNC: 14 MG/DL
WBC # BLD: 6.8 K/UL (ref 4–11)

## 2020-12-17 PROCEDURE — G8427 DOCREV CUR MEDS BY ELIG CLIN: HCPCS | Performed by: PHYSICIAN ASSISTANT

## 2020-12-17 PROCEDURE — 4040F PNEUMOC VAC/ADMIN/RCVD: CPT | Performed by: PHYSICIAN ASSISTANT

## 2020-12-17 PROCEDURE — G8417 CALC BMI ABV UP PARAM F/U: HCPCS | Performed by: PHYSICIAN ASSISTANT

## 2020-12-17 PROCEDURE — G8484 FLU IMMUNIZE NO ADMIN: HCPCS | Performed by: PHYSICIAN ASSISTANT

## 2020-12-17 PROCEDURE — 1123F ACP DISCUSS/DSCN MKR DOCD: CPT | Performed by: PHYSICIAN ASSISTANT

## 2020-12-17 PROCEDURE — 3017F COLORECTAL CA SCREEN DOC REV: CPT | Performed by: PHYSICIAN ASSISTANT

## 2020-12-17 PROCEDURE — 1090F PRES/ABSN URINE INCON ASSESS: CPT | Performed by: PHYSICIAN ASSISTANT

## 2020-12-17 PROCEDURE — G8399 PT W/DXA RESULTS DOCUMENT: HCPCS | Performed by: PHYSICIAN ASSISTANT

## 2020-12-17 PROCEDURE — 1036F TOBACCO NON-USER: CPT | Performed by: PHYSICIAN ASSISTANT

## 2020-12-17 PROCEDURE — 99214 OFFICE O/P EST MOD 30 MIN: CPT | Performed by: PHYSICIAN ASSISTANT

## 2020-12-17 PROCEDURE — 36415 COLL VENOUS BLD VENIPUNCTURE: CPT | Performed by: PHYSICIAN ASSISTANT

## 2020-12-17 PROCEDURE — G0439 PPPS, SUBSEQ VISIT: HCPCS | Performed by: PHYSICIAN ASSISTANT

## 2020-12-17 SDOH — ECONOMIC STABILITY: FOOD INSECURITY: WITHIN THE PAST 12 MONTHS, YOU WORRIED THAT YOUR FOOD WOULD RUN OUT BEFORE YOU GOT MONEY TO BUY MORE.: NEVER TRUE

## 2020-12-17 SDOH — ECONOMIC STABILITY: INCOME INSECURITY: HOW HARD IS IT FOR YOU TO PAY FOR THE VERY BASICS LIKE FOOD, HOUSING, MEDICAL CARE, AND HEATING?: NOT HARD AT ALL

## 2020-12-17 SDOH — ECONOMIC STABILITY: TRANSPORTATION INSECURITY
IN THE PAST 12 MONTHS, HAS LACK OF TRANSPORTATION KEPT YOU FROM MEETINGS, WORK, OR FROM GETTING THINGS NEEDED FOR DAILY LIVING?: NO

## 2020-12-17 SDOH — ECONOMIC STABILITY: FOOD INSECURITY: WITHIN THE PAST 12 MONTHS, THE FOOD YOU BOUGHT JUST DIDN'T LAST AND YOU DIDN'T HAVE MONEY TO GET MORE.: NEVER TRUE

## 2020-12-17 SDOH — ECONOMIC STABILITY: TRANSPORTATION INSECURITY
IN THE PAST 12 MONTHS, HAS THE LACK OF TRANSPORTATION KEPT YOU FROM MEDICAL APPOINTMENTS OR FROM GETTING MEDICATIONS?: NO

## 2020-12-17 ASSESSMENT — PATIENT HEALTH QUESTIONNAIRE - PHQ9
SUM OF ALL RESPONSES TO PHQ QUESTIONS 1-9: 0
SUM OF ALL RESPONSES TO PHQ QUESTIONS 1-9: 0
2. FEELING DOWN, DEPRESSED OR HOPELESS: 0
SUM OF ALL RESPONSES TO PHQ9 QUESTIONS 1 & 2: 0
1. LITTLE INTEREST OR PLEASURE IN DOING THINGS: 0
SUM OF ALL RESPONSES TO PHQ QUESTIONS 1-9: 0

## 2020-12-17 ASSESSMENT — LIFESTYLE VARIABLES: HOW OFTEN DO YOU HAVE A DRINK CONTAINING ALCOHOL: 0

## 2020-12-17 NOTE — PROGRESS NOTES
Medicare Annual Wellness Visit  Name: Dillon Browning Date: 2020   MRN: <[id number]> Sex: Female   Age: 70 y.o. Ethnicity: Non-/Non    : 1945 Race: Tavo Vargas is here for Annual Exam (AWV) and Hypertension (6 mo fu)    Screenings for behavioral, psychosocial and functional/safety risks, and cognitive dysfunction are all negative except as indicated below. These results, as well as other patient data from the [address] form, are documented in Flowsheets linked to this Encounter. No Known Allergies      Prior to Visit Medications    Medication Sig Taking?  Authorizing Provider   amLODIPine (NORVASC) 10 MG tablet TAKE 1 TABLET EVERY DAY Yes TAYLOR Alicea MD   apixaban (ELIQUIS) 5 MG TABS tablet Take 1 tablet by mouth 2 times daily Yes Lew Tejeda MD   labetalol (NORMODYNE) 100 MG tablet TAKE 1 TABLET TWICE DAILY Yes Lew Tejeda MD   aspirin 81 MG tablet Take 1 tablet by mouth daily Yes Lew Tejeda MD         Past Medical History:   Diagnosis Date    Hx of blood clots     Hypertension        Past Surgical History:   Procedure Laterality Date    COLONOSCOPY  2006    COLONOSCOPY  16    colon polyp    EYE SURGERY      cataracts bilateral    HYSTERECTOMY           Family History   Problem Relation Age of Onset    Cancer Mother     Breast Cancer Mother     Coronary Art Dis Father        CareTeam (Including outside providers/suppliers regularly involved in providing care):   Patient Care Team:  Lew Tejeda MD as PCP - General (Internal Medicine)  Janae Matrins MD as PCP - Hematology/Oncology (Medical Oncology)  Lew Tejeda MD as PCP - REHABILITATION [facility] Empaneled Provider    Wt Readings from Last 3 Encounters:   20 205 lb (93 kg)   10/01/20 201 lb 3.2 oz (91.3 kg)   20 204 lb 14.4 oz (92.9 kg)     Vitals:    20 1026   BP: 134/88   Site: Right Upper Arm   Position: Sitting   Cuff Size: Medium Adult   Pulse: 74 Resp: 18   Temp: 97.2 °F (36.2 °C)   TempSrc: Temporal   SpO2: 98%   Weight: 205 lb (93 kg)   Height: 5' 4\" (1.626 m)     Body mass index is 35.19 kg/m². Based upon direct observation of the patient, evaluation of cognition reveals recent and remote memory intact. Patient's complete Health Risk Assessment and screening values have been reviewed and are found in Flowsheets. The following problems were reviewed today and where indicated follow up appointments were made and/or referrals ordered. Positive Risk Factor Screenings with Interventions:            General Health and ACP:  General  In general, how would you say your health is?: Very Good  In the past 7 days, have you experienced any of the following? New or Increased Pain, New or Increased Fatigue, Loneliness, Social Isolation, Stress or Anger?: None of These  Do you get the social and emotional support that you need?: Yes  Do you have a Living Will?: Yes  Advance Directives     Power of  Living Will ACP-Advance Directive ACP-Power of Tory Nicholas on 10/14/20 Not on File Not on File Filed      General Health Risk Interventions:  · feels healthy. No pains. exercises daily but knows she need more.      Health Habits/Nutrition:  Health Habits/Nutrition  Do you exercise for at least 20 minutes 2-3 times per week?: (!) No  Have you lost any weight without trying in the past 3 months?: No  Do you eat fewer than 2 meals per day?: No  Have you seen a dentist within the past year?: (!) No  Body mass index: (!) 35.18  Health Habits/Nutrition Interventions:  · Recommend dentist.    Hearing/Vision:  No exam data present  Hearing/Vision  Do you or your family notice any trouble with your hearing?: No  Do you have difficulty driving, watching TV, or doing any of your daily activities because of your eyesight?: No  Have you had an eye exam within the past year?: (!) No  Hearing/Vision Interventions:  · No isues    Safety:  Safety Do you have working smoke detectors?: (!) No  Have all throw rugs been removed or fastened?: (!) No  Do you have non-slip mats or surfaces in all bathtubs/showers?: (!) No  Do all of your stairways have a railing or banister?: (!) No  Are your doorways, halls and stairs free of clutter?: (!) No  Do you always fasten your seatbelt when you are in a car?: Yes  Safety Interventions:  · Home safety tips provided     Personalized Preventive Plan   Current Health Maintenance Status  Immunization History   Administered Date(s) Administered    Influenza Vaccine, unspecified formulation 09/06/2016    Influenza Virus Vaccine 09/06/2016    Influenza Whole 09/01/2014    Influenza, High Dose (Fluzone 65 yrs and older) 09/18/2014, 09/15/2015, 08/31/2016, 09/11/2017, 09/20/2018, 10/07/2019    Influenza, Quadv, adjuvanted, 65 yrs +, IM, PF (Fluad) 10/01/2020    Pneumococcal Conjugate 13-valent ([id number]) 03/30/2016    Pneumococcal Polysaccharide ([id number]) 07/12/2018    Tdap (Boostrix, Adacel) 03/09/2018, 10/13/2018        Health Maintenance   Topic Date Due    Shingles Vaccine (1 of 2) 04/21/1997    Breast cancer screen  07/18/2019    Annual Wellness Visit (AWV)  10/01/2020    Potassium monitoring  09/04/2021    Creatinine monitoring  09/04/2021    Lipid screen  06/15/2025    Colon cancer screen colonoscopy  04/19/2026    DTaP/Tdap/Td vaccine (3 - Td) 10/13/2028    DEXA (modify frequency per FRAX score)  Completed    Flu vaccine  Completed    Pneumococcal 65+ years Vaccine  Completed    Hepatitis C screen  Completed    Hepatitis A vaccine  Aged Out    Hepatitis B vaccine  Aged Out    Hib vaccine  Aged Out    Meningococcal (ACWY) vaccine  Aged Out     Recommendations for [employer] Due: see orders and patient instructions/AVS.  .   Recommended screening schedule for the next 5-10 years is provided to the patient in written form: see Patient Instructions/AVS. Jessi Costa was seen today for annual exam.    Diagnoses and all orders for this visit:    Routine general medical examination at a health care facility                [address] [place] [address]  Chief Complaint   Patient presents with    Annual Exam     AWV    Hypertension     6 mo fu       HISTORY OF PRESENT  ILLNESS  Luli Garcia is a 70 y.o.  female          Pulmonary embolism, bilateral w saddle emboli  Doing well on Eliquis. Takes aspirin as well. Will remain on for life. No bleeding complaints. HTN, goal below 150/90  No change in meds, no c/o with meds, no chest pain, SOB, palpatations, or syncope. Home bp was 149-834H/94U.     Metabolic syndrome/ Obesity (BMI 30-39. 9)/   Low level of high density lipoprotein (HDL)    Wt up ~5 lbs.  since October. Drinks her 2 diet Pepsi's a day. Does not monitor diet. Low HDL. Diet and wt about same. Right shin slow healing wound with eschar: Injured shin about a year ago but continues to pick at the eschar. It has not fully healed. Denies tenderness, erythema, exudates. Health maintenance: Shingrix is too expensive for her to get. Knows she needs a mammogram.    ROS:  Remaining 14 ROS were reviewed and are unremarkable for other constitutional, EENT, cardiac, pulmonary, GI, , neurologic, musculoskeletal, or integumentary complaints. PAST MEDICAL/SURGICAL, SOCIAL, &  FAMILY HISTORY:  Reviewed and updated accordingly. ALLERGIES :   Patient has no known allergies. MEDICATIONS:  Prior to Visit Medications    Medication Sig Taking?  Authorizing Provider   amLODIPine (NORVASC) 10 MG tablet TAKE 1 TABLET EVERY DAY Yes Nery Dwyer MD   apixaban (ELIQUIS) 5 MG TABS tablet Take 1 tablet by mouth 2 times daily Yes Nery Dwyer MD   labetalol (NORMODYNE) 100 MG tablet TAKE 1 TABLET TWICE DAILY Yes Nery Dwyer MD   aspirin 81 MG tablet Take 1 tablet by mouth daily Yes Nery Dwyer MD         PHYSICAL EXAM Health maintenance: Shingrix is too expensive for her to get.   Knows she needs a mammogram.      Follow Up 1 year for AWV & every 6 months

## 2020-12-17 NOTE — PATIENT INSTRUCTIONS
Personalized Preventive Plan for Fuentes Qureshi - 12/17/2020  Medicare offers a range of preventive health benefits. Some of the tests and screenings are paid in full while other may be subject to a deductible, co-insurance, and/or copay. Some of these benefits include a comprehensive review of your medical history including lifestyle, illnesses that may run in your family, and various assessments and screenings as appropriate. After reviewing your medical record and screening and assessments performed today your provider may have ordered immunizations, labs, imaging, and/or referrals for you. A list of these orders (if applicable) as well as your Preventive Care list are included within your After Visit Summary for your review. Other Preventive Recommendations:    · A preventive eye exam performed by an eye specialist is recommended every 1-2 years to screen for glaucoma; cataracts, macular degeneration, and other eye disorders. · A preventive dental visit is recommended every 6 months. · Try to get at least 150 minutes of exercise per week or 10,000 steps per day on a pedometer . · Order or download the FREE \"Exercise & Physical Activity: Your Everyday Guide\" from [employer]. Call 9-116.929.9075 or search [employer] online. · You need 9080-4517 mg of calcium and 6266-8720 IU of vitamin D per day. It is possible to meet your calcium requirement with diet alone, but a vitamin D supplement is usually necessary to meet this goal.  · When exposed to the sun, use a sunscreen that protects against both UVA and UVB radiation with an SPF of 30 or greater. Reapply every 2 to 3 hours or after sweating, drying off with a towel, or swimming. · Always wear a seat belt when traveling in a car. Always wear a helmet when riding a bicycle or motorcycle. Needs mammogram and Shringrix vaccine.   Personalized Preventive Plan for Fuentes Qureshi - 12/17/2020 Medicare offers a range of preventive health benefits. Some of the tests and screenings are paid in full while other may be subject to a deductible, co-insurance, and/or copay. Some of these benefits include a comprehensive review of your medical history including lifestyle, illnesses that may run in your family, and various assessments and screenings as appropriate. After reviewing your medical record and screening and assessments performed today your provider may have ordered immunizations, labs, imaging, and/or referrals for you. A list of these orders (if applicable) as well as your Preventive Care list are included within your After Visit Summary for your review. Other Preventive Recommendations:    · A preventive eye exam performed by an eye specialist is recommended every 1-2 years to screen for glaucoma; cataracts, macular degeneration, and other eye disorders. · A preventive dental visit is recommended every 6 months. · Try to get at least 150 minutes of exercise per week or 10,000 steps per day on a pedometer . · Order or download the FREE \"Exercise & Physical Activity: Your Everyday Guide\" from [employer]. Call 5-886.277.6777 or search [employer] online. · You need 4692-5416 mg of calcium and 4393-5972 IU of vitamin D per day. It is possible to meet your calcium requirement with diet alone, but a vitamin D supplement is usually necessary to meet this goal.  · When exposed to the sun, use a sunscreen that protects against both UVA and UVB radiation with an SPF of 30 or greater. Reapply every 2 to 3 hours or after sweating, drying off with a towel, or swimming. · Always wear a seat belt when traveling in a car. Always wear a helmet when riding a bicycle or motorcycle.

## 2021-02-12 RX ORDER — APIXABAN 5 MG/1
TABLET, FILM COATED ORAL
Qty: 180 TABLET | Refills: 1 | Status: SHIPPED | OUTPATIENT
Start: 2021-02-12 | End: 2021-07-08

## 2021-02-12 RX ORDER — LABETALOL 100 MG/1
TABLET, FILM COATED ORAL
Qty: 180 TABLET | Refills: 1 | Status: SHIPPED | OUTPATIENT
Start: 2021-02-12 | End: 2021-07-08

## 2021-02-23 ENCOUNTER — IMMUNIZATION (OUTPATIENT)
Dept: PRIMARY CARE CLINIC | Age: 76
End: 2021-02-23
Payer: MEDICARE

## 2021-02-23 PROCEDURE — 0011A PR IMM ADMN SARSCOV2 100 MCG/0.5 ML 1ST DOSE: CPT | Performed by: FAMILY MEDICINE

## 2021-02-23 PROCEDURE — 91301 COVID-19, MODERNA VACCINE 100MCG/0.5ML DOSE: CPT | Performed by: FAMILY MEDICINE

## 2021-03-01 ENCOUNTER — HOSPITAL ENCOUNTER (OUTPATIENT)
Dept: NON INVASIVE DIAGNOSTICS | Age: 76
Discharge: HOME | End: 2021-03-01
Payer: MEDICARE

## 2021-03-01 ENCOUNTER — HOSPITAL ENCOUNTER (OUTPATIENT)
Dept: CT IMAGING | Age: 76
Discharge: HOME | End: 2021-03-01
Payer: MEDICARE

## 2021-03-01 ENCOUNTER — APPOINTMENT (OUTPATIENT)
Dept: CT IMAGING | Age: 76
End: 2021-03-01
Payer: MEDICARE

## 2021-03-01 ENCOUNTER — HOSPITAL ENCOUNTER (OUTPATIENT)
Age: 76
Discharge: HOME | End: 2021-03-01
Payer: MEDICARE

## 2021-03-01 DIAGNOSIS — I27.20 PULMONARY HYPERTENSION (HCC): ICD-10-CM

## 2021-03-01 DIAGNOSIS — I26.92 SADDLE EMBOLUS OF PULMONARY ARTERY WITHOUT ACUTE COR PULMONALE, UNSPECIFIED CHRONICITY (HCC): ICD-10-CM

## 2021-03-01 LAB
BUN BLDV-MCNC: 20 MG/DL (ref 7–20)
CREAT SERPL-MCNC: 1.1 MG/DL (ref 0.6–1.2)
GFR AFRICAN AMERICAN: 59
GFR NON-AFRICAN AMERICAN: 49
LV EF: 65 %
LVEF MODALITY: NORMAL

## 2021-03-01 PROCEDURE — 84520 ASSAY OF UREA NITROGEN: CPT

## 2021-03-01 PROCEDURE — 82565 ASSAY OF CREATININE: CPT

## 2021-03-01 PROCEDURE — 71260 CT THORAX DX C+: CPT

## 2021-03-01 PROCEDURE — 36415 COLL VENOUS BLD VENIPUNCTURE: CPT

## 2021-03-01 PROCEDURE — 93306 TTE W/DOPPLER COMPLETE: CPT

## 2021-03-01 PROCEDURE — 6360000004 HC RX CONTRAST MEDICATION: Performed by: INTERNAL MEDICINE

## 2021-03-01 RX ADMIN — IOPAMIDOL 75 ML: 755 INJECTION, SOLUTION INTRAVENOUS at 12:05

## 2021-03-02 ENCOUNTER — VIRTUAL VISIT (OUTPATIENT)
Dept: PULMONOLOGY | Age: 76
End: 2021-03-02
Payer: MEDICARE

## 2021-03-02 DIAGNOSIS — I26.92 SADDLE EMBOLUS OF PULMONARY ARTERY WITHOUT ACUTE COR PULMONALE, UNSPECIFIED CHRONICITY (HCC): Primary | ICD-10-CM

## 2021-03-02 DIAGNOSIS — I82.412 ACUTE DEEP VEIN THROMBOSIS (DVT) OF FEMORAL VEIN OF LEFT LOWER EXTREMITY (HCC): ICD-10-CM

## 2021-03-02 DIAGNOSIS — R91.8 MULTIPLE LUNG NODULES: ICD-10-CM

## 2021-03-02 DIAGNOSIS — I27.20 PULMONARY HYPERTENSION (HCC): ICD-10-CM

## 2021-03-02 PROCEDURE — G8399 PT W/DXA RESULTS DOCUMENT: HCPCS | Performed by: INTERNAL MEDICINE

## 2021-03-02 PROCEDURE — G8484 FLU IMMUNIZE NO ADMIN: HCPCS | Performed by: INTERNAL MEDICINE

## 2021-03-02 PROCEDURE — 99212 OFFICE O/P EST SF 10 MIN: CPT | Performed by: INTERNAL MEDICINE

## 2021-03-02 PROCEDURE — G8427 DOCREV CUR MEDS BY ELIG CLIN: HCPCS | Performed by: INTERNAL MEDICINE

## 2021-03-02 PROCEDURE — 1123F ACP DISCUSS/DSCN MKR DOCD: CPT | Performed by: INTERNAL MEDICINE

## 2021-03-02 PROCEDURE — 1036F TOBACCO NON-USER: CPT | Performed by: INTERNAL MEDICINE

## 2021-03-02 PROCEDURE — 4040F PNEUMOC VAC/ADMIN/RCVD: CPT | Performed by: INTERNAL MEDICINE

## 2021-03-02 PROCEDURE — 3017F COLORECTAL CA SCREEN DOC REV: CPT | Performed by: INTERNAL MEDICINE

## 2021-03-02 PROCEDURE — G8417 CALC BMI ABV UP PARAM F/U: HCPCS | Performed by: INTERNAL MEDICINE

## 2021-03-02 PROCEDURE — 1090F PRES/ABSN URINE INCON ASSESS: CPT | Performed by: INTERNAL MEDICINE

## 2021-03-02 ASSESSMENT — ENCOUNTER SYMPTOMS: SHORTNESS OF BREATH: 1

## 2021-03-02 NOTE — PROGRESS NOTES
Pulmonary Outpatient Note   Latasha Hernández MD       3/2/2021    1. Saddle embolus of pulmonary artery without acute cor pulmonale, unspecified chronicity ([facility]    2. Pulmonary hypertension ([facility]    3. Acute deep vein thrombosis (DVT) of femoral vein of left lower extremity (HCC)    4. Multiple lung nodules          ASSESSMENT/PLAN:  Pulmonary embolism history, acute DVT. She underwent catheter directed thrombolysis, remains on Eliquis. She denies leg edema, shortness of breath or chest pain. CT chest showed no significant clot, results were discussed with her. She is aware she will need lifelong anticoagulation. Pulmonary hypertension. Echocardiogram did not show pulmonary hypertension, results were discussed with her. She does not need work-up for CTEPH at present. Multiple lung nodules. Small, probably not significant. Not significant on repeat CT chest.  Prophylaxis. Has received a flu shot, up-to-date with her pneumonia vaccines. She has also received a COVID-19 vaccine. Follow-up as needed    Sharee Rutledge is a 70 y.o. female evaluated via telephone on 3/2/2021. Consent:  She and/or health care decision maker is aware that that she may receive a bill for this telephone service, depending on her insurance coverage, and has provided verbal consent to proceed: Yes      Documentation:  I communicated with the patient and/or health care decision maker about PE, DVT, pulmonary hypertension. Details of this discussion including any medical advice provided: As per note      I affirm this is a Patient Initiated Episode with a Patient who has not had a related appointment within my department in the past 7 days or scheduled within the next 24 hours.     Patient identification was verified at the start of the visit: Yes    Total Time: minutes: 11-20 minutes    Note: not billable if this call serves to triage the patient into an appointment for the relevant concern      Latasha Hernández No orders of the defined types were placed in this encounter. No follow-ups on file. Chief Complaint:   Follow-up and Results (CT Echo)       HPI: Niko Sosa is a 70y.o. year old female here for follow-up. This was a virtual visit via telephone at her request.  Her PCP is Dr. Nelson Erickson. Jackelyn Gomes was seen in virtual visit on 12/10/2020 for saddle PE, pulmonary hypertension, left lower extremity DVT, multiple lung nodules. She returns for follow-up after completing CT chest with PE protocol, echocardiogram.  The patient is feeling well, denies any shortness of breath except when it is cold outdoors. She says the mask helps her to breathe better. She denies any cough or wheezing. He denies lower extremity edema, her sleep is fair. She has a good appetite, denies GI or  complaints. The rest of her ROS was unremarkable. There was no other change in her medical or surgical history, medications and allergies were reviewed. CT chest with PE protocol 3/1/2020     No evidence of pulmonary embolism or acute pulmonary abnormality. Echocardiogram 3/1/2020  Technically difficult examination. Left ventricular systolic function is hyperdynamic with an estimated   ejection fraction of >= 65%. The left ventricle is normal in size with   normal wall thickness. No regional wall motion abnormalities are noted. Grade I diastolic dysfunction with normal LV pressure. Mild mitral annular calcification. Mild mitral and tricuspid regurgitation. Systolic pulmonary artery pressure (SPAP) is normal and estimated at 28 mmHg   (right atrial pressure 8 mmHg). Previous notes reviewed and edited as necessary. Deny Fofana is a very pleasant 70-year-old female who was seen in September for bilateral submassive pulmonary embolism with elevated troponin and proBNP. She also had an acute DVT in the left proximal to distal femoral vein. She had a past history of essential hypertension, prior DVT. She had hyponatremia, elevated blood glucose, leukocytosis. She underwent catheter directed thrombolysis on 9/2/2020. She was discharged on Eliquis. The patient has been doing extremely well, she denies shortness of breath, cough, wheezing, chest pain. She denies leg edema. Unfortunately CT chest was performed without contrast.  She is aware that she will need Eliquis lifelong. There was no other change in her medical or surgical history, medications were reviewed. The rest of her ROS was negative. CT chest 12/8/20  Stable tiny pulmonary nodules         Lytic and sclerotic focus in T5 of uncertain significance. .  Consider bone    scan for further characterization. Echocardiogram 9/1/2020  Left ventricular systolic function is hyperdynamic with ejection fraction estimated at >65%. There is diastolic septal flattening consistent with right ventricular volume overload. There is mild concentric left ventricular hypertrophy. Grade I diastolic dysfunction with normal left ventricular filling pressure. The right ventricle is severely enlarged. Stinson''s sign is noted. Right ventricular lateral wall is akinetic. Right ventricular apex is spared. The right atrium is mildly dilated. Moderate tricuspid regurgitation. Systolic pulmonary artery pressure (SPAP) is estimated at 70 mmHg consistent with severe pulmonary hypertension (Right atrial pressure of 8 mmHg). Findings: The xenon ventilation study demonstrates normal single    breath images.  There is near-complete loss of xenon during the    rebreathing phase.         The perfusion study demonstrates moderately decreased perfusion in both upper lobes. There is mildly decreased perfusion in the    superior segment of the left lower lobe. There are small to    moderate perfusion defects in the middle lobe involving both the    medial and lateral segments.       Previous notes reviewed and edited as necessary  Evaluation was requested by Dr. Ketty Bearden regarding shortness of breath, bilateral PE.    The patient was evaluated in the ER, underwent CT chest with PE protocol, lab work. This showed extensive clot burden in the main pulmonary arteries bilaterally, elevated BNP and troponin. Our service was therefore consulted by Dr. Florencio Caban.       Past Medical History:   Diagnosis Date    Hx of blood clots 2015    Hypertension        Past Surgical History:   Procedure Laterality Date    COLONOSCOPY  2006    COLONOSCOPY  4/19/16    colon polyp    EYE SURGERY      cataracts bilateral    HYSTERECTOMY         Social History     Tobacco Use    Smoking status: Never Smoker    Smokeless tobacco: Never Used   Substance Use Topics    Alcohol use: Yes     Alcohol/week: 0.0 standard drinks     Comment: rarely       Family History   Problem Relation Age of Onset    Cancer Mother     Breast Cancer Mother     Coronary Art Dis Father          Current Outpatient Medications:     labetalol (NORMODYNE) 100 MG tablet, TAKE 1 TABLET TWICE DAILY, Disp: 180 tablet, Rfl: 1    ELIQUIS 5 MG TABS tablet, TAKE 1 TABLET TWICE DAILY, Disp: 180 tablet, Rfl: 1    amLODIPine (NORVASC) 10 MG tablet, TAKE 1 TABLET EVERY DAY, Disp: 90 tablet, Rfl: 3    aspirin 81 MG tablet, Take 1 tablet by mouth daily, Disp: 90 tablet, Rfl: 3    Patient has no known allergies. There were no vitals filed for this visit. Review of Systems   Respiratory: Positive for shortness of breath. All other systems reviewed and are negative. Physical Exam  Physical exam could not be performed as this was a virtual visit via telephone. The patient was very pleasant, awake, alert, oriented. She was able to speak in full sentences, had no cough.   She denied leg edema

## 2021-03-02 NOTE — PROGRESS NOTES
MA Communication:   The following orders are received by verbal communication from Aubrey Eduardo MD    Orders include:No fu

## 2021-03-23 ENCOUNTER — IMMUNIZATION (OUTPATIENT)
Dept: PRIMARY CARE CLINIC | Age: 76
End: 2021-03-23
Payer: MEDICARE

## 2021-03-23 PROCEDURE — 0012A PR IMM ADMN SARSCOV2 100 MCG/0.5 ML 2ND DOSE: CPT | Performed by: FAMILY MEDICINE

## 2021-03-23 PROCEDURE — 91301 COVID-19, MODERNA VACCINE 100MCG/0.5ML DOSE: CPT | Performed by: FAMILY MEDICINE

## 2021-06-25 ENCOUNTER — OFFICE VISIT (OUTPATIENT)
Dept: FAMILY MEDICINE CLINIC | Age: 76
End: 2021-06-25
Payer: MEDICARE

## 2021-06-25 VITALS
BODY MASS INDEX: 36.54 KG/M2 | WEIGHT: 214 LBS | HEIGHT: 64 IN | OXYGEN SATURATION: 98 % | HEART RATE: 70 BPM | RESPIRATION RATE: 16 BRPM | DIASTOLIC BLOOD PRESSURE: 90 MMHG | SYSTOLIC BLOOD PRESSURE: 140 MMHG

## 2021-06-25 DIAGNOSIS — I26.99 PE (PULMONARY THROMBOEMBOLISM) (HCC): ICD-10-CM

## 2021-06-25 DIAGNOSIS — E66.9 OBESITY (BMI 35.0-39.9 WITHOUT COMORBIDITY): ICD-10-CM

## 2021-06-25 DIAGNOSIS — I10 ESSENTIAL HYPERTENSION: Primary | ICD-10-CM

## 2021-06-25 DIAGNOSIS — E88.81 METABOLIC SYNDROME: ICD-10-CM

## 2021-06-25 LAB
ANION GAP SERPL CALCULATED.3IONS-SCNC: 13 MMOL/L (ref 3–16)
BUN BLDV-MCNC: 14 MG/DL (ref 7–20)
CALCIUM SERPL-MCNC: 9.5 MG/DL (ref 8.3–10.6)
CHLORIDE BLD-SCNC: 103 MMOL/L (ref 99–110)
CO2: 24 MMOL/L (ref 21–32)
CREAT SERPL-MCNC: 0.9 MG/DL (ref 0.6–1.2)
GFR AFRICAN AMERICAN: >60
GFR NON-AFRICAN AMERICAN: >60
GLUCOSE BLD-MCNC: 94 MG/DL (ref 70–99)
POTASSIUM SERPL-SCNC: 4.5 MMOL/L (ref 3.5–5.1)
SODIUM BLD-SCNC: 140 MMOL/L (ref 136–145)

## 2021-06-25 PROCEDURE — G8399 PT W/DXA RESULTS DOCUMENT: HCPCS | Performed by: INTERNAL MEDICINE

## 2021-06-25 PROCEDURE — 1090F PRES/ABSN URINE INCON ASSESS: CPT | Performed by: INTERNAL MEDICINE

## 2021-06-25 PROCEDURE — 3017F COLORECTAL CA SCREEN DOC REV: CPT | Performed by: INTERNAL MEDICINE

## 2021-06-25 PROCEDURE — 1123F ACP DISCUSS/DSCN MKR DOCD: CPT | Performed by: INTERNAL MEDICINE

## 2021-06-25 PROCEDURE — 36415 COLL VENOUS BLD VENIPUNCTURE: CPT | Performed by: INTERNAL MEDICINE

## 2021-06-25 PROCEDURE — G8417 CALC BMI ABV UP PARAM F/U: HCPCS | Performed by: INTERNAL MEDICINE

## 2021-06-25 PROCEDURE — 1036F TOBACCO NON-USER: CPT | Performed by: INTERNAL MEDICINE

## 2021-06-25 PROCEDURE — 4040F PNEUMOC VAC/ADMIN/RCVD: CPT | Performed by: INTERNAL MEDICINE

## 2021-06-25 PROCEDURE — 99214 OFFICE O/P EST MOD 30 MIN: CPT | Performed by: INTERNAL MEDICINE

## 2021-06-25 PROCEDURE — G8427 DOCREV CUR MEDS BY ELIG CLIN: HCPCS | Performed by: INTERNAL MEDICINE

## 2021-06-25 ASSESSMENT — PATIENT HEALTH QUESTIONNAIRE - PHQ9
1. LITTLE INTEREST OR PLEASURE IN DOING THINGS: 0
SUM OF ALL RESPONSES TO PHQ QUESTIONS 1-9: 0
SUM OF ALL RESPONSES TO PHQ9 QUESTIONS 1 & 2: 0
SUM OF ALL RESPONSES TO PHQ QUESTIONS 1-9: 0
SUM OF ALL RESPONSES TO PHQ QUESTIONS 1-9: 0
2. FEELING DOWN, DEPRESSED OR HOPELESS: 0

## 2021-06-25 ASSESSMENT — ENCOUNTER SYMPTOMS
RESPIRATORY NEGATIVE: 1
GASTROINTESTINAL NEGATIVE: 1
ALLERGIC/IMMUNOLOGIC NEGATIVE: 1

## 2021-06-25 NOTE — PATIENT INSTRUCTIONS
Pe (Pulmonary Thromboembolism) (Hcc). Continue meds. Metabolic Syndrome. To improve diet and lose weight. Essential Hypertension. Continue present meds, will look at labs and decide which med to add. Home BP checks. Call if>140/90. Improve diet. Avoid caffeine and salt. Call if new c/o w/ meds. Obesity (Bmi 35.0-39.9 Without Comorbidity). Discussed wt loss options. Call if new c/o.

## 2021-06-25 NOTE — ASSESSMENT & PLAN NOTE
No change in meds, no c/o with meds, no chest pain, SOB, palpatations, or syncope. Home bp was 130-140s/70s.

## 2021-06-25 NOTE — ASSESSMENT & PLAN NOTE
Heme w/u showed no reason to continue Xarelto and was stopped in 1/2016. On Asa 81 mg w/ no sign of DVT. No c/o of increased SOB or CP until the day prior to admission to hospital 9/1/2020. Found to have PE.  DVT was then found L leg by doppler. Started on [place]. Permanent.

## 2021-06-25 NOTE — PROGRESS NOTES
Subjective:      Patient ID: Octavio Sanford is a 78 y.o. female. HPI  Essential hypertension  No change in meds, no c/o with meds, no chest pain, SOB, palpatations, or syncope. Home bp was 130-140s/70s. Obesity (BMI 35.0-39.9 without comorbidity)  Gained 14 lbs in last 8 mo. Increased edema. Metabolic syndrome   recent wt gain of 14 lbs in last 8 mo. No change in activity. Cholesterol and sugar high in past.     PE (pulmonary thromboembolism) (HCC)  Heme w/u showed no reason to continue Xarelto and was stopped in 1/2016. On Asa 81 mg w/ no sign of DVT. No c/o of increased SOB or CP until the day prior to admission to hospital 9/1/2020. Found to have PE.  DVT was then found L leg by doppler. Started on [place]. Permanent. Review of Systems   Constitutional: Positive for fatigue. Respiratory: Negative. Cardiovascular: Positive for leg swelling. Gastrointestinal: Negative. Endocrine: Negative. Genitourinary: Negative. Musculoskeletal: Negative. Skin: Negative. Allergic/Immunologic: Negative. Neurological: Negative. Hematological: Negative. Vitals:    06/25/21 1119 06/25/21 1152   BP: (!) 142/80 (!) 140/90   Pulse: 70    Resp: 16    SpO2: 98%    Weight: 214 lb (97.1 kg)    Height: 5' 4\" (1.626 m)      Objective:   Physical Exam  Constitutional:       General: She is not in acute distress. Appearance: She is well-developed. She is obese. She is not ill-appearing, toxic-appearing or diaphoretic. HENT:      Head: Normocephalic and atraumatic. Eyes:      Conjunctiva/sclera: Conjunctivae normal.      Pupils: Pupils are equal, round, and reactive to light. Neck:      Thyroid: No thyroid mass or thyromegaly. Vascular: Normal carotid pulses. No carotid bruit, hepatojugular reflux or JVD. Trachea: Trachea normal. No tracheal deviation. Cardiovascular:      Rate and Rhythm: Normal rate and regular rhythm. No extrasystoles are present.      Chest Wall: PMI is not displaced. Pulses: Normal pulses. No decreased pulses. Carotid pulses are 2+ on the right side and 2+ on the left side. Radial pulses are 2+ on the right side and 2+ on the left side. Femoral pulses are 2+ on the right side and 2+ on the left side. Popliteal pulses are 2+ on the right side and 2+ on the left side. Dorsalis pedis pulses are 2+ on the right side and 2+ on the left side. Posterior tibial pulses are 2+ on the right side and 2+ on the left side. Heart sounds: Normal heart sounds, S1 normal and S2 normal. Heart sounds not distant. No murmur heard. No friction rub. No gallop. No S3 or S4 sounds. Pulmonary:      Effort: Pulmonary effort is normal. No tachypnea, bradypnea, accessory muscle usage or respiratory distress. Breath sounds: Normal breath sounds. No decreased breath sounds, wheezing, rhonchi or rales. Chest:      Chest wall: No tenderness. Musculoskeletal:         General: No tenderness. Normal range of motion. Cervical back: Full passive range of motion without pain, normal range of motion and neck supple. No edema or erythema. No spinous process tenderness or muscular tenderness. Normal range of motion. Right lower leg: Edema present. Left lower leg: Edema present. Lymphadenopathy:      Cervical: No cervical adenopathy. Skin:     General: Skin is warm and dry. Capillary Refill: Capillary refill takes less than 2 seconds. Coloration: Skin is not jaundiced or pale. Findings: No abrasion, bruising, erythema, lesion or rash. Nails: There is no clubbing. Neurological:      General: No focal deficit present. Mental Status: She is alert and oriented to person, place, and time. Mental status is at baseline. She is not disoriented. Cranial Nerves: No cranial nerve deficit. Sensory: No sensory deficit. Motor: No weakness, tremor, atrophy or abnormal muscle tone. Coordination: Coordination normal.      Gait: Gait normal.      Deep Tendon Reflexes: Reflexes are normal and symmetric. Psychiatric:         Mood and Affect: Mood normal.         Speech: Speech normal.         Behavior: Behavior normal.         Thought Content: Thought content normal.         Judgment: Judgment normal.         Assessment / Plan:     Pe (Pulmonary Thromboembolism) (Hcc). Continue meds. Metabolic Syndrome. To improve diet and lose weight. Essential Hypertension. Continue present meds, will look at labs and decide which med to add. Home BP checks. Call if>140/90. Improve diet. Avoid caffeine and salt. Call if new c/o w/ meds. Obesity (Bmi 35.0-39.9 Without Comorbidity). Discussed wt loss options. Call if new c/o.

## 2021-07-08 RX ORDER — APIXABAN 5 MG/1
TABLET, FILM COATED ORAL
Qty: 180 TABLET | Refills: 1 | Status: SHIPPED | OUTPATIENT
Start: 2021-07-08 | End: 2022-02-22

## 2021-07-08 RX ORDER — LABETALOL 100 MG/1
TABLET, FILM COATED ORAL
Qty: 180 TABLET | Refills: 1 | Status: SHIPPED | OUTPATIENT
Start: 2021-07-08 | End: 2022-02-22

## 2021-08-24 RX ORDER — AMLODIPINE BESYLATE 10 MG/1
TABLET ORAL
Qty: 90 TABLET | Refills: 3 | Status: SHIPPED | OUTPATIENT
Start: 2021-08-24 | End: 2022-06-23 | Stop reason: SDUPTHER

## 2021-09-20 ENCOUNTER — TELEPHONE (OUTPATIENT)
Dept: FAMILY MEDICINE CLINIC | Age: 76
End: 2021-09-20

## 2021-09-20 NOTE — TELEPHONE ENCOUNTER
Patient would like to know if you would accept her as a NTP since Dr. Jimbo Bliss is retiring. If accepted, I advised we could use the December appt as her NTP. Accept?

## 2021-09-21 NOTE — TELEPHONE ENCOUNTER
I would make her 6 month appt for her in anytime in dec with Dr. Nirmal Kessler ( he has appts) and then she can establish with me in 2022

## 2022-01-06 ENCOUNTER — OFFICE VISIT (OUTPATIENT)
Dept: FAMILY MEDICINE CLINIC | Age: 77
End: 2022-01-06
Payer: MEDICARE

## 2022-01-06 VITALS
OXYGEN SATURATION: 98 % | HEIGHT: 64 IN | SYSTOLIC BLOOD PRESSURE: 138 MMHG | HEART RATE: 67 BPM | DIASTOLIC BLOOD PRESSURE: 72 MMHG | WEIGHT: 206.2 LBS | BODY MASS INDEX: 35.2 KG/M2

## 2022-01-06 DIAGNOSIS — I27.20 PULMONARY HYPERTENSION (HCC): ICD-10-CM

## 2022-01-06 DIAGNOSIS — Z12.31 BREAST CANCER SCREENING BY MAMMOGRAM: ICD-10-CM

## 2022-01-06 DIAGNOSIS — I10 ESSENTIAL HYPERTENSION: Primary | ICD-10-CM

## 2022-01-06 DIAGNOSIS — I26.99 PE (PULMONARY THROMBOEMBOLISM) (HCC): ICD-10-CM

## 2022-01-06 DIAGNOSIS — E66.01 SEVERE OBESITY (BMI 35.0-35.9 WITH COMORBIDITY) (HCC): ICD-10-CM

## 2022-01-06 DIAGNOSIS — E78.5 DYSLIPIDEMIA: ICD-10-CM

## 2022-01-06 PROBLEM — I82.412 ACUTE DEEP VEIN THROMBOSIS (DVT) OF FEMORAL VEIN OF LEFT LOWER EXTREMITY (HCC): Status: RESOLVED | Noted: 2020-09-04 | Resolved: 2022-01-06

## 2022-01-06 PROBLEM — E78.6 LOW LEVEL OF HIGH DENSITY LIPOPROTEIN (HDL): Status: RESOLVED | Noted: 2018-04-12 | Resolved: 2022-01-06

## 2022-01-06 LAB
A/G RATIO: 1.4 (ref 1.1–2.2)
ALBUMIN SERPL-MCNC: 4.1 G/DL (ref 3.4–5)
ALP BLD-CCNC: 124 U/L (ref 40–129)
ALT SERPL-CCNC: 9 U/L (ref 10–40)
ANION GAP SERPL CALCULATED.3IONS-SCNC: 12 MMOL/L (ref 3–16)
AST SERPL-CCNC: 12 U/L (ref 15–37)
BILIRUB SERPL-MCNC: 0.4 MG/DL (ref 0–1)
BUN BLDV-MCNC: 19 MG/DL (ref 7–20)
CALCIUM SERPL-MCNC: 9.6 MG/DL (ref 8.3–10.6)
CHLORIDE BLD-SCNC: 106 MMOL/L (ref 99–110)
CHOLESTEROL, TOTAL: 165 MG/DL (ref 0–199)
CO2: 22 MMOL/L (ref 21–32)
CREAT SERPL-MCNC: 0.9 MG/DL (ref 0.6–1.2)
GFR AFRICAN AMERICAN: >60
GFR NON-AFRICAN AMERICAN: >60
GLUCOSE BLD-MCNC: 94 MG/DL (ref 70–99)
HDLC SERPL-MCNC: 47 MG/DL (ref 40–60)
LDL CHOLESTEROL CALCULATED: 104 MG/DL
POTASSIUM SERPL-SCNC: 4.6 MMOL/L (ref 3.5–5.1)
SODIUM BLD-SCNC: 140 MMOL/L (ref 136–145)
TOTAL PROTEIN: 7 G/DL (ref 6.4–8.2)
TRIGL SERPL-MCNC: 71 MG/DL (ref 0–150)
VLDLC SERPL CALC-MCNC: 14 MG/DL

## 2022-01-06 PROCEDURE — 36415 COLL VENOUS BLD VENIPUNCTURE: CPT | Performed by: FAMILY MEDICINE

## 2022-01-06 PROCEDURE — G8427 DOCREV CUR MEDS BY ELIG CLIN: HCPCS | Performed by: FAMILY MEDICINE

## 2022-01-06 PROCEDURE — 3017F COLORECTAL CA SCREEN DOC REV: CPT | Performed by: FAMILY MEDICINE

## 2022-01-06 PROCEDURE — 1123F ACP DISCUSS/DSCN MKR DOCD: CPT | Performed by: FAMILY MEDICINE

## 2022-01-06 PROCEDURE — G8417 CALC BMI ABV UP PARAM F/U: HCPCS | Performed by: FAMILY MEDICINE

## 2022-01-06 PROCEDURE — 4040F PNEUMOC VAC/ADMIN/RCVD: CPT | Performed by: FAMILY MEDICINE

## 2022-01-06 PROCEDURE — 1090F PRES/ABSN URINE INCON ASSESS: CPT | Performed by: FAMILY MEDICINE

## 2022-01-06 PROCEDURE — 99214 OFFICE O/P EST MOD 30 MIN: CPT | Performed by: FAMILY MEDICINE

## 2022-01-06 PROCEDURE — G8484 FLU IMMUNIZE NO ADMIN: HCPCS | Performed by: FAMILY MEDICINE

## 2022-01-06 PROCEDURE — G8399 PT W/DXA RESULTS DOCUMENT: HCPCS | Performed by: FAMILY MEDICINE

## 2022-01-06 PROCEDURE — 1036F TOBACCO NON-USER: CPT | Performed by: FAMILY MEDICINE

## 2022-01-06 ASSESSMENT — ENCOUNTER SYMPTOMS
CHEST TIGHTNESS: 0
EYE REDNESS: 0
WHEEZING: 0
NAUSEA: 0
RHINORRHEA: 0
SHORTNESS OF BREATH: 0
EYE PAIN: 0
ABDOMINAL PAIN: 0
COUGH: 0
VOMITING: 0
BLOOD IN STOOL: 0
EYE DISCHARGE: 0
CONSTIPATION: 0
SINUS PRESSURE: 0
DIARRHEA: 0

## 2022-01-06 NOTE — PATIENT INSTRUCTIONS

## 2022-01-06 NOTE — PROGRESS NOTES
Subjective:      Patient ID: Janna Romeo is a 78 y.o. female. HPI  Chief Complaint   Patient presents with   [facility].     pt states that she is establishing care with Dr. Emilie Loya today, is fasting for bw      Here for htn checkup . Takes meds daily  eliquis  For PE- no problems  Sees pulm  Has [address] breast cancer. Had benign cyst on last US  Has never been on statin  Not exercising, no weight loss  Seeing Pulm for PE and Pulm htn . Denies chest pain, SOB or syncope  No excessive bruising or bleeding from eliquis  Janna Romeo is a 78 y.o. female with the [facility]:  Patient Active Problem List    Diagnosis Date Noted    Pulmonary HTN ([facility] [address] 09/04/2020    PE (pulmonary thromboembolism) ([facility] [address] 04/07/2015    Essential hypertension 12/09/2014    Obesity (BMI 35.0-39.9 without comorbidity) 12/09/2014     Current Outpatient Medications   Medication Sig Dispense Refill    amLODIPine (NORVASC) 10 MG tablet TAKE 1 TABLET EVERY DAY 90 tablet 3    ELIQUIS 5 MG TABS tablet TAKE 1 TABLET TWICE DAILY 180 tablet 1    labetalol (NORMODYNE) 100 MG tablet TAKE 1 TABLET TWICE DAILY 180 tablet 1    aspirin 81 MG tablet Take 1 tablet by mouth daily 90 tablet 3     No current facility-administered medications for this visit. Allergies: Patient has no known allergies. Past Medical History:   Diagnosis Date    Hx of blood clots 2015    Hypertension      Past Surgical History:   Procedure Laterality Date    COLONOSCOPY  2006    COLONOSCOPY  4/19/16    colon polyp    EYE SURGERY      cataracts bilateral    HYSTERECTOMY       Family History   Problem Relation Age of Onset    Cancer Mother     Breast Cancer Mother     Coronary Art Dis Father      Social History     Tobacco Use    Smoking status: Never Smoker    Smokeless tobacco: Never Used   Substance Use Topics    Alcohol use:  Yes     Alcohol/week: 0.0 standard drinks     Comment: rarely     Vitals:    01/06/22 0835   BP: 138/72   Pulse: 67   SpO2: 98%   Weight: 206 lb 3.2 oz (93.5 kg)   Height: 5' 4\" (1.626 m)     Body mass index is 35.39 kg/m². Wt Readings from Last 3 Encounters:   01/06/22 206 lb 3.2 oz (93.5 kg)   06/25/21 214 lb (97.1 kg)   12/17/20 205 lb (93 kg)     BP Readings from Last 3 Encounters:   01/06/22 138/72   06/25/21 (!) 140/90   12/17/20 134/88        Review of Systems   Constitutional: Negative for chills, fatigue, fever and unexpected weight change. HENT: Negative for ear discharge, ear pain, hearing loss, rhinorrhea, sinus pressure and tinnitus. Eyes: Negative for pain, discharge, redness and visual disturbance. Respiratory: Negative for cough, chest tightness, shortness of breath and wheezing. Cardiovascular: Negative for chest pain and palpitations. Gastrointestinal: Negative for abdominal pain, blood in stool, constipation, diarrhea, nausea and vomiting. Genitourinary: Negative for difficulty urinating, dysuria and hematuria. Musculoskeletal: Negative for arthralgias and joint swelling. Neurological: Negative for dizziness, seizures, syncope and headaches. Hematological: Negative for adenopathy. Does not bruise/bleed easily. Psychiatric/Behavioral: Negative for dysphoric mood and sleep disturbance. The patient is not nervous/anxious. Objective:   Physical Exam  Constitutional:       General: She is not in acute distress. Appearance: Normal appearance. She is well-developed. She is obese. HENT:      Head: Normocephalic. Mouth/Throat:      Pharynx: No oropharyngeal exudate. Cardiovascular:      Rate and Rhythm: Normal rate and regular rhythm. Heart sounds: Normal heart sounds. Pulmonary:      Effort: Pulmonary effort is normal.      Breath sounds: Normal breath sounds. Abdominal:      General: Bowel sounds are normal.      Palpations: Abdomen is soft. Tenderness: There is no abdominal tenderness. There is no guarding.    Musculoskeletal: General: No tenderness. Normal range of motion. Cervical back: Neck supple. Skin:     General: Skin is warm. Neurological:      Mental Status: She is alert and oriented to person, place, and time. Psychiatric:         Behavior: Behavior normal.         Thought Content:  Thought content normal.         Judgment: Judgment normal.         Assessment:      htn- stable  hld- diet controlled  PE- h/o, on eliquis  pulm htn- sees pulm  Obese- recommend weight loss  [address] [place]      Plan:     Reviewed history with patient  Orders Placed This Encounter   Procedures    NIC DIGITAL SCREEN W OR WO CAD BILATERAL     Standing Status:   Future     Standing Expiration Date:   3/6/2023     Order Specific Question:   Reason for exam:     Answer:   screen    LIPID PANEL     Order Specific Question:   Is Patient Fasting?/# of Hours     Answer:   12    COMPREHENSIVE METABOLIC PANEL     Refill meds  Time spent 30 min        [address] PENNINGTON, DO

## 2022-01-18 ENCOUNTER — VIRTUAL VISIT (OUTPATIENT)
Dept: FAMILY MEDICINE CLINIC | Age: 77
End: 2022-01-18
Payer: MEDICARE

## 2022-01-18 DIAGNOSIS — Z00.00 ROUTINE GENERAL MEDICAL EXAMINATION AT A HEALTH CARE FACILITY: Primary | ICD-10-CM

## 2022-01-18 PROCEDURE — 4040F PNEUMOC VAC/ADMIN/RCVD: CPT | Performed by: FAMILY MEDICINE

## 2022-01-18 PROCEDURE — 1123F ACP DISCUSS/DSCN MKR DOCD: CPT | Performed by: FAMILY MEDICINE

## 2022-01-18 PROCEDURE — 3017F COLORECTAL CA SCREEN DOC REV: CPT | Performed by: FAMILY MEDICINE

## 2022-01-18 PROCEDURE — G0439 PPPS, SUBSEQ VISIT: HCPCS | Performed by: FAMILY MEDICINE

## 2022-01-18 SDOH — ECONOMIC STABILITY: FOOD INSECURITY: WITHIN THE PAST 12 MONTHS, THE FOOD YOU BOUGHT JUST DIDN'T LAST AND YOU DIDN'T HAVE MONEY TO GET MORE.: NEVER TRUE

## 2022-01-18 SDOH — ECONOMIC STABILITY: FOOD INSECURITY: WITHIN THE PAST 12 MONTHS, YOU WORRIED THAT YOUR FOOD WOULD RUN OUT BEFORE YOU GOT MONEY TO BUY MORE.: NEVER TRUE

## 2022-01-18 ASSESSMENT — PATIENT HEALTH QUESTIONNAIRE - PHQ9
1. LITTLE INTEREST OR PLEASURE IN DOING THINGS: 0
SUM OF ALL RESPONSES TO PHQ QUESTIONS 1-9: 0
2. FEELING DOWN, DEPRESSED OR HOPELESS: 0
SUM OF ALL RESPONSES TO PHQ QUESTIONS 1-9: 0
SUM OF ALL RESPONSES TO PHQ QUESTIONS 1-9: 0
SUM OF ALL RESPONSES TO PHQ9 QUESTIONS 1 & 2: 0
SUM OF ALL RESPONSES TO PHQ QUESTIONS 1-9: 0

## 2022-01-18 ASSESSMENT — LIFESTYLE VARIABLES: HOW OFTEN DO YOU HAVE A DRINK CONTAINING ALCOHOL: 0

## 2022-01-18 ASSESSMENT — SOCIAL DETERMINANTS OF HEALTH (SDOH): HOW HARD IS IT FOR YOU TO PAY FOR THE VERY BASICS LIKE FOOD, HOUSING, MEDICAL CARE, AND HEATING?: NOT HARD AT ALL

## 2022-01-18 NOTE — PATIENT INSTRUCTIONS
Personalized Preventive Plan for Brandi Shea - 1/18/2022  Medicare offers a range of preventive health benefits. Some of the tests and screenings are paid in full while other may be subject to a deductible, co-insurance, and/or copay. Some of these benefits include a comprehensive review of your medical history including lifestyle, illnesses that may run in your family, and various assessments and screenings as appropriate. After reviewing your medical record and screening and assessments performed today your provider may have ordered immunizations, labs, imaging, and/or referrals for you. A list of these orders (if applicable) as well as your Preventive Care list are included within your After Visit Summary for your review. Other Preventive Recommendations:    · A preventive eye exam performed by an eye specialist is recommended every 1-2 years to screen for glaucoma; cataracts, macular degeneration, and other eye disorders. · A preventive dental visit is recommended every 6 months. · Try to get at least 150 minutes of exercise per week or 10,000 steps per day on a pedometer . · Order or download the FREE \"Exercise & Physical Activity: Your Everyday Guide\" from [employer] [facility] [employer]. Call 7-832.885.7332 or search [employer] [facility] [employer] [facility] [employer] online. · You need 5802-2589 mg of calcium and 1198-3389 IU of vitamin D per day. It is possible to meet your calcium requirement with diet alone, but a vitamin D supplement is usually necessary to meet this goal.  · When exposed to the sun, use a sunscreen that protects against both UVA and UVB radiation with an SPF of 30 or greater. Reapply every 2 to 3 hours or after sweating, drying off with a towel, or swimming. · Always wear a seat belt when traveling in a car. Always wear a helmet when riding a bicycle or motorcycle. Personalized Preventive Plan for Brandi Shea - 1/18/2022  Medicare offers a range of preventive health benefits.  Some

## 2022-02-21 NOTE — TELEPHONE ENCOUNTER
Angeline Barillas is requesting refill(s)   Last OV 01/06/2022 (pertaining to medication)  LR 07/08/2021 and 08/24/2021 (per medication requested)  Next office visit scheduled or attempted 07/05/2022

## 2022-02-22 RX ORDER — APIXABAN 5 MG/1
TABLET, FILM COATED ORAL
Qty: 180 TABLET | Refills: 1 | Status: SHIPPED | OUTPATIENT
Start: 2022-02-22 | End: 2022-07-05 | Stop reason: SDUPTHER

## 2022-02-22 RX ORDER — LABETALOL 100 MG/1
TABLET, FILM COATED ORAL
Qty: 180 TABLET | Refills: 1 | Status: SHIPPED | OUTPATIENT
Start: 2022-02-22 | End: 2022-07-05 | Stop reason: SDUPTHER

## 2022-06-23 RX ORDER — AMLODIPINE BESYLATE 10 MG/1
TABLET ORAL
Qty: 90 TABLET | Refills: 0 | Status: SHIPPED | OUTPATIENT
Start: 2022-06-23 | End: 2022-07-12 | Stop reason: SDUPTHER

## 2022-06-23 NOTE — TELEPHONE ENCOUNTER
Refill Request       Last Seen: Last Seen Department: 1/18/2022  Last Seen by PCP: 1/18/2022    Last Written: 08/24/2021    Next Appointment:   Future Appointments   Date Time Provider Aristides Lagos   7/5/2022 10:30 AM DO ALETEHA [place] - [facility]       Future appointment scheduled      Requested Prescriptions     Pending Prescriptions Disp Refills    amLODIPine (NORVASC) 10 MG tablet 90 tablet 3     Sig: TAKE 1 TABLET EVERY DAY

## 2022-06-23 NOTE — TELEPHONE ENCOUNTER
Jamel [facility] called requesting a med refill for Amlodipine 10 mg.    Bary Najjar [phone] (home)    is requesting refill(s) of medication Amlodipine 10mg to [address] 01/06/22 (pertaining to medication)   Last refill 08/24/2021 (per medication requested)  Next office visit scheduled or attempted YES  Date 07/05/2022

## 2022-07-05 ENCOUNTER — OFFICE VISIT (OUTPATIENT)
Dept: FAMILY MEDICINE CLINIC | Age: 77
End: 2022-07-05
Payer: MEDICARE

## 2022-07-05 VITALS
WEIGHT: 205.4 LBS | HEART RATE: 71 BPM | BODY MASS INDEX: 35.07 KG/M2 | SYSTOLIC BLOOD PRESSURE: 144 MMHG | OXYGEN SATURATION: 97 % | HEIGHT: 64 IN | DIASTOLIC BLOOD PRESSURE: 78 MMHG

## 2022-07-05 DIAGNOSIS — I10 ESSENTIAL HYPERTENSION: Primary | ICD-10-CM

## 2022-07-05 DIAGNOSIS — I26.99 PE (PULMONARY THROMBOEMBOLISM) (HCC): ICD-10-CM

## 2022-07-05 PROCEDURE — 99213 OFFICE O/P EST LOW 20 MIN: CPT | Performed by: FAMILY MEDICINE

## 2022-07-05 PROCEDURE — 1090F PRES/ABSN URINE INCON ASSESS: CPT | Performed by: FAMILY MEDICINE

## 2022-07-05 PROCEDURE — G8399 PT W/DXA RESULTS DOCUMENT: HCPCS | Performed by: FAMILY MEDICINE

## 2022-07-05 PROCEDURE — 3017F COLORECTAL CA SCREEN DOC REV: CPT | Performed by: FAMILY MEDICINE

## 2022-07-05 PROCEDURE — 1036F TOBACCO NON-USER: CPT | Performed by: FAMILY MEDICINE

## 2022-07-05 PROCEDURE — G8417 CALC BMI ABV UP PARAM F/U: HCPCS | Performed by: FAMILY MEDICINE

## 2022-07-05 PROCEDURE — 1123F ACP DISCUSS/DSCN MKR DOCD: CPT | Performed by: FAMILY MEDICINE

## 2022-07-05 PROCEDURE — G8427 DOCREV CUR MEDS BY ELIG CLIN: HCPCS | Performed by: FAMILY MEDICINE

## 2022-07-05 RX ORDER — LABETALOL 100 MG/1
TABLET, FILM COATED ORAL
Qty: 180 TABLET | Refills: 1 | Status: SHIPPED | OUTPATIENT
Start: 2022-07-05

## 2022-07-05 ASSESSMENT — ENCOUNTER SYMPTOMS
COUGH: 0
SINUS PRESSURE: 0
ABDOMINAL PAIN: 0
EYE REDNESS: 0
DIARRHEA: 0
WHEEZING: 0
CHEST TIGHTNESS: 0
BLOOD IN STOOL: 0
EYE PAIN: 0
EYE DISCHARGE: 0
VOMITING: 0
SHORTNESS OF BREATH: 0
CONSTIPATION: 0
RHINORRHEA: 0
NAUSEA: 0

## 2022-07-05 ASSESSMENT — PATIENT HEALTH QUESTIONNAIRE - PHQ9
5. POOR APPETITE OR OVEREATING: 0
10. IF YOU CHECKED OFF ANY PROBLEMS, HOW DIFFICULT HAVE THESE PROBLEMS MADE IT FOR YOU TO DO YOUR WORK, TAKE CARE OF THINGS AT HOME, OR GET ALONG WITH OTHER PEOPLE: 0
1. LITTLE INTEREST OR PLEASURE IN DOING THINGS: 0
SUM OF ALL RESPONSES TO PHQ9 QUESTIONS 1 & 2: 0
9. THOUGHTS THAT YOU WOULD BE BETTER OFF DEAD, OR OF HURTING YOURSELF: 0
6. FEELING BAD ABOUT YOURSELF - OR THAT YOU ARE A FAILURE OR HAVE LET YOURSELF OR YOUR FAMILY DOWN: 0
3. TROUBLE FALLING OR STAYING ASLEEP: 0
4. FEELING TIRED OR HAVING LITTLE ENERGY: 0
2. FEELING DOWN, DEPRESSED OR HOPELESS: 0
SUM OF ALL RESPONSES TO PHQ QUESTIONS 1-9: 0
8. MOVING OR SPEAKING SO SLOWLY THAT OTHER PEOPLE COULD HAVE NOTICED. OR THE OPPOSITE, BEING SO FIGETY OR RESTLESS THAT YOU HAVE BEEN MOVING AROUND A LOT MORE THAN USUAL: 0
SUM OF ALL RESPONSES TO PHQ QUESTIONS 1-9: 0
7. TROUBLE CONCENTRATING ON THINGS, SUCH AS READING THE NEWSPAPER OR WATCHING TELEVISION: 0

## 2022-07-05 NOTE — PATIENT INSTRUCTIONS

## 2022-07-05 NOTE — PROGRESS NOTES
Subjective:      Patient ID: Karri Branch is a 78 y.o. female. HPI  Chief Complaint   Patient presents with    Hypertension     Patient is here for a 6 month follow up, she is fasting for blood work. Here for htn checkup . Takes meds daily  On eliquis for PE- no problems  Denies chest pain, SOB or syncope  No excessive bruising or bleeding from eliquis  Not exercising, no weight loss  Karri Branch is a 78 y.o. female with the following history as recorded in [facility]:  Patient Active Problem List    Diagnosis Date Noted    Pulmonary HTN ([facility] [address] 09/04/2020    PE (pulmonary thromboembolism) ([facility] [address] 04/07/2015    Essential hypertension 12/09/2014    Obesity (BMI 35.0-39.9 without comorbidity) 12/09/2014     Current Outpatient Medications   Medication Sig Dispense Refill    amLODIPine (NORVASC) 10 MG tablet TAKE 1 TABLET EVERY DAY 90 tablet 0    ELIQUIS 5 MG TABS tablet TAKE 1 TABLET TWICE DAILY 180 tablet 1    labetalol (NORMODYNE) 100 MG tablet TAKE 1 TABLET TWICE DAILY 180 tablet 1    aspirin 81 MG tablet Take 1 tablet by mouth daily 90 tablet 3     No current facility-administered medications for this visit. Allergies: Patient has no known allergies. Past Medical History:   Diagnosis Date    Hx of blood clots 2015    Hypertension      Past Surgical History:   Procedure Laterality Date    COLONOSCOPY  2006    COLONOSCOPY  4/19/16    colon polyp    EYE SURGERY      cataracts bilateral    HYSTERECTOMY (CERVIX STATUS UNKNOWN)       Family History   Problem Relation Age of Onset    Cancer Mother     Breast Cancer Mother     Coronary Art Dis Father      Social History     Tobacco Use    Smoking status: Never Smoker    Smokeless tobacco: Never Used   Substance Use Topics    Alcohol use:  Yes     Alcohol/week: 0.0 standard drinks     Comment: rarely     Vitals:    07/05/22 1040 07/05/22 1043   BP: (!) 142/80 (!) 144/78   Pulse: 71    SpO2: 97%    Weight: 205 lb 6.4 oz (93.2 kg)    Height: 5' 4\" (1.626 m)      Body mass index is 35.26 kg/m². Wt Readings from Last 3 Encounters:   07/05/22 205 lb 6.4 oz (93.2 kg)   01/06/22 206 lb 3.2 oz (93.5 kg)   06/25/21 214 lb (97.1 kg)     BP Readings from Last 3 Encounters:   07/05/22 (!) 144/78   01/06/22 138/72   06/25/21 (!) 140/90        Review of Systems   Constitutional: Negative for chills, fatigue, fever and unexpected weight change. HENT: Negative for ear discharge, ear pain, hearing loss, rhinorrhea, sinus pressure and tinnitus. Eyes: Negative for pain, discharge, redness and visual disturbance. Respiratory: Negative for cough, chest tightness, shortness of breath and wheezing. Cardiovascular: Negative for chest pain and palpitations. Gastrointestinal: Negative for abdominal pain, blood in stool, constipation, diarrhea, nausea and vomiting. Genitourinary: Negative for difficulty urinating, dysuria and hematuria. Musculoskeletal: Negative for arthralgias and joint swelling. Neurological: Negative for dizziness, seizures, syncope and headaches. Hematological: Negative for adenopathy. Does not bruise/bleed easily. Psychiatric/Behavioral: Negative for dysphoric mood and sleep disturbance. The patient is not nervous/anxious. Objective:   Physical Exam  Constitutional:       General: She is not in acute distress. Appearance: Normal appearance. She is well-developed. She is obese. She is not ill-appearing, toxic-appearing or diaphoretic. HENT:      Head: Normocephalic and atraumatic. Right Ear: External ear normal.      Left Ear: External ear normal.   Neck:      Thyroid: No thyroid mass or thyromegaly. Vascular: Normal carotid pulses. No carotid bruit, hepatojugular reflux or JVD. Trachea: Trachea normal. No tracheal deviation. Cardiovascular:      Rate and Rhythm: Normal rate and regular rhythm. Pulses: Normal pulses.       Heart sounds: Normal heart sounds, S1 normal and S2 normal. No murmur labetalol (NORMODYNE) 100 MG tablet     Sig: TAKE 1 TABLET TWICE DAILY     Dispense:  180 tablet     Refill:  1             MARY PENNINGTON DO

## 2022-07-12 RX ORDER — AMLODIPINE BESYLATE 10 MG/1
TABLET ORAL
Qty: 90 TABLET | Refills: 0 | Status: SHIPPED | OUTPATIENT
Start: 2022-07-12 | End: 2022-09-12 | Stop reason: SDUPTHER

## 2022-07-12 NOTE — TELEPHONE ENCOUNTER
Refill Request     CONFIRM preferrred pharmacy with the patient. If Mail Order Rx - Pend for 90 day refill.       Last Seen: Last Seen Department: 7/5/2022  Last Seen by PCP: 7/5/2022    Last Written:   1) amlodipine - 06/23/2022 however was sent to [facility], she uses to [address] [place] [address] Dr now     Next Appointment:   Future Appointments   Date Time Provider Aristides Lagos   9/12/2022 11:30 AM Augustin Arriaga MD [facility]           Requested Prescriptions     Pending Prescriptions Disp Refills    amLODIPine (NORVASC) 10 MG tablet 90 tablet 0     Sig: TAKE 1 TABLET EVERY DAY

## 2022-08-17 ENCOUNTER — TELEPHONE (OUTPATIENT)
Dept: FAMILY MEDICINE CLINIC | Age: 77
End: 2022-08-17

## 2022-08-17 NOTE — TELEPHONE ENCOUNTER
Patient has an appointment with Dr. Park Samuel in September. I returned her call but could not leave a voice message. Voice mailbox is full.

## 2022-09-12 ENCOUNTER — OFFICE VISIT (OUTPATIENT)
Dept: INTERNAL MEDICINE CLINIC | Age: 77
End: 2022-09-12
Payer: MEDICARE

## 2022-09-12 VITALS
BODY MASS INDEX: 34.31 KG/M2 | DIASTOLIC BLOOD PRESSURE: 64 MMHG | SYSTOLIC BLOOD PRESSURE: 132 MMHG | OXYGEN SATURATION: 97 % | HEIGHT: 64 IN | HEART RATE: 66 BPM | WEIGHT: 201 LBS

## 2022-09-12 DIAGNOSIS — I26.99 PE (PULMONARY THROMBOEMBOLISM) (HCC): ICD-10-CM

## 2022-09-12 DIAGNOSIS — E66.9 OBESITY (BMI 35.0-39.9 WITHOUT COMORBIDITY): ICD-10-CM

## 2022-09-12 DIAGNOSIS — I10 ESSENTIAL HYPERTENSION: Primary | ICD-10-CM

## 2022-09-12 PROCEDURE — 99213 OFFICE O/P EST LOW 20 MIN: CPT | Performed by: INTERNAL MEDICINE

## 2022-09-12 PROCEDURE — 1036F TOBACCO NON-USER: CPT | Performed by: INTERNAL MEDICINE

## 2022-09-12 PROCEDURE — 1090F PRES/ABSN URINE INCON ASSESS: CPT | Performed by: INTERNAL MEDICINE

## 2022-09-12 PROCEDURE — G8399 PT W/DXA RESULTS DOCUMENT: HCPCS | Performed by: INTERNAL MEDICINE

## 2022-09-12 PROCEDURE — 3017F COLORECTAL CA SCREEN DOC REV: CPT | Performed by: INTERNAL MEDICINE

## 2022-09-12 PROCEDURE — G8417 CALC BMI ABV UP PARAM F/U: HCPCS | Performed by: INTERNAL MEDICINE

## 2022-09-12 PROCEDURE — G8427 DOCREV CUR MEDS BY ELIG CLIN: HCPCS | Performed by: INTERNAL MEDICINE

## 2022-09-12 PROCEDURE — 1123F ACP DISCUSS/DSCN MKR DOCD: CPT | Performed by: INTERNAL MEDICINE

## 2022-09-12 RX ORDER — AMLODIPINE BESYLATE 10 MG/1
TABLET ORAL
Qty: 90 TABLET | Refills: 1 | Status: SHIPPED | OUTPATIENT
Start: 2022-09-12

## 2022-09-12 NOTE — PROGRESS NOTES
Giovanni Pierce  YOB: 1945    Date of Service:  9/12/2022    Chief Complaint:   Giovanni Pierce is a 78 y.o. female who presents for   Chief Complaint   Patient presents with    New Patient       Assessment/Plan:    Nathaly Alvarenga was seen today for new patient. Diagnoses and all orders for this visit:    Essential hypertension  -     amLODIPine (NORVASC) 10 MG tablet; TAKE 1 TABLET EVERY DAY    PE (pulmonary thromboembolism) (HCC)    Obesity (BMI 35.0-39.9 without comorbidity)  Goals:   -Eat small amounts of food 4-5 times daily  -Avoid high fat and high sugar foods  -Include protein with all meals and snacks  -Avoid carbonation and caffeine  -Avoid calorie containing beverages  -Increase physical activity as tolerated      Return Jan 18 at 9:50 Fasting Medicare Wellness and f/u. HPI: Here for Annual Physical and Follow up. Hypertension:  Home blood pressure monitoring: Yes - 132/80 on amlodipine 10 mg daily and labetalol 100 mg bid. She is adherent to a low sodium diet. Patient denies chest pain, shortness of breath, headache, lightheadedness, blurred vision, peripheral edema, palpitations, dry cough, and fatigue. Antihypertensive medication side effects: no medication side effects noted. Use of agents associated with hypertension: none. PE: stable on Eliquis 5 mg twice a day and told by pulmonologist to stay on for life.                                          Lab Results   Component Value Date    LABMICR YES 09/01/2020     Lab Results   Component Value Date     01/06/2022    K 4.6 01/06/2022     01/06/2022    CO2 22 01/06/2022    BUN 19 01/06/2022    CREATININE 0.9 01/06/2022    GLUCOSE 94 01/06/2022    CALCIUM 9.6 01/06/2022     Lab Results   Component Value Date/Time    CHOL 165 01/06/2022 09:05 AM    TRIG 71 01/06/2022 09:05 AM    HDL 47 01/06/2022 09:05 AM    LDLCALC 104 01/06/2022 09:05 AM     Lab Results   Component Value Date    ALT 9 (L) 01/06/2022    AST 12 (L) 01/06/2022     Lab Results   Component Value Date    TSH 3.55 04/11/2019    TSH 2.61 04/12/2018     Lab Results   Component Value Date    WBC 6.8 12/17/2020    HGB 14.7 12/17/2020    HCT 45.9 12/17/2020    MCV 80.9 12/17/2020     12/17/2020     Lab Results   Component Value Date    INR 1.13 09/01/2020    INR 1.09 04/06/2015      No results found for: PSA   No results found for: LABURIC     Wt Readings from Last 3 Encounters:   09/12/22 201 lb (91.2 kg)   07/05/22 205 lb 6.4 oz (93.2 kg)   01/06/22 206 lb 3.2 oz (93.5 kg)     BP Readings from Last 3 Encounters:   09/12/22 132/64   07/05/22 (!) 144/78   01/06/22 138/72       Patient Active Problem List   Diagnosis    Essential hypertension    Obesity (BMI 35.0-39.9 without comorbidity)    PE (pulmonary thromboembolism) (HCC)    Pulmonary HTN (HCC)       No Known Allergies  Outpatient Medications Marked as Taking for the 9/12/22 encounter (Office Visit) with Peri Toribio MD   Medication Sig Dispense Refill    amLODIPine (NORVASC) 10 MG tablet TAKE 1 TABLET EVERY DAY 90 tablet 0    apixaban (ELIQUIS) 5 MG TABS tablet TAKE 1 TABLET TWICE DAILY 180 tablet 1    labetalol (NORMODYNE) 100 MG tablet TAKE 1 TABLET TWICE DAILY 180 tablet 1    aspirin 81 MG tablet Take 1 tablet by mouth daily 90 tablet 3       Past Medical History:   Diagnosis Date    Hx of blood clots 2015    Hypertension      Past Surgical History:   Procedure Laterality Date    COLONOSCOPY  2006    COLONOSCOPY  4/19/16    colon polyp    EYE SURGERY      cataracts bilateral    HYSTERECTOMY (CERVIX STATUS UNKNOWN)       Family History   Problem Relation Age of Onset    Cancer Mother     Breast Cancer Mother     Coronary Art Dis Father      Social History     Socioeconomic History    Marital status:       Spouse name: Not on file    Number of children: Not on file    Years of education: Not on file    Highest education level: Not on file   Occupational History    Not on file   Tobacco Use Smoking status: Never    Smokeless tobacco: Never   Substance and Sexual Activity    Alcohol use: Yes     Alcohol/week: 0.0 standard drinks     Comment: rarely    Drug use: No    Sexual activity: Not on file   Other Topics Concern    Not on file   Social History Narrative    Not on file     Social Determinants of Health     Financial Resource Strain: Low Risk     Difficulty of Paying Living Expenses: Not hard at all   Food Insecurity: No Food Insecurity    Worried About Running Out of Food in the Last Year: Never true    Ran Out of Food in the Last Year: Never true   Transportation Needs: Not on file   Physical Activity: Not on file   Stress: Not on file   Social Connections: Not on file   Intimate Partner Violence: Not on file   Housing Stability: Not on file       Review of Systems:  A comprehensive review of systems was negative except for what was noted in the HPI. Physical Exam:   Vitals:    09/12/22 1108   BP: 132/64   Pulse: 66   SpO2: 97%   Weight: 201 lb (91.2 kg)   Height: 5' 4\" (1.626 m)     Body mass index is 34.5 kg/m². Constitutional: She is oriented to person, place, and time. She appears well-developed and well-nourished. No distress. HEENT:   Head: Normocephalic and atraumatic. Right Ear: Tympanic membrane, external ear and ear canal normal.   Left Ear: Tympanic membrane, external ear and ear canal normal.   Mouth/Throat: Oropharynx is clear and moist, and mucous membranes are normal.  There is no cervical adenopathy. Eyes: Conjunctivae and extraocular motions are normal. Pupils are equal, round, and reactive to light. Neck: Supple. No JVD present. Carotid bruit is not present. No mass and no thyromegaly present. Cardiovascular: Normal rate, regular rhythm, normal heart sounds and intact distal pulses. Pulmonary/Chest: Effort normal and breath sounds normal. No respiratory distress. She has no wheezes, rhonchi or rales. Abdominal: Soft, non-tender.  Bowel sounds and aorta are normal. She exhibits no organomegaly, mass or bruit. Musculoskeletal: Normal range of motion, no synovitis. She exhibits no edema. Neurological: She is alert and oriented to person, place, and time. She has normal reflexes. No cranial nerve deficit. Coordination normal.   Skin: Skin is warm and dry. There is no rash or erythema. No suspicious lesions noted.        Preventive Care:  Health Maintenance   Topic Date Due    COVID-19 Vaccine (4 - Booster for Moderna series) 03/10/2022    Flu vaccine (1) 09/01/2022    Shingles vaccine (1 of 2) 01/06/2023 (Originally 4/21/1997)    Annual Wellness Visit (AWV)  01/19/2023    Depression Screen  07/05/2023    Colorectal Cancer Screen  04/19/2026    Lipids  01/06/2027    DTaP/Tdap/Td vaccine (3 - Td or Tdap) 10/13/2028    DEXA (modify frequency per FRAX score)  Completed    Pneumococcal 65+ years Vaccine  Completed    Hepatitis C screen  Completed    Hepatitis A vaccine  Aged Out    Hepatitis B vaccine  Aged Out    Hib vaccine  Aged Out    Meningococcal (ACWY) vaccine  Aged Out        Recommendations for [employer] Due: see orders and patient instructions/AVS.

## 2023-01-18 ENCOUNTER — TELEPHONE (OUTPATIENT)
Dept: INTERNAL MEDICINE CLINIC | Age: 78
End: 2023-01-18

## 2023-01-18 ENCOUNTER — OFFICE VISIT (OUTPATIENT)
Dept: INTERNAL MEDICINE CLINIC | Age: 78
End: 2023-01-18

## 2023-01-18 VITALS
DIASTOLIC BLOOD PRESSURE: 80 MMHG | BODY MASS INDEX: 34.66 KG/M2 | OXYGEN SATURATION: 98 % | SYSTOLIC BLOOD PRESSURE: 144 MMHG | HEART RATE: 69 BPM | HEIGHT: 64 IN | WEIGHT: 203 LBS

## 2023-01-18 DIAGNOSIS — Z00.00 MEDICARE ANNUAL WELLNESS VISIT, SUBSEQUENT: Primary | ICD-10-CM

## 2023-01-18 DIAGNOSIS — I27.20 PULMONARY HYPERTENSION (HCC): ICD-10-CM

## 2023-01-18 DIAGNOSIS — F03.B0 MODERATE DEMENTIA WITHOUT BEHAVIORAL DISTURBANCE, PSYCHOTIC DISTURBANCE, MOOD DISTURBANCE, OR ANXIETY, UNSPECIFIED DEMENTIA TYPE: ICD-10-CM

## 2023-01-18 DIAGNOSIS — I26.99 PE (PULMONARY THROMBOEMBOLISM) (HCC): ICD-10-CM

## 2023-01-18 DIAGNOSIS — I10 ESSENTIAL HYPERTENSION: ICD-10-CM

## 2023-01-18 PROBLEM — F03.90 DEMENTIA WITHOUT BEHAVIORAL DISTURBANCE, PSYCHOTIC DISTURBANCE, MOOD DISTURBANCE, OR ANXIETY (HCC): Status: ACTIVE | Noted: 2023-01-18

## 2023-01-18 RX ORDER — LABETALOL 200 MG/1
200 TABLET, FILM COATED ORAL 2 TIMES DAILY
Qty: 180 TABLET | Refills: 0 | Status: SHIPPED | OUTPATIENT
Start: 2023-01-18

## 2023-01-18 RX ORDER — DONEPEZIL HYDROCHLORIDE 10 MG/1
TABLET, FILM COATED ORAL
Qty: 90 TABLET | Refills: 0 | Status: SHIPPED | OUTPATIENT
Start: 2023-01-18

## 2023-01-18 RX ORDER — AMLODIPINE BESYLATE 10 MG/1
TABLET ORAL
Qty: 90 TABLET | Refills: 3 | Status: SHIPPED | OUTPATIENT
Start: 2023-01-18

## 2023-01-18 ASSESSMENT — PATIENT HEALTH QUESTIONNAIRE - PHQ9
2. FEELING DOWN, DEPRESSED OR HOPELESS: 0
SUM OF ALL RESPONSES TO PHQ QUESTIONS 1-9: 0
SUM OF ALL RESPONSES TO PHQ9 QUESTIONS 1 & 2: 0
SUM OF ALL RESPONSES TO PHQ QUESTIONS 1-9: 0
SUM OF ALL RESPONSES TO PHQ QUESTIONS 1-9: 0
1. LITTLE INTEREST OR PLEASURE IN DOING THINGS: 0
SUM OF ALL RESPONSES TO PHQ QUESTIONS 1-9: 0

## 2023-01-18 ASSESSMENT — LIFESTYLE VARIABLES
HOW MANY STANDARD DRINKS CONTAINING ALCOHOL DO YOU HAVE ON A TYPICAL DAY: 1 OR 2
HOW OFTEN DO YOU HAVE A DRINK CONTAINING ALCOHOL: MONTHLY OR LESS

## 2023-01-18 NOTE — TELEPHONE ENCOUNTER
Per Dr. Melvin Joseph request, reached out to patient's brother Brittny Pugh. OK per HIPAA. Advised him of patient's next appointment date and that Dr. Carolina Farnsworth would like a family member to accompany her that visit. Patient has some memory loss and Dr. Carolina Farnsworth wants to make sure that patient picks up and is taking all medications. Patient's brother voiced understanding and will come to next visit.

## 2023-01-18 NOTE — PATIENT INSTRUCTIONS
Bring all your medication bottles with you and a family member with you on your next visit due to your poor memory. Schedule with eye doctor. Go to the pharmacy to get your Shingles vaccine    Increase labetolol 100 mg 1 1/2 twice a day and if blood pressure still above 140/90, increase to 2 pills twice a day and the new dose in the mail will be 200 mg pills to take 1 twice a day. Start Donepezil (Aricept) 10 mg 1/2 pill daily for 1-2 weeks, if no side effects, you can increase to 1 pill daily. This is to help reduce further memory loss. Learning About Dental Care for Older Adults  Dental care for older adults: Overview  Dental care for older people is much the same as for younger adults. But older adults do have concerns that younger adults do not. Older adults may have problems with gum disease and decay on the roots of their teeth. They may need missing teeth replaced or broken fillings fixed. Or they may have dentures that need to be cared for. Some older adults may have trouble holding a toothbrush. You can help remind the person you are caring for to brush and floss their teeth or to clean their dentures. In some cases, you may need to do the brushing and other dental care tasks. People who have trouble using their hands or who have dementia may need this extra help. How can you help with dental care? Normal dental care  To keep the teeth and gums healthy:  Brush the teeth with fluoride toothpaste twice a day--in the morning and at night--and floss at least once a day. Plaque can quickly build up on the teeth of older adults. Watch for the signs of gum disease. These signs include gums that bleed after brushing or after eating hard foods, such as apples. See a dentist regularly. Many experts recommend checkups every 6 months. Keep the dentist up to date on any new medications the person is taking.   Encourage a balanced diet that includes whole grains, vegetables, and fruits, and that is low in saturated fat and sodium. Encourage the person you're caring for not to use tobacco products. They can affect dental and general health. Many older adults have a fixed income and feel that they can't afford dental care. But most towns and cities have programs in which dentists help older adults by lowering fees. Contact your area's public health offices or  for information about dental care in your area. Using a toothbrush  Older adults with arthritis sometimes have trouble brushing their teeth because they can't easily hold the toothbrush. Their hands and fingers may be stiff, painful, or weak. If this is the case, you can: Offer an electric toothbrush. Enlarge the handle of a non-electric toothbrush by wrapping a sponge, an elastic bandage, or adhesive tape around it. Push the toothbrush handle through a ball made of rubber or soft foam.  Make the handle longer and thicker by taping Popsicle sticks or tongue depressors to it. You may also be able to buy special toothbrushes, toothpaste dispensers, and floss holders. Your doctor may recommend a soft-bristle toothbrush if the person you care for bleeds easily. Bleeding can happen because of a health problem or from certain medicines. A toothpaste for sensitive teeth may help if the person you care for has sensitive teeth. How do you brush and floss someone's teeth? If the person you are caring for has a hard time cleaning their teeth on their own, you may need to brush and floss their teeth for them. It may be easiest to have the person sit and face away from you, and to sit or stand behind them. That way you can steady their head against your arm as you reach around to floss and brush their teeth. Choose a place that has good lighting and is comfortable for both of you. Before you begin, gather your supplies. You will need gloves, floss, a toothbrush, and a container to hold water if you are not near a sink.  Wash and dry your hands well and put on gloves. Start by flossing:  Gently work a piece of floss between each of the teeth toward the gums. A plastic flossing tool may make this easier, and they are available at most [facility]. Curve the floss around each tooth into a U-shape and gently slide it under the gum line. Move the floss firmly up and down several times to scrape off the plaque. After you've finished flossing, throw away the used floss and begin brushing:  Wet the brush and apply toothpaste. Place the brush at a 45-degree angle where the teeth meet the gums. Press firmly, and move the brush in small circles over the surface of the teeth. Be careful not to brush too hard. Vigorous brushing can make the gums pull away from the teeth and can scratch the tooth enamel. Brush all surfaces of the teeth, on the tongue side and on the cheek side. Pay special attention to the front teeth and all surfaces of the back teeth. Brush chewing surfaces with short back-and-forth strokes. After you've finished, help the person rinse the remaining toothpaste from their mouth. Where can you learn more? Go to http://www.woods.com/ and enter F944 to learn more about \"Learning About Dental Care for Older Adults. \"  Current as of: June 16, 2022               Content Version: 13.5  © 0073-3339 Healthwise, Incorporated. Care instructions adapted under license by [facility] ([facility]). If you have questions about a medical condition or this instruction, always ask your healthcare professional. [place] [address] any warranty or liability for your use of this information. Learning About Vision Tests  What are vision tests? The four most common vision tests are visual acuity tests, refraction, visual field tests, and color vision tests. Visual acuity (sharpness) tests  These tests are used: To see if you need glasses or contact lenses. To monitor an eye problem. To check an eye injury.   Visual acuity tests are done as part of routine exams. You may also have this test when you get your 's license or apply for some types of jobs. Visual field tests  These tests are used: To check for vision loss in any area of your range of vision. To screen for certain eye diseases. To look for nerve damage after a stroke, head injury, or other problem that could reduce blood flow to the brain. Refraction and color tests  A refraction test is done to find the right prescription for glasses and contact lenses. A color vision test is done to check for color blindness. Color vision is often tested as part of a routine exam. You may also have this test when you apply for a job where recognizing different colors is important, such as , electronics, or the [employer]. How are vision tests done? Visual acuity test   You cover one eye at a time. You read aloud from a wall chart across the room. You read aloud from a small card that you hold in your hand. Refraction   You look into a special device. The device puts lenses of different strengths in front of each eye to see how strong your glasses or contact lenses need to be. Visual field tests   Your doctor may have you look through special machines. Or your doctor may simply have you stare straight ahead while they move a finger into and out of your field of vision. Color vision test   You look at pieces of printed test patterns in various colors. You say what number or symbol you see. Your doctor may have you trace the number or symbol using a pointer. How do these tests feel? There is very little chance of having a problem from this test. If dilating drops are used for a vision test, they may make the eyes sting and cause a medicine taste in the mouth. Follow-up care is a key part of your treatment and safety. Be sure to make and go to all appointments, and call your doctor if you are having problems.  It's also a good idea to know your test results and keep a list of the medicines you take. Where can you learn more? Go to http://www.mitchell.com/ and enter G551 to learn more about \"Learning About Vision Tests. \"  Current as of: October 12, 2022               Content Version: 13.5  © 1349-1516 Healthwise, Incorporated. Care instructions adapted under license by [facility] ([facility]). If you have questions about a medical condition or this instruction, always ask your healthcare professional. [place] [address] any warranty or liability for your use of this information. Advance Directives: Care Instructions  Overview  An advance directive is a legal way to state your wishes at the end of your life. It tells your family and your doctor what to do if you can't say what you want. There are two main types of advance directives. You can change them any time your wishes change. Living will. This form tells your family and your doctor your wishes about life support and other treatment. The form is also called a declaration. Medical power of . This form lets you name a person to make treatment decisions for you when you can't speak for yourself. This person is called a health care agent (health care proxy, health care surrogate). The form is also called a durable power of  for health care. If you do not have an advance directive, decisions about your medical care may be made by a family member, or by a doctor or a  who doesn't know you. It may help to think of an advance directive as a gift to the people who care for you. If you have one, they won't have to make tough decisions by themselves. For more information, including forms for your state, see the [address] website (www.caringinfo.org/planning/advance-directives/). Follow-up care is a key part of your treatment and safety. Be sure to make and go to all appointments, and call your doctor if you are having problems.  It's also a good idea to know your test results and keep a list of the medicines you take. What should you include in an advance directive? Many states have a unique advance directive form. (It may ask you to address specific issues.) Or you might use a universal form that's approved by many states. If your form doesn't tell you what to address, it may be hard to know what to include in your advance directive. Use the questions below to help you get started. Who do you want to make decisions about your medical care if you are not able to? What life-support measures do you want if you have a serious illness that gets worse over time or can't be cured? What are you most afraid of that might happen? (Maybe you're afraid of having pain, losing your independence, or being kept alive by machines.)  Where would you prefer to die? (Your home? A hospital? A nursing home?)  Do you want to donate your organs when you die? Do you want certain [employer]ist practices performed before you die? When should you call for help? Be sure to contact your doctor if you have any questions. Where can you learn more? Go to http://115 network disks.mitchell.com/ and enter R264 to learn more about \"Advance Directives: Care Instructions. \"  Current as of: June 16, 2022               Content Version: 13.5  © 8762-2275 Healthwise, Incorporated. Care instructions adapted under license by [facility] [address] If you have questions about a medical condition or this instruction, always ask your healthcare professional. [place] [address] any warranty or liability for your use of this information. Personalized Preventive Plan for Gil Jamarcus - 1/18/2023  Medicare offers a range of preventive health benefits. Some of the tests and screenings are paid in full while other may be subject to a deductible, co-insurance, and/or copay.     Some of these benefits include a comprehensive review of your medical history including lifestyle, illnesses that may run in your family, and various assessments and screenings as appropriate.    After reviewing your medical record and screening and assessments performed today your provider may have ordered immunizations, labs, imaging, and/or referrals for you.  A list of these orders (if applicable) as well as your Preventive Care list are included within your After Visit Summary for your review.    Other Preventive Recommendations:    A preventive eye exam performed by an eye specialist is recommended every 1-2 years to screen for glaucoma; cataracts, macular degeneration, and other eye disorders.  A preventive dental visit is recommended every 6 months.  Try to get at least 150 minutes of exercise per week or 10,000 steps per day on a pedometer .  Order or download the FREE \"Exercise & Physical Activity: Your Everyday Guide\" from [employer]. Call 1-746.659.9632 or search [employer] online.  You need 0345-7863 mg of calcium and 2571-8394 IU of vitamin D per day. It is possible to meet your calcium requirement with diet alone, but a vitamin D supplement is usually necessary to meet this goal.  When exposed to the sun, use a sunscreen that protects against both UVA and UVB radiation with an SPF of 30 or greater. Reapply every 2 to 3 hours or after sweating, drying off with a towel, or swimming.  Always wear a seat belt when traveling in a car. Always wear a helmet when riding a bicycle or motorcycle.

## 2023-01-18 NOTE — PROGRESS NOTES
Medicare Annual Wellness Visit    Gil Ricketts is here for Medicare AWV and Hypertension    Assessment & Plan   Medicare annual wellness visit, subsequent      Recommendations for Preventive Services Due: see orders and patient instructions/AVS.  Recommended screening schedule for the next 5-10 years is provided to the patient in written form: see Patient Instructions/AVS.     Return for Medicare Annual Wellness Visit in 1 year. Subjective       Patient's complete Health Risk Assessment and screening values have been reviewed and are found in Flowsheets. The following problems were reviewed today and where indicated follow up appointments were made and/or referrals ordered. Positive Risk Factor Screenings with Interventions:       Cognitive: Words recalled: 1 Word Recalled (tried twice, one word recalled each time)           Total Score Interpretation: Abnormal Mini-Cog      Interventions: Will start Aricept and notify family of concern with patient living alone. Weight and Activity:  Physical Activity: Insufficiently Active    Days of Exercise per Week: 2 days    Minutes of Exercise per Session: 20 min     On average, how many days per week do you engage in moderate to strenuous exercise (like a brisk walk)?: 2 days  Have you lost any weight without trying in the past 3 months?: No  Body mass index: (!) 34.84    Obesity Interventions:  Patient comments: she does stay active and drives around          Dentist Screen:  Have you seen the dentist within the past year?: (!) No (needs to schedule)    Intervention:  Advised to schedule with their dentist     Vision Screen:  Do you have difficulty driving, watching TV, or doing any of your daily activities because of your eyesight?: No  Have you had an eye exam within the past year?: (!) No (needs to schedule)  No results found.     Interventions:   Patient encouraged to make appointment with their eye specialist                  Objective   Vitals: 01/18/23 0927   BP: (!) 144/80   Pulse: 69   SpO2: 98%   Weight: 203 lb (92.1 kg)   Height: 5' 4\" (1.626 m)      Body mass index is 34.84 kg/m². No Known Allergies  Prior to Visit Medications    Medication Sig Taking?  Authorizing Provider   amLODIPine (NORVASC) 10 MG tablet TAKE 1 TABLET EVERY DAY Yes Nurys Arriaga MD   apixaban (ELIQUIS) 5 MG TABS tablet TAKE 1 TABLET TWICE DAILY Yes Luz Thurston DO   labetalol (NORMODYNE) 100 MG tablet TAKE 1 TABLET TWICE DAILY Yes Luz Thurston, DO   aspirin 81 MG tablet Take 1 tablet by mouth daily Yes Orin Diaz MD       [facility] (Including outside providers/suppliers regularly involved in providing care):   Patient Care Team:  Kristie Pascual MD as PCP - General (Internal Medicine)  Jeison Lafleur MD as PCP - Hematology/Oncology (Medical Oncology)  Kristie Pascual MD as PCP - [facility] Empaneled Provider     Reviewed and updated this visit:  Tobacco  Allergies  Meds  Problems  Med Hx  Surg Hx  Soc Hx  Fam Hx

## 2023-01-18 NOTE — PROGRESS NOTES
Cristo Montiel  YOB: 1945    Date of Service:  1/18/2023    Chief Complaint:      Chief Complaint   Patient presents with    Medicare AWV    Hypertension       Assessment/Plan:  Janneth Mccabe was seen today for medicare awv and hypertension. Diagnoses and all orders for this visit:    Medicare annual wellness visit, subsequent    Moderate dementia without behavioral disturbance, psychotic disturbance, mood disturbance, or anxiety, unspecified dementia type  Start donepezil (ARICEPT) 10 MG tablet; Start with 1/2 pill daily for 1 week and if no side effects, increase to 1 daily    PE (pulmonary thromboembolism) (HCC)  -     apixaban (ELIQUIS) 5 MG TABS tablet; TAKE 1 TABLET TWICE DAILY  -     CBC with Auto Differential    Pulmonary hypertension (HCC)  -     amLODIPine (NORVASC) 10 MG tablet; TAKE 1 TABLET EVERY DAY  Increase labetalol (NORMODYNE) 200 MG tablet; Take 1 tablet by mouth 2 times daily    Essential hypertension  -     amLODIPine (NORVASC) 10 MG tablet; TAKE 1 TABLET EVERY DAY  Increase  labetalol (NORMODYNE) 200 MG tablet; Take 1 tablet by mouth 2 times daily  -     Lipid Panel  -     Comprehensive Metabolic Panel  -     CBC with Auto Differential    Stable and continue on current medications. Return Memory and BP check, bring a family member 2/15 at 1:20 PM, for Bring all your medication bottles and log of your blood pressure with you. HPI:  Cristo Montiel is a 78 y.o. Hypertension:  Home blood pressure monitoring: Yes - 132/80 on amlodipine 10 mg daily and labetalol 100 mg bid. She is adherent to a low sodium diet. Patient denies chest pain, shortness of breath, headache, lightheadedness, blurred vision, peripheral edema, palpitations, dry cough, and fatigue. Antihypertensive medication side effects: no medication side effects noted. Use of agents associated with hypertension: none. PE: stable on Eliquis 5 mg twice a day and told by pulmonologist to stay on for life. Lab Results   Component Value Date    LABMICR YES 09/01/2020     Lab Results   Component Value Date     01/06/2022    K 4.6 01/06/2022     01/06/2022    CO2 22 01/06/2022    BUN 19 01/06/2022    CREATININE 0.9 01/06/2022    GLUCOSE 94 01/06/2022    CALCIUM 9.6 01/06/2022     Lab Results   Component Value Date/Time    CHOL 165 01/06/2022 09:05 AM    TRIG 71 01/06/2022 09:05 AM    HDL 47 01/06/2022 09:05 AM    LDLCALC 104 01/06/2022 09:05 AM     Lab Results   Component Value Date    ALT 9 (L) 01/06/2022    AST 12 (L) 01/06/2022     Lab Results   Component Value Date    TSH 3.55 04/11/2019    TSH 2.61 04/12/2018     Lab Results   Component Value Date    WBC 6.8 12/17/2020    HGB 14.7 12/17/2020    HCT 45.9 12/17/2020    MCV 80.9 12/17/2020     12/17/2020     Lab Results   Component Value Date    INR 1.13 09/01/2020    INR 1.09 04/06/2015      No results found for: PSA   No results found for: [facility]     Patient Active Problem List   Diagnosis    Essential hypertension    Obesity (BMI 35.0-39.9 without comorbidity)    PE (pulmonary thromboembolism) (HCC)    Pulmonary HTN (HCC)       No Known Allergies  Outpatient Medications Marked as Taking for the 1/18/23 encounter (Office Visit) with Kourtney Arriaga MD   Medication Sig Dispense Refill    amLODIPine (NORVASC) 10 MG tablet TAKE 1 TABLET EVERY DAY 90 tablet 1    apixaban (ELIQUIS) 5 MG TABS tablet TAKE 1 TABLET TWICE DAILY 180 tablet 1    labetalol (NORMODYNE) 100 MG tablet TAKE 1 TABLET TWICE DAILY 180 tablet 1    aspirin 81 MG tablet Take 1 tablet by mouth daily 90 tablet 3         Review of Systems: 14 systems were negative except of what was stated on HPI    Nursing note and vitals reviewed.     Vitals:    01/18/23 0927   BP: (!) 144/80   Pulse: 69   SpO2: 98%   Weight: 203 lb (92.1 kg)   Height: 5' 4\" (1.626 m)     Wt Readings from Last 3 Encounters:   01/18/23 203 lb (92.1 kg)   09/12/22 201 lb (91.2 kg)   07/05/22 205 lb 6.4 oz (93.2 kg)     BP Readings from Last 3 Encounters:   01/18/23 (!) 144/80   09/12/22 132/64   07/05/22 (!) 144/78     Body mass index is 34.84 kg/m². Constitutional: Patient appears well-developed and well-nourished. No distress. Head: Normocephalic and atraumatic. Neck: Normal range of motion. Neck supple. No thyroidmegaly. Cardiovascular: Normal rate, regular rhythm, normal heart sounds and intact distal pulses. Pulmonary/Chest: Effort normal and breath sounds normal. No stridor. No respiratory distress. No wheezes and no rales. Abdominal: Soft. Bowel sounds are normal. No distension and no mass. No tenderness. No rebound and no guarding. Musculoskeletal: No edema and no tenderness. Skin: No rash or erythema.

## 2023-01-19 LAB
A/G RATIO: 1.6 (ref 1.1–2.2)
ALBUMIN SERPL-MCNC: 4.4 G/DL (ref 3.4–5)
ALP BLD-CCNC: 111 U/L (ref 40–129)
ALT SERPL-CCNC: 13 U/L (ref 10–40)
ANION GAP SERPL CALCULATED.3IONS-SCNC: 16 MMOL/L (ref 3–16)
AST SERPL-CCNC: 17 U/L (ref 15–37)
BASOPHILS ABSOLUTE: 0 K/UL (ref 0–0.2)
BASOPHILS RELATIVE PERCENT: 0.5 %
BILIRUB SERPL-MCNC: 0.5 MG/DL (ref 0–1)
BUN BLDV-MCNC: 17 MG/DL (ref 7–20)
CALCIUM SERPL-MCNC: 9.7 MG/DL (ref 8.3–10.6)
CHLORIDE BLD-SCNC: 105 MMOL/L (ref 99–110)
CHOLESTEROL, TOTAL: 173 MG/DL (ref 0–199)
CO2: 21 MMOL/L (ref 21–32)
CREAT SERPL-MCNC: 0.8 MG/DL (ref 0.6–1.2)
EOSINOPHILS ABSOLUTE: 0.2 K/UL (ref 0–0.6)
EOSINOPHILS RELATIVE PERCENT: 2.8 %
GFR SERPL CREATININE-BSD FRML MDRD: >60 ML/MIN/{1.73_M2}
GLUCOSE BLD-MCNC: 109 MG/DL (ref 70–99)
HCT VFR BLD CALC: 47.3 % (ref 36–48)
HDLC SERPL-MCNC: 43 MG/DL (ref 40–60)
HEMOGLOBIN: 15.5 G/DL (ref 12–16)
LDL CHOLESTEROL CALCULATED: 115 MG/DL
LYMPHOCYTES ABSOLUTE: 1.6 K/UL (ref 1–5.1)
LYMPHOCYTES RELATIVE PERCENT: 20.9 %
MCH RBC QN AUTO: 27.9 PG (ref 26–34)
MCHC RBC AUTO-ENTMCNC: 32.7 G/DL (ref 31–36)
MCV RBC AUTO: 85.3 FL (ref 80–100)
MONOCYTES ABSOLUTE: 0.4 K/UL (ref 0–1.3)
MONOCYTES RELATIVE PERCENT: 5.7 %
NEUTROPHILS ABSOLUTE: 5.3 K/UL (ref 1.7–7.7)
NEUTROPHILS RELATIVE PERCENT: 70.1 %
PDW BLD-RTO: 15.7 % (ref 12.4–15.4)
PLATELET # BLD: 252 K/UL (ref 135–450)
PMV BLD AUTO: 8.5 FL (ref 5–10.5)
POTASSIUM SERPL-SCNC: 4.6 MMOL/L (ref 3.5–5.1)
RBC # BLD: 5.55 M/UL (ref 4–5.2)
SODIUM BLD-SCNC: 142 MMOL/L (ref 136–145)
TOTAL PROTEIN: 7.1 G/DL (ref 6.4–8.2)
TRIGL SERPL-MCNC: 77 MG/DL (ref 0–150)
VLDLC SERPL CALC-MCNC: 15 MG/DL
WBC # BLD: 7.5 K/UL (ref 4–11)

## 2023-02-15 ENCOUNTER — OFFICE VISIT (OUTPATIENT)
Dept: INTERNAL MEDICINE CLINIC | Age: 78
End: 2023-02-15

## 2023-02-15 VITALS
HEIGHT: 64 IN | DIASTOLIC BLOOD PRESSURE: 78 MMHG | OXYGEN SATURATION: 95 % | BODY MASS INDEX: 35.17 KG/M2 | HEART RATE: 74 BPM | WEIGHT: 206 LBS | SYSTOLIC BLOOD PRESSURE: 130 MMHG

## 2023-02-15 DIAGNOSIS — I10 ESSENTIAL HYPERTENSION: Primary | ICD-10-CM

## 2023-02-15 DIAGNOSIS — R62.50 DEVELOPMENTAL DELAY: ICD-10-CM

## 2023-02-15 DIAGNOSIS — R41.3 MEMORY LOSS DUE TO MEDICAL CONDITION: ICD-10-CM

## 2023-02-15 DIAGNOSIS — I27.20 PULMONARY HYPERTENSION (HCC): ICD-10-CM

## 2023-02-15 DIAGNOSIS — E66.01 SEVERE OBESITY (BMI 35.0-39.9) WITH COMORBIDITY (HCC): ICD-10-CM

## 2023-02-15 RX ORDER — DONEPEZIL HYDROCHLORIDE 10 MG/1
10 TABLET, FILM COATED ORAL NIGHTLY
Qty: 90 TABLET | Refills: 2 | Status: SHIPPED | OUTPATIENT
Start: 2023-04-13

## 2023-02-15 RX ORDER — LABETALOL 200 MG/1
200 TABLET, FILM COATED ORAL 2 TIMES DAILY
Qty: 180 TABLET | Refills: 0 | Status: SHIPPED | OUTPATIENT
Start: 2023-04-13

## 2023-02-15 SDOH — ECONOMIC STABILITY: HOUSING INSECURITY
IN THE LAST 12 MONTHS, WAS THERE A TIME WHEN YOU DID NOT HAVE A STEADY PLACE TO SLEEP OR SLEPT IN A SHELTER (INCLUDING NOW)?: NO

## 2023-02-15 SDOH — ECONOMIC STABILITY: FOOD INSECURITY: WITHIN THE PAST 12 MONTHS, THE FOOD YOU BOUGHT JUST DIDN'T LAST AND YOU DIDN'T HAVE MONEY TO GET MORE.: NEVER TRUE

## 2023-02-15 SDOH — ECONOMIC STABILITY: FOOD INSECURITY: WITHIN THE PAST 12 MONTHS, YOU WORRIED THAT YOUR FOOD WOULD RUN OUT BEFORE YOU GOT MONEY TO BUY MORE.: NEVER TRUE

## 2023-02-15 SDOH — ECONOMIC STABILITY: INCOME INSECURITY: HOW HARD IS IT FOR YOU TO PAY FOR THE VERY BASICS LIKE FOOD, HOUSING, MEDICAL CARE, AND HEATING?: NOT VERY HARD

## 2023-02-15 NOTE — PROGRESS NOTES
Karri Branch  YOB: 1945    Date of Service:  2/15/2023    Chief Complaint:      Chief Complaint   Patient presents with    Hypertension    Memory Loss       Assessment/Plan:  Bhakti Thorpe was seen today for hypertension and memory loss. Diagnoses and all orders for this visit:    Essential hypertension  -     labetalol (NORMODYNE) 200 MG tablet; Take 1 tablet by mouth 2 times daily    Pulmonary hypertension (HCC)  -     labetalol (NORMODYNE) 200 MG tablet; Take 1 tablet by mouth 2 times daily    Memory loss due to medical condition  -     donepezil (ARICEPT) 10 MG tablet; Take 1 tablet by mouth nightly    Developmental delay  -     donepezil (ARICEPT) 10 MG tablet; Take 1 tablet by mouth nightly    Severe obesity (BMI 35.0-39. 9) with comorbidity ([address]  Goals:   -Eat small amounts of food 4-5 times daily  -Avoid high fat and high sugar foods  -Include protein with all meals and snacks  -Avoid carbonation and caffeine  -Avoid calorie containing beverages  -Increase physical activity as tolerated    Return BP and memory 7/12 at 9:50 AM.      HPI:  Karri Branch is a 78 y.o. Hypertension:  Home blood pressure monitoring: Yes - 124-130/80 on Amlodipine 10 mg daily and Labetalol 200 mg bid. She is adherent to a low sodium diet. Patient denies chest pain, shortness of breath, headache, lightheadedness, blurred vision, peripheral edema, palpitations, dry cough, and fatigue. Antihypertensive medication side effects: no medication side effects noted. Use of agents associated with hypertension: none. PE: stable on Eliquis 5 mg twice a day and told by pulmonologist to stay on for life.     Developmental delay as a child:  she lives in a subsidized housing    Lab Results   Component Value Date    LABMICR YES 09/01/2020     Lab Results   Component Value Date     01/18/2023    K 4.6 01/18/2023     01/18/2023    CO2 21 01/18/2023    BUN 17 01/18/2023    CREATININE 0.8 01/18/2023    GLUCOSE 109 (H) 01/18/2023    CALCIUM 9.7 01/18/2023     Lab Results   Component Value Date/Time    CHOL 173 01/18/2023 10:02 AM    TRIG 77 01/18/2023 10:02 AM    HDL 43 01/18/2023 10:02 AM    LDLCALC 115 01/18/2023 10:02 AM     Lab Results   Component Value Date    ALT 13 01/18/2023    AST 17 01/18/2023     Lab Results   Component Value Date    TSH 3.55 04/11/2019    TSH 2.61 04/12/2018     Lab Results   Component Value Date    WBC 7.5 01/18/2023    HGB 15.5 01/18/2023    HCT 47.3 01/18/2023    MCV 85.3 01/18/2023     01/18/2023     Lab Results   Component Value Date    INR 1.13 09/01/2020    INR 1.09 04/06/2015      No results found for: PSA   No results found for: [facility]     Patient Active Problem List   Diagnosis    Essential hypertension    Obesity (BMI 35.0-39.9 without comorbidity)    PE (pulmonary thromboembolism) ([facility] [address]    Pulmonary HTN (HCC)    Dementia without behavioral disturbance, psychotic disturbance, mood disturbance, or anxiety ([facility]       No Known Allergies  Outpatient Medications Marked as Taking for the 2/15/23 encounter (Office Visit) with Linda Hamilton MD   Medication Sig Dispense Refill    amLODIPine (NORVASC) 10 MG tablet TAKE 1 TABLET EVERY DAY 90 tablet 3    donepezil (ARICEPT) 10 MG tablet Start with 1/2 pill daily for 1 week and if no side effects, increase to 1 daily 90 tablet 0    apixaban (ELIQUIS) 5 MG TABS tablet TAKE 1 TABLET TWICE DAILY 180 tablet 3    labetalol (NORMODYNE) 200 MG tablet Take 1 tablet by mouth 2 times daily 180 tablet 0    aspirin 81 MG tablet Take 1 tablet by mouth daily 90 tablet 3         Review of Systems: 14 systems were negative except of what was stated on HPI    Nursing note and vitals reviewed.     Vitals:    02/15/23 1329   BP: 130/78   Pulse: 74   SpO2: 95%   Weight: 206 lb (93.4 kg)   Height: 5' 4\" (1.626 m)     Wt Readings from Last 3 Encounters:   02/15/23 206 lb (93.4 kg)   01/18/23 203 lb (92.1 kg)   09/12/22 201 lb (91.2 kg)     BP Readings from Last 3 Encounters:   02/15/23 130/78   01/18/23 (!) 144/80   09/12/22 132/64     Body mass index is 35.36 kg/m². Constitutional: Patient appears well-developed and well-nourished. No distress. Head: Normocephalic and atraumatic. Neck: Normal range of motion. Neck supple. No thyroidmegaly. Cardiovascular: Normal rate, regular rhythm, normal heart sounds and intact distal pulses. Pulmonary/Chest: Effort normal and breath sounds normal. No stridor. No respiratory distress. No wheezes and no rales. Abdominal: Soft. Bowel sounds are normal. No distension and no mass. No tenderness. No rebound and no guarding. Musculoskeletal: No edema and no tenderness. Skin: No rash or erythema.

## 2023-04-27 ENCOUNTER — OFFICE VISIT (OUTPATIENT)
Dept: PULMONOLOGY | Age: 78
End: 2023-04-27
Payer: MEDICARE

## 2023-04-27 VITALS
DIASTOLIC BLOOD PRESSURE: 87 MMHG | BODY MASS INDEX: 35.27 KG/M2 | SYSTOLIC BLOOD PRESSURE: 128 MMHG | OXYGEN SATURATION: 97 % | HEART RATE: 63 BPM | HEIGHT: 64 IN | RESPIRATION RATE: 18 BRPM | TEMPERATURE: 97.2 F | WEIGHT: 206.6 LBS

## 2023-04-27 DIAGNOSIS — I27.20 PULMONARY HYPERTENSION (HCC): ICD-10-CM

## 2023-04-27 DIAGNOSIS — R91.8 PULMONARY NODULES: ICD-10-CM

## 2023-04-27 DIAGNOSIS — Z86.711 HISTORY OF PULMONARY EMBOLISM: Primary | ICD-10-CM

## 2023-04-27 PROCEDURE — 3074F SYST BP LT 130 MM HG: CPT | Performed by: STUDENT IN AN ORGANIZED HEALTH CARE EDUCATION/TRAINING PROGRAM

## 2023-04-27 PROCEDURE — 1090F PRES/ABSN URINE INCON ASSESS: CPT | Performed by: STUDENT IN AN ORGANIZED HEALTH CARE EDUCATION/TRAINING PROGRAM

## 2023-04-27 PROCEDURE — G8399 PT W/DXA RESULTS DOCUMENT: HCPCS | Performed by: STUDENT IN AN ORGANIZED HEALTH CARE EDUCATION/TRAINING PROGRAM

## 2023-04-27 PROCEDURE — 1036F TOBACCO NON-USER: CPT | Performed by: STUDENT IN AN ORGANIZED HEALTH CARE EDUCATION/TRAINING PROGRAM

## 2023-04-27 PROCEDURE — 99213 OFFICE O/P EST LOW 20 MIN: CPT | Performed by: STUDENT IN AN ORGANIZED HEALTH CARE EDUCATION/TRAINING PROGRAM

## 2023-04-27 PROCEDURE — 1123F ACP DISCUSS/DSCN MKR DOCD: CPT | Performed by: STUDENT IN AN ORGANIZED HEALTH CARE EDUCATION/TRAINING PROGRAM

## 2023-04-27 PROCEDURE — 3079F DIAST BP 80-89 MM HG: CPT | Performed by: STUDENT IN AN ORGANIZED HEALTH CARE EDUCATION/TRAINING PROGRAM

## 2023-04-27 PROCEDURE — G8417 CALC BMI ABV UP PARAM F/U: HCPCS | Performed by: STUDENT IN AN ORGANIZED HEALTH CARE EDUCATION/TRAINING PROGRAM

## 2023-04-27 PROCEDURE — G8427 DOCREV CUR MEDS BY ELIG CLIN: HCPCS | Performed by: STUDENT IN AN ORGANIZED HEALTH CARE EDUCATION/TRAINING PROGRAM

## 2023-04-27 ASSESSMENT — ENCOUNTER SYMPTOMS
SHORTNESS OF BREATH: 0
BACK PAIN: 0
DIARRHEA: 0
VOMITING: 0
EYE ITCHING: 0
COLOR CHANGE: 0
ABDOMINAL PAIN: 0
COUGH: 0
EYE REDNESS: 0
WHEEZING: 0
EYE PAIN: 0
NAUSEA: 0
TROUBLE SWALLOWING: 0
CONSTIPATION: 0
EYE DISCHARGE: 0
SORE THROAT: 0
STRIDOR: 0
ABDOMINAL DISTENTION: 0

## 2023-04-27 NOTE — PROGRESS NOTES
MA Communication:   The following orders are received by verbal communication from   Chantel Renteria MD    Orders include:  fu as needed
CDT. Repeat CTPE with no clots, cont Eliquis 5mg PO BID  - ECHO from prior with no high right-sided pressures, she has no complaints of SOB. Monitor for nwo  - Reviewed prior lung nodules with repeat CT chest, does not need cont surveillance  - Advised pt to get flu shot annual  - Return to clinic PRN    Return if symptoms worsen or fail to improve.        Shaina Andersen MD

## 2023-04-27 NOTE — PATIENT INSTRUCTIONS
Remember to bring a list of pulmonary medications and any CPAP or BiPAP machines to your next appointment with the office. Please keep all of your future appointments scheduled by [facility] office. Out of respect for other patients and providers, you may be asked to reschedule your appointment if you arrive later than your scheduled appointment time. Appointments cancelled less than 24hrs in advance will be considered a no show. Patients with three missed appointments within 1 year or four missed appointments within 2 years can be dismissed from the practice. Please be aware that our physicians are required to work in the Intensive Care Unit at [facility].  Your appointment may need to be rescheduled if they are designated to work during your appointment time. You may receive a survey regarding the care you received during your visit. Your input is valuable to us. We encourage you to complete and return your survey. We hope you will choose us in the future for your healthcare needs. Pt instructed of all future appointment dates & times, including radiology, labs, procedures & referrals. If procedures were scheduled preparation instructions provided. Instructions on future appointments with [facility] were given.

## 2023-06-12 DIAGNOSIS — I10 ESSENTIAL HYPERTENSION: ICD-10-CM

## 2023-06-12 DIAGNOSIS — I27.20 PULMONARY HYPERTENSION (HCC): ICD-10-CM

## 2023-06-12 RX ORDER — LABETALOL 200 MG/1
TABLET, FILM COATED ORAL
Qty: 180 TABLET | Refills: 0 | OUTPATIENT
Start: 2023-06-12

## 2023-07-12 ENCOUNTER — OFFICE VISIT (OUTPATIENT)
Dept: INTERNAL MEDICINE CLINIC | Age: 78
End: 2023-07-12

## 2023-07-12 VITALS
HEIGHT: 64 IN | SYSTOLIC BLOOD PRESSURE: 142 MMHG | WEIGHT: 211 LBS | DIASTOLIC BLOOD PRESSURE: 70 MMHG | HEART RATE: 63 BPM | OXYGEN SATURATION: 98 % | BODY MASS INDEX: 36.02 KG/M2

## 2023-07-12 DIAGNOSIS — I10 ESSENTIAL HYPERTENSION: Primary | ICD-10-CM

## 2023-07-12 DIAGNOSIS — R41.3 MEMORY LOSS DUE TO MEDICAL CONDITION: ICD-10-CM

## 2023-07-12 DIAGNOSIS — E66.9 OBESITY (BMI 35.0-39.9 WITHOUT COMORBIDITY): ICD-10-CM

## 2023-07-12 DIAGNOSIS — I26.99 PE (PULMONARY THROMBOEMBOLISM) (HCC): ICD-10-CM

## 2023-07-12 DIAGNOSIS — I27.20 PULMONARY HYPERTENSION (HCC): ICD-10-CM

## 2023-07-12 DIAGNOSIS — R62.50 DEVELOPMENTAL DELAY: ICD-10-CM

## 2023-07-12 RX ORDER — LABETALOL 200 MG/1
200 TABLET, FILM COATED ORAL 2 TIMES DAILY
Qty: 180 TABLET | Refills: 3 | Status: SHIPPED | OUTPATIENT
Start: 2023-07-12

## 2023-07-12 NOTE — PROGRESS NOTES
Norma Garcia  YOB: 1945    Date of Service:  7/12/2023    Chief Complaint:      Chief Complaint   Patient presents with    Hypertension    Memory Loss       Assessment/Plan:  Paty Goetz was seen today for hypertension and memory loss. Diagnoses and all orders for this visit:    Essential hypertension  -     labetalol (NORMODYNE) 200 MG tablet; Take 1 tablet by mouth 2 times daily    Pulmonary hypertension (HCC)  -     labetalol (NORMODYNE) 200 MG tablet; Take 1 tablet by mouth 2 times daily    Memory loss due to medical condition    Developmental delay    Obesity (BMI 35.0-39.9 without comorbidity)    PE (pulmonary thromboembolism) (HCC)    Stable and continue on current medications. Return Fasting AWV and f/u 1/18. HPI:  Norma Garcia is a 70 y.o. Hypertension:  Home blood pressure monitoring: Yes - 124-130/80 on Amlodipine 10 mg daily and Labetalol 200 mg bid. She is adherent to a low sodium diet. Patient denies chest pain, shortness of breath, headache, lightheadedness, blurred vision, peripheral edema, palpitations, dry cough, and fatigue. Antihypertensive medication side effects: no medication side effects noted. Use of agents associated with hypertension: none. PE: stable on Eliquis 5 mg twice a day and told by pulmonologist to stay on for life.     Developmental delay as a child:  she lives in a subsidized housing    Lab Results   Component Value Date    LABMICR YES 09/01/2020     Lab Results   Component Value Date     01/18/2023    K 4.6 01/18/2023     01/18/2023    CO2 21 01/18/2023    BUN 17 01/18/2023    CREATININE 0.8 01/18/2023    GLUCOSE 109 (H) 01/18/2023    CALCIUM 9.7 01/18/2023     Lab Results   Component Value Date/Time    CHOL 173 01/18/2023 10:02 AM    TRIG 77 01/18/2023 10:02 AM    HDL 43 01/18/2023 10:02 AM    LDLCALC 115 01/18/2023 10:02 AM     Lab Results   Component Value Date    ALT 13 01/18/2023    AST 17 01/18/2023     Lab Results

## 2023-12-05 DIAGNOSIS — I10 ESSENTIAL HYPERTENSION: ICD-10-CM

## 2023-12-05 DIAGNOSIS — I27.20 PULMONARY HYPERTENSION (HCC): ICD-10-CM

## 2023-12-05 RX ORDER — AMLODIPINE BESYLATE 10 MG/1
TABLET ORAL
Qty: 90 TABLET | Refills: 3 | OUTPATIENT
Start: 2023-12-05

## 2023-12-24 DIAGNOSIS — R41.3 MEMORY LOSS DUE TO MEDICAL CONDITION: ICD-10-CM

## 2023-12-24 DIAGNOSIS — R62.50 DEVELOPMENTAL DELAY: ICD-10-CM

## 2023-12-24 DIAGNOSIS — I10 ESSENTIAL HYPERTENSION: ICD-10-CM

## 2023-12-24 DIAGNOSIS — I27.20 PULMONARY HYPERTENSION (HCC): ICD-10-CM

## 2023-12-27 RX ORDER — DONEPEZIL HYDROCHLORIDE 10 MG/1
10 TABLET, FILM COATED ORAL NIGHTLY
Qty: 90 TABLET | Refills: 0 | Status: SHIPPED | OUTPATIENT
Start: 2023-12-27

## 2023-12-27 RX ORDER — AMLODIPINE BESYLATE 10 MG/1
TABLET ORAL
Qty: 90 TABLET | Refills: 0 | Status: SHIPPED | OUTPATIENT
Start: 2023-12-27

## 2024-01-18 ENCOUNTER — OFFICE VISIT (OUTPATIENT)
Dept: INTERNAL MEDICINE CLINIC | Age: 79
End: 2024-01-18

## 2024-01-18 VITALS
OXYGEN SATURATION: 97 % | HEIGHT: 64 IN | WEIGHT: 204 LBS | BODY MASS INDEX: 34.83 KG/M2 | HEART RATE: 69 BPM | DIASTOLIC BLOOD PRESSURE: 70 MMHG | SYSTOLIC BLOOD PRESSURE: 150 MMHG

## 2024-01-18 DIAGNOSIS — F03.B0 MODERATE DEMENTIA WITHOUT BEHAVIORAL DISTURBANCE, PSYCHOTIC DISTURBANCE, MOOD DISTURBANCE, OR ANXIETY, UNSPECIFIED DEMENTIA TYPE (HCC): ICD-10-CM

## 2024-01-18 DIAGNOSIS — I10 ESSENTIAL HYPERTENSION: ICD-10-CM

## 2024-01-18 DIAGNOSIS — R62.50 DEVELOPMENTAL DELAY: ICD-10-CM

## 2024-01-18 DIAGNOSIS — Z00.00 MEDICARE ANNUAL WELLNESS VISIT, SUBSEQUENT: Primary | ICD-10-CM

## 2024-01-18 DIAGNOSIS — I26.99 PE (PULMONARY THROMBOEMBOLISM) (HCC): ICD-10-CM

## 2024-01-18 DIAGNOSIS — I27.20 PULMONARY HYPERTENSION (HCC): ICD-10-CM

## 2024-01-18 DIAGNOSIS — E66.01 SEVERE OBESITY (BMI 35.0-39.9) WITH COMORBIDITY (HCC): ICD-10-CM

## 2024-01-18 DIAGNOSIS — R41.3 MEMORY LOSS DUE TO MEDICAL CONDITION: ICD-10-CM

## 2024-01-18 RX ORDER — DONEPEZIL HYDROCHLORIDE 10 MG/1
10 TABLET, FILM COATED ORAL NIGHTLY
Qty: 90 TABLET | Refills: 3 | Status: SHIPPED | OUTPATIENT
Start: 2024-01-18

## 2024-01-18 RX ORDER — LOSARTAN POTASSIUM 25 MG/1
25 TABLET ORAL DAILY
Qty: 90 TABLET | Refills: 0 | Status: SHIPPED | OUTPATIENT
Start: 2024-01-18

## 2024-01-18 RX ORDER — AMLODIPINE BESYLATE 10 MG/1
10 TABLET ORAL DAILY
Qty: 90 TABLET | Refills: 3 | Status: SHIPPED | OUTPATIENT
Start: 2024-01-18

## 2024-01-18 ASSESSMENT — PATIENT HEALTH QUESTIONNAIRE - PHQ9
1. LITTLE INTEREST OR PLEASURE IN DOING THINGS: 0
SUM OF ALL RESPONSES TO PHQ QUESTIONS 1-9: 0
SUM OF ALL RESPONSES TO PHQ QUESTIONS 1-9: 0
SUM OF ALL RESPONSES TO PHQ9 QUESTIONS 1 & 2: 0
SUM OF ALL RESPONSES TO PHQ QUESTIONS 1-9: 0
2. FEELING DOWN, DEPRESSED OR HOPELESS: 0
SUM OF ALL RESPONSES TO PHQ QUESTIONS 1-9: 0

## 2024-01-18 ASSESSMENT — LIFESTYLE VARIABLES
HOW OFTEN DO YOU HAVE A DRINK CONTAINING ALCOHOL: MONTHLY OR LESS
HOW MANY STANDARD DRINKS CONTAINING ALCOHOL DO YOU HAVE ON A TYPICAL DAY: 1 OR 2

## 2024-01-18 NOTE — PROGRESS NOTES
Medicare Annual Wellness Visit    Amada Daniels is here for Medicare AWV and Hypertension    Assessment & Plan   Medicare annual wellness visit, subsequent  Recommendations for Preventive Services Due: see orders and patient instructions/AVS.  Recommended screening schedule for the next 5-10 years is provided to the patient in written form: see Patient Instructions/AVS.     No follow-ups on file.     Subjective       Patient's complete Health Risk Assessment and screening values have been reviewed and are found in Flowsheets. The following problems were reviewed today and where indicated follow up appointments were made and/or referrals ordered.    Positive Risk Factor Screenings with Interventions:       Cognitive:      Words recalled: 1 Word Recalled     Total Score Interpretation: Abnormal Mini-Cog  Interventions:  Patient comments: able to recall 2/3 on second try            Activity, Diet, and Weight:  On average, how many days per week do you engage in moderate to strenuous exercise (like a brisk walk)?: 2 days  On average, how many minutes do you engage in exercise at this level?: 30 min    Do you eat balanced/healthy meals regularly?: Yes    Body mass index is 35.02 kg/m². (!) Abnormal    Obesity Interventions:  Patient comments: walk in the stores and around her apartment          Dentist Screen:  Have you seen the dentist within the past year?: (!) No (needs to schedule)    Intervention:  Advised to schedule with their dentist     Vision Screen:  Do you have difficulty driving, watching TV, or doing any of your daily activities because of your eyesight?: No  Have you had an eye exam within the past year?: (!) No (needs to schedule)  No results found.    Interventions:   Patient encouraged to make appointment with their eye specialist                Objective   Vitals:    01/18/24 0811   BP: (!) 152/72   Pulse: 69   SpO2: 97%   Weight: 92.5 kg (204 lb)   Height: 1.626 m (5' 4\")      Body mass index is 
TWICE DAILY 180 tablet 3    aspirin 81 MG tablet Take 1 tablet by mouth daily 90 tablet 3         Review of Systems: 14 systems were negative except of what was stated on HPI    Nursing note and vitals reviewed.    Vitals:    01/18/24 0811 01/18/24 0832   BP: (!) 152/72 (!) 150/70   Pulse: 69    SpO2: 97%    Weight: 92.5 kg (204 lb)    Height: 1.626 m (5' 4\")      Wt Readings from Last 3 Encounters:   01/18/24 92.5 kg (204 lb)   07/12/23 95.7 kg (211 lb)   04/27/23 93.7 kg (206 lb 9.6 oz)     BP Readings from Last 3 Encounters:   01/18/24 (!) 150/70   07/12/23 (!) 142/70   04/27/23 128/87     Body mass index is 35.02 kg/m².  Constitutional: Patient appears well-developed and well-nourished. No distress.   Head: Normocephalic and atraumatic.   Neck: Normal range of motion. Neck supple. No thyroidmegaly.   Cardiovascular: Normal rate, regular rhythm, normal heart sounds and intact distal pulses.   Pulmonary/Chest: Effort normal and breath sounds normal. No stridor. No respiratory distress. No wheezes and no rales.   Abdominal: Soft. Bowel sounds are normal. No distension and no mass. No tenderness. No rebound and no guarding.   Musculoskeletal: No edema and no tenderness.   Skin: No rash or erythema.

## 2024-01-19 LAB
ALBUMIN SERPL-MCNC: 4.3 G/DL (ref 3.4–5)
ALBUMIN/GLOB SERPL: 1.8 {RATIO} (ref 1.1–2.2)
ALP SERPL-CCNC: 113 U/L (ref 40–129)
ALT SERPL-CCNC: 14 U/L (ref 10–40)
ANION GAP SERPL CALCULATED.3IONS-SCNC: 10 MMOL/L (ref 3–16)
AST SERPL-CCNC: 16 U/L (ref 15–37)
BASOPHILS # BLD: 0 K/UL (ref 0–0.2)
BASOPHILS NFR BLD: 0.4 %
BILIRUB SERPL-MCNC: 0.4 MG/DL (ref 0–1)
BUN SERPL-MCNC: 15 MG/DL (ref 7–20)
CALCIUM SERPL-MCNC: 9 MG/DL (ref 8.3–10.6)
CHLORIDE SERPL-SCNC: 108 MMOL/L (ref 99–110)
CHOLEST SERPL-MCNC: 166 MG/DL (ref 0–199)
CO2 SERPL-SCNC: 27 MMOL/L (ref 21–32)
CREAT SERPL-MCNC: 1 MG/DL (ref 0.6–1.2)
DEPRECATED RDW RBC AUTO: 16 % (ref 12.4–15.4)
EOSINOPHIL # BLD: 0.3 K/UL (ref 0–0.6)
EOSINOPHIL NFR BLD: 3.8 %
GFR SERPLBLD CREATININE-BSD FMLA CKD-EPI: 58 ML/MIN/{1.73_M2}
GLUCOSE SERPL-MCNC: 105 MG/DL (ref 70–99)
HCT VFR BLD AUTO: 45.2 % (ref 36–48)
HDLC SERPL-MCNC: 42 MG/DL (ref 40–60)
HGB BLD-MCNC: 14.6 G/DL (ref 12–16)
LDLC SERPL CALC-MCNC: 108 MG/DL
LYMPHOCYTES # BLD: 1.6 K/UL (ref 1–5.1)
LYMPHOCYTES NFR BLD: 21.5 %
MCH RBC QN AUTO: 27.7 PG (ref 26–34)
MCHC RBC AUTO-ENTMCNC: 32.3 G/DL (ref 31–36)
MCV RBC AUTO: 85.5 FL (ref 80–100)
MONOCYTES # BLD: 0.4 K/UL (ref 0–1.3)
MONOCYTES NFR BLD: 5 %
NEUTROPHILS # BLD: 5.1 K/UL (ref 1.7–7.7)
NEUTROPHILS NFR BLD: 69.3 %
PLATELET # BLD AUTO: 229 K/UL (ref 135–450)
PMV BLD AUTO: 8.6 FL (ref 5–10.5)
POTASSIUM SERPL-SCNC: 4.9 MMOL/L (ref 3.5–5.1)
PROT SERPL-MCNC: 6.7 G/DL (ref 6.4–8.2)
RBC # BLD AUTO: 5.28 M/UL (ref 4–5.2)
SODIUM SERPL-SCNC: 145 MMOL/L (ref 136–145)
TRIGL SERPL-MCNC: 82 MG/DL (ref 0–150)
VLDLC SERPL CALC-MCNC: 16 MG/DL
WBC # BLD AUTO: 7.4 K/UL (ref 4–11)

## 2024-02-19 ENCOUNTER — OFFICE VISIT (OUTPATIENT)
Dept: INTERNAL MEDICINE CLINIC | Age: 79
End: 2024-02-19
Payer: MEDICARE

## 2024-02-19 VITALS
SYSTOLIC BLOOD PRESSURE: 138 MMHG | HEIGHT: 64 IN | BODY MASS INDEX: 35.17 KG/M2 | DIASTOLIC BLOOD PRESSURE: 74 MMHG | WEIGHT: 206 LBS | HEART RATE: 52 BPM | OXYGEN SATURATION: 97 %

## 2024-02-19 DIAGNOSIS — R41.3 MEMORY LOSS DUE TO MEDICAL CONDITION: ICD-10-CM

## 2024-02-19 DIAGNOSIS — E66.9 OBESITY (BMI 35.0-39.9 WITHOUT COMORBIDITY): ICD-10-CM

## 2024-02-19 DIAGNOSIS — I10 ESSENTIAL HYPERTENSION: Primary | ICD-10-CM

## 2024-02-19 PROCEDURE — 1090F PRES/ABSN URINE INCON ASSESS: CPT | Performed by: INTERNAL MEDICINE

## 2024-02-19 PROCEDURE — 3075F SYST BP GE 130 - 139MM HG: CPT | Performed by: INTERNAL MEDICINE

## 2024-02-19 PROCEDURE — G8417 CALC BMI ABV UP PARAM F/U: HCPCS | Performed by: INTERNAL MEDICINE

## 2024-02-19 PROCEDURE — 99213 OFFICE O/P EST LOW 20 MIN: CPT | Performed by: INTERNAL MEDICINE

## 2024-02-19 PROCEDURE — 1123F ACP DISCUSS/DSCN MKR DOCD: CPT | Performed by: INTERNAL MEDICINE

## 2024-02-19 PROCEDURE — 3078F DIAST BP <80 MM HG: CPT | Performed by: INTERNAL MEDICINE

## 2024-02-19 PROCEDURE — G8427 DOCREV CUR MEDS BY ELIG CLIN: HCPCS | Performed by: INTERNAL MEDICINE

## 2024-02-19 PROCEDURE — 1036F TOBACCO NON-USER: CPT | Performed by: INTERNAL MEDICINE

## 2024-02-19 PROCEDURE — G8399 PT W/DXA RESULTS DOCUMENT: HCPCS | Performed by: INTERNAL MEDICINE

## 2024-02-19 PROCEDURE — G8484 FLU IMMUNIZE NO ADMIN: HCPCS | Performed by: INTERNAL MEDICINE

## 2024-02-19 RX ORDER — LOSARTAN POTASSIUM 25 MG/1
25 TABLET ORAL DAILY
Qty: 90 TABLET | Refills: 3 | Status: SHIPPED | OUTPATIENT
Start: 2024-02-19

## 2024-02-19 SDOH — ECONOMIC STABILITY: FOOD INSECURITY: WITHIN THE PAST 12 MONTHS, THE FOOD YOU BOUGHT JUST DIDN'T LAST AND YOU DIDN'T HAVE MONEY TO GET MORE.: NEVER TRUE

## 2024-02-19 SDOH — ECONOMIC STABILITY: INCOME INSECURITY: HOW HARD IS IT FOR YOU TO PAY FOR THE VERY BASICS LIKE FOOD, HOUSING, MEDICAL CARE, AND HEATING?: NOT HARD AT ALL

## 2024-02-19 SDOH — ECONOMIC STABILITY: FOOD INSECURITY: WITHIN THE PAST 12 MONTHS, YOU WORRIED THAT YOUR FOOD WOULD RUN OUT BEFORE YOU GOT MONEY TO BUY MORE.: NEVER TRUE

## 2024-02-19 NOTE — PROGRESS NOTES
Amada Daniels  YOB: 1945    Date of Service:  2/19/2024    Chief Complaint:      Chief Complaint   Patient presents with    Hypertension       Assessment/Plan:  Amada was seen today for hypertension.    Diagnoses and all orders for this visit:    Essential hypertension  -     losartan (COZAAR) 25 MG tablet; Take 1 tablet by mouth daily    Memory loss due to medical condition    Obesity (BMI 35.0-39.9 without comorbidity)    Goals:   -Eat small amounts of food 4-5 times daily  -Avoid high fat and high sugar foods  -Include protein with all meals and snacks  -Avoid carbonation and caffeine  -Avoid calorie containing beverages  -Increase physical activity as tolerated      Return BP, memory and PE 7/17.      HPI:  Amada Daniels is a 78 y.o.    Hypertension:  Home blood pressure monitoring: Yes - 106-137/63-80 on Losartan 25 mg, Amlodipine 10 mg daily and Labetalol 200 mg bid.  She is adherent to a low sodium diet. Patient denies chest pain, shortness of breath, headache, lightheadedness, blurred vision, peripheral edema, palpitations, dry cough, and fatigue.  Antihypertensive medication side effects: no medication side effects noted.  Use of agents associated with hypertension: none.    PE: stable on Eliquis 5 mg twice a day and told by pulmonologist to stay on for life.    Memory loss:  stable on Aricept 10 mg qd  Developmental delay as a child:  she lives in a subsidized housing    No results found for: \"LABA1C\"  Lab Results   Component Value Date     01/18/2024    K 4.9 01/18/2024     01/18/2024    CO2 27 01/18/2024    BUN 15 01/18/2024    CREATININE 1.0 01/18/2024    GLUCOSE 105 (H) 01/18/2024    CALCIUM 9.0 01/18/2024     Lab Results   Component Value Date/Time    CHOL 166 01/18/2024 08:28 AM    TRIG 82 01/18/2024 08:28 AM    HDL 42 01/18/2024 08:28 AM    LDLCALC 108 01/18/2024 08:28 AM     Lab Results   Component Value Date    ALT 14 01/18/2024    AST 16 01/18/2024     Lab Results

## 2024-05-17 ENCOUNTER — HOSPITAL ENCOUNTER (EMERGENCY)
Age: 79
Discharge: HOME | End: 2024-05-17
Payer: OTHER MISCELLANEOUS

## 2024-05-17 ENCOUNTER — APPOINTMENT (OUTPATIENT)
Dept: GENERAL RADIOLOGY | Age: 79
End: 2024-05-17
Payer: OTHER MISCELLANEOUS

## 2024-05-17 VITALS
SYSTOLIC BLOOD PRESSURE: 175 MMHG | HEART RATE: 86 BPM | DIASTOLIC BLOOD PRESSURE: 90 MMHG | WEIGHT: 210 LBS | RESPIRATION RATE: 16 BRPM | BODY MASS INDEX: 35.85 KG/M2 | HEIGHT: 64 IN | TEMPERATURE: 98.3 F | OXYGEN SATURATION: 97 %

## 2024-05-17 DIAGNOSIS — S80.11XA HEMATOMA OF RIGHT LOWER EXTREMITY, INITIAL ENCOUNTER: Primary | ICD-10-CM
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
## 2024-05-17 PROCEDURE — 73590 X-RAY EXAM OF LOWER LEG: CPT

## 2024-05-17 PROCEDURE — 99283 EMERGENCY DEPT VISIT LOW MDM: CPT

## 2024-05-17 ASSESSMENT — PAIN - FUNCTIONAL ASSESSMENT: PAIN_FUNCTIONAL_ASSESSMENT: NONE - DENIES PAIN

## 2024-05-18 NOTE — ED NOTES
Pt wound covered with non adherent dressing and secured with an ace wrap. Pt instructed on use and verbalized understanding.

## 2024-05-18 NOTE — ED PROVIDER NOTES
ASPIRIN 81 MG TABLET    Take 1 tablet by mouth daily    DONEPEZIL (ARICEPT) 10 MG TABLET    Take 1 tablet by mouth nightly    LABETALOL (NORMODYNE) 200 MG TABLET    Take 1 tablet by mouth 2 times daily    LOSARTAN (COZAAR) 25 MG TABLET    Take 1 tablet by mouth daily       ALLERGIES     Patient has no known allergies.    FAMILYHISTORY       Family History   Problem Relation Age of Onset    Cancer Mother     Breast Cancer Mother     Coronary Art Dis Father         SOCIAL HISTORY       Social History     Tobacco Use    Smoking status: Never    Smokeless tobacco: Never   Substance Use Topics    Alcohol use: Yes     Alcohol/week: 0.0 standard drinks of alcohol     Comment: rarely    Drug use: No       SCREENINGS        [place] Coma Scale  Eye Opening: Spontaneous  Best Verbal Response: Oriented  Best Motor Response: Obeys commands  [place]io Coma Scale Score: 15                CIWA Assessment  BP: (!) 175/90  Pulse: 86           PHYSICAL EXAM  1 or more Elements     ED Triage Vitals [05/17/24 1951]   BP Temp Temp Source Pulse Respirations SpO2 Height Weight - Scale   (!) 175/90 98.3 °F (36.8 °C) Oral 86 16 97 % 1.626 m (5' 4\") 95.3 kg (210 lb)       Physical Exam  Vitals and nursing note reviewed.   Constitutional:       Appearance: Normal appearance. She is not diaphoretic.   HENT:      Head: Normocephalic and atraumatic.      Nose: Nose normal.      Mouth/Throat:      Mouth: Mucous membranes are moist.   Eyes:      General:         Right eye: No discharge.         Left eye: No discharge.      Extraocular Movements: Extraocular movements intact.   Pulmonary:      Effort: Pulmonary effort is normal. No respiratory distress.   Musculoskeletal:         General: Normal range of motion.      Cervical back: Normal range of motion and neck supple.        Legs:    Skin:     General: Skin is warm and dry.      Coloration: Skin is not pale.   Neurological:      Mental Status: She is alert and oriented to person, place, and time.

## 2024-08-12 NOTE — PATIENT INSTRUCTIONS
GENERAL PRE-PROCEDURE:   Procedure:  Tunneled central venous catheter replacement  Date/Time:  8/12/2024 10:02 AM    Written consent obtained?: Yes    Risks and benefits: Risks, benefits and alternatives were discussed    Consent given by:  Patient  Patient states understanding of procedure being performed: Yes    Patient's understanding of procedure matches consent: Yes    Procedure consent matches procedure scheduled: Yes    Expected level of sedation:  Moderate  Appropriately NPO:  Yes  ASA Class:  2  Mallampati  :  Grade 1- soft palate, uvula, tonsillar pillars, and posterior pharyngeal wall visible  Lungs:  Lungs clear with good breath sounds bilaterally  Heart:  Normal heart sounds and rate  History & Physical reviewed:  History and physical reviewed and no updates needed  Statement of review:  I have reviewed the lab findings, diagnostic data, medications, and the plan for sedation     Use less salt when you cook and eat. This helps lower your blood pressure. Taste food before salting. Add only a little salt when you think you need it. With time, your taste buds will adjust to less salt.     Eat fewer snack items, fast foods, canned soups, and other high-salt, high-fat, processed foods.     Read food labels and try to avoid saturated and trans fats. They increase your risk of heart disease by raising cholesterol levels.     Limit the amount of solid fat-butter, margarine, and shortening-you eat. Use olive, peanut, or canola oil when you cook. Bake, broil, and steam foods instead of frying them.     Eat a variety of fruit and vegetables every day. Dark green, deep orange, red, or yellow fruits and vegetables are especially good for you. Examples include spinach, carrots, peaches, and berries.     Foods high in fiber can reduce your cholesterol and provide important vitamins and minerals. High-fiber foods include whole-grain cereals and breads, oatmeal, beans, brown rice, citrus fruits, and apples.     Eat lean proteins. Heart-healthy proteins include seafood, lean meats and poultry, eggs, beans, peas, nuts, seeds, and soy products.     Limit drinks and foods with added sugar. These include candy, desserts, and soda pop.   Lifestyle changes    If your doctor recommends it, get more exercise. Walking is a good choice. Bit by bit, increase the amount you walk every day. Try for at least 30 minutes on most days of the week. You also may want to swim, bike, or do other activities.     Do not smoke. If you need help quitting, talk to your doctor about stop-smoking programs and medicines. These can increase your chances of quitting for good. Quitting smoking may be the most important step you can take to protect your heart. It is never too late to quit.     Limit alcohol to 2 drinks a day for men and 1 drink a day for women. Too much alcohol can cause health problems.     Manage other health problems such

## 2024-09-09 DIAGNOSIS — I10 ESSENTIAL HYPERTENSION: ICD-10-CM

## 2024-09-09 DIAGNOSIS — I27.20 PULMONARY HYPERTENSION (HCC): ICD-10-CM

## 2024-09-10 ENCOUNTER — OFFICE VISIT (OUTPATIENT)
Dept: INTERNAL MEDICINE CLINIC | Age: 79
End: 2024-09-10
Payer: MEDICARE

## 2024-09-10 VITALS
HEIGHT: 64 IN | SYSTOLIC BLOOD PRESSURE: 115 MMHG | HEART RATE: 48 BPM | WEIGHT: 205 LBS | OXYGEN SATURATION: 98 % | DIASTOLIC BLOOD PRESSURE: 65 MMHG | BODY MASS INDEX: 35 KG/M2

## 2024-09-10 DIAGNOSIS — Z23 NEED FOR IMMUNIZATION AGAINST INFLUENZA: ICD-10-CM

## 2024-09-10 DIAGNOSIS — I27.20 PULMONARY HYPERTENSION (HCC): ICD-10-CM

## 2024-09-10 DIAGNOSIS — R41.3 MEMORY LOSS DUE TO MEDICAL CONDITION: ICD-10-CM

## 2024-09-10 DIAGNOSIS — E66.9 OBESITY (BMI 35.0-39.9 WITHOUT COMORBIDITY): ICD-10-CM

## 2024-09-10 DIAGNOSIS — I10 ESSENTIAL HYPERTENSION: Primary | ICD-10-CM

## 2024-09-10 DIAGNOSIS — R62.50 DEVELOPMENTAL DELAY: ICD-10-CM

## 2024-09-10 PROBLEM — G30.9 ALZHEIMER'S DISEASE, UNSPECIFIED (CODE) (HCC): Status: ACTIVE | Noted: 2024-09-10

## 2024-09-10 PROBLEM — G30.9 ALZHEIMER'S DISEASE, UNSPECIFIED (CODE) (HCC): Status: RESOLVED | Noted: 2024-09-10 | Resolved: 2024-09-10

## 2024-09-10 PROBLEM — F03.B0 MODERATE DEMENTIA WITHOUT BEHAVIORAL DISTURBANCE, PSYCHOTIC DISTURBANCE, MOOD DISTURBANCE, OR ANXIETY, UNSPECIFIED DEMENTIA TYPE (HCC): Status: RESOLVED | Noted: 2024-01-18 | Resolved: 2024-09-10

## 2024-09-10 PROCEDURE — 1090F PRES/ABSN URINE INCON ASSESS: CPT | Performed by: INTERNAL MEDICINE

## 2024-09-10 PROCEDURE — 1123F ACP DISCUSS/DSCN MKR DOCD: CPT | Performed by: INTERNAL MEDICINE

## 2024-09-10 PROCEDURE — 99214 OFFICE O/P EST MOD 30 MIN: CPT | Performed by: INTERNAL MEDICINE

## 2024-09-10 PROCEDURE — 1036F TOBACCO NON-USER: CPT | Performed by: INTERNAL MEDICINE

## 2024-09-10 PROCEDURE — G8427 DOCREV CUR MEDS BY ELIG CLIN: HCPCS | Performed by: INTERNAL MEDICINE

## 2024-09-10 PROCEDURE — G8399 PT W/DXA RESULTS DOCUMENT: HCPCS | Performed by: INTERNAL MEDICINE

## 2024-09-10 PROCEDURE — 90653 IIV ADJUVANT VACCINE IM: CPT | Performed by: INTERNAL MEDICINE

## 2024-09-10 PROCEDURE — G8417 CALC BMI ABV UP PARAM F/U: HCPCS | Performed by: INTERNAL MEDICINE

## 2024-09-10 PROCEDURE — 3078F DIAST BP <80 MM HG: CPT | Performed by: INTERNAL MEDICINE

## 2024-09-10 PROCEDURE — G0008 ADMIN INFLUENZA VIRUS VAC: HCPCS | Performed by: INTERNAL MEDICINE

## 2024-09-10 PROCEDURE — 3074F SYST BP LT 130 MM HG: CPT | Performed by: INTERNAL MEDICINE

## 2024-09-10 RX ORDER — LABETALOL 200 MG/1
200 TABLET, FILM COATED ORAL 2 TIMES DAILY
Qty: 180 TABLET | Refills: 3 | Status: SHIPPED | OUTPATIENT
Start: 2024-09-10

## 2024-09-10 RX ORDER — LABETALOL 200 MG/1
200 TABLET, FILM COATED ORAL 2 TIMES DAILY
Qty: 180 TABLET | Refills: 3 | OUTPATIENT
Start: 2024-09-10

## 2024-12-10 DIAGNOSIS — R62.50 DEVELOPMENTAL DELAY: ICD-10-CM

## 2024-12-10 DIAGNOSIS — R41.3 MEMORY LOSS DUE TO MEDICAL CONDITION: ICD-10-CM

## 2024-12-10 RX ORDER — DONEPEZIL HYDROCHLORIDE 10 MG/1
10 TABLET, FILM COATED ORAL NIGHTLY
Qty: 90 TABLET | Refills: 0 | Status: SHIPPED | OUTPATIENT
Start: 2024-12-10

## 2024-12-31 NOTE — PROGRESS NOTES
Medicare Annual Wellness Visit  Name: Daisy Espitia Date: 2022   MRN: <[id number]> Sex: Female   Age: 78 y.o. Ethnicity: Non- / Non    : 1945 Race: White (non-)      Clay Francois is here for Medicare AWV    Screenings for behavioral, psychosocial and functional/safety risks, and cognitive dysfunction are all negative except as indicated below. These results, as well as other patient data from the [address] form, are documented in Flowsheets linked to this Encounter. No Known Allergies    Prior to Visit Medications    Medication Sig Taking? Authorizing Provider   amLODIPine (NORVASC) 10 MG tablet TAKE 1 TABLET EVERY DAY  C Shanda Perez MD   ELIQUIS 5 MG TABS tablet TAKE 1 TABLET TWICE DAILY  C Shanda Perez MD   labetalol (NORMODYNE) 100 MG tablet TAKE 1 TABLET TWICE DAILY  Sara Means MD   aspirin 81 MG tablet Take 1 tablet by mouth daily  Sara Means MD       Past Medical History:   Diagnosis Date    Hx of blood clots     Hypertension        Past Surgical History:   Procedure Laterality Date    COLONOSCOPY      COLONOSCOPY  16    colon polyp    EYE SURGERY      cataracts bilateral    HYSTERECTOMY         Family History   Problem Relation Age of Onset    Cancer Mother     Breast Cancer Mother     Coronary Art Dis Father        CareTeam (Including outside providers/suppliers regularly involved in providing care):   Patient Care Team:  LEEANNE Garcia DO as PCP - General (Family Medicine)  Bhupinder Tobias MD as PCP - Hematology/Oncology (Medical Oncology)  LEEANNE Garcia DO as PCP - [facility] Empaneled Provider    Wt Readings from Last 3 Encounters:   22 206 lb 3.2 oz (93.5 kg)   21 214 lb (97.1 kg)   20 205 lb (93 kg)     There were no vitals filed for this visit. There is no height or weight on file to calculate BMI.     Based upon direct observation of the patient, evaluation of cognition reveals recent memory intact, remote memory impaired. Patient's complete Health Risk Assessment and screening values have been reviewed and are found in Flowsheets. The following problems were reviewed today and where indicated follow up appointments were made and/or referrals ordered. Positive Risk Factor Screenings with Interventions:      Cognitive: Words recalled: 2 Words Recalled  Clock Drawing Test (CDT) Score: (!) Abnormal (could not spell the word world backwards)  Total Score Interpretation: Positive Mini-Cog  Cognitive Impairment Interventions:  · Patient declines any further evaluation/treatment for cognitive impairment       General Health and ACP:  General  In general, how would you say your health is?: Good  In the past 7 days, have you experienced any of the following?  New or Increased Pain, New or Increased Fatigue, Loneliness, Social Isolation, Stress or Anger?: None of These  Do you get the social and emotional support that you need?: Yes  Do you have a Living Will?: Yes  Advance Directives     Power of  Living Will ACP-Advance Directive ACP-Power of Junious Stairs on 10/14/20 Not on File Not on File Filed      General Health Risk Interventions:  · No Living Will: ACP documents already completed- patient asked to provide copy to the office    Health Habits/Nutrition:  Health Habits/Nutrition  Do you exercise for at least 20 minutes 2-3 times per week?: Yes  Have you lost any weight without trying in the past 3 months?: No  Do you eat only one meal per day?: No  Have you seen the dentist within the past year?: (!) No     Health Habits/Nutrition Interventions:  · Dental exam overdue:  patient encouraged to make appointment with his/her dentist    Hearing/Vision:  No exam data present  Hearing/Vision  Do you or your family notice any trouble with your hearing that hasn't been managed with hearing aids?: No  Do you have difficulty driving, watching TV, or doing any of your daily activities because of your eyesight?: No  Have you had an eye exam within the past year?: (!) No  Hearing/Vision Interventions:  · Vision concerns:  patient encouraged to make appointment with his/her eye specialist      Personalized Preventive Plan   Current Health Maintenance Status  Immunization History   Administered Date(s) Administered    COVID-19, Aurther Dyers, Primary or Immunocompromised, PF, 100mcg/0.5mL 02/23/2021, 03/23/2021, 11/10/2021    Influenza Vaccine, unspecified formulation 09/06/2016    Influenza Virus Vaccine 09/06/2016    Influenza Whole 09/01/2014    Influenza, High Dose (Fluzone 65 yrs and older) 09/18/2014, 09/15/2015, 08/31/2016, 09/11/2017, 09/20/2018, 10/07/2019    Influenza, High-dose, Quadv, 65 yrs +, IM (Fluzone) 09/26/2021    Influenza, Quadv, adjuvanted, 65 yrs +, IM, PF (Fluad) 10/01/2020    Pneumococcal Conjugate 13-valent ([id number]) 03/30/2016    Pneumococcal Polysaccharide ([id number]) 07/12/2018    Tdap (Boostrix, Adacel) 03/09/2018, 10/13/2018        Health Maintenance   Topic Date Due    Breast cancer screen  07/18/2019    Annual Wellness Visit (AWV)  12/18/2021    Shingles Vaccine (1 of 2) 01/06/2023 (Originally 4/21/1997)    Depression Screen  06/25/2022    Potassium monitoring  01/06/2023    Creatinine monitoring  01/06/2023    Colon cancer screen colonoscopy  04/19/2026    Lipid screen  01/06/2027    DTaP/Tdap/Td vaccine (3 - Td or Tdap) 10/13/2028    DEXA (modify frequency per FRAX score)  Completed    Flu vaccine  Completed    Pneumococcal 65+ years Vaccine  Completed    COVID-19 Vaccine  Completed    Hepatitis C screen  Completed    Hepatitis A vaccine  Aged Out    Hepatitis B vaccine  Aged Out    Hib vaccine  Aged Out    Meningococcal (ACWY) vaccine  Aged Out     Recommendations for [employer] Due: see orders and patient instructions/AVS.  .   Recommended screening schedule for the next 5-10 years is provided to the patient in written form: see MD       Past Medical History:   Diagnosis Date    Hx of blood clots 2015    Hypertension        Past Surgical History:   Procedure Laterality Date    COLONOSCOPY  2006    COLONOSCOPY  4/19/16    colon polyp    EYE SURGERY      cataracts bilateral    HYSTERECTOMY         Family History   Problem Relation Age of Onset    Cancer Mother     Breast Cancer Mother     Coronary Art Dis Father        CareTeam (Including outside providers/suppliers regularly involved in providing care):   Patient Care Team:  Therese Velásquez DO as PCP - General (Family Medicine)  Susi Ambriz MD as PCP - Hematology/Oncology (Medical Oncology)  Therese Velásquez DO as PCP - [facility] Empaneled Provider    Wt Readings from Last 3 Encounters:   01/06/22 206 lb 3.2 oz (93.5 kg)   06/25/21 214 lb (97.1 kg)   12/17/20 205 lb (93 kg)     There were no vitals filed for this visit. There is no height or weight on file to calculate BMI. Based upon direct observation of the patient, evaluation of cognition reveals recent and remote memory intact. Patient's complete Health Risk Assessment and screening values have been reviewed and are found in Flowsheets. The following problems were reviewed today and where indicated follow up appointments were made and/or referrals ordered. Positive Risk Factor Screenings with Interventions:      Cognitive: Words recalled: 2 Words Recalled  Clock Drawing Test (CDT) Score: (!) Abnormal (could not spell the word world backwards)  Total Score Interpretation: Positive Mini-Cog  Cognitive Impairment Interventions:  · Patient advised to follow-up in this office for further evaluation and treatment within 2 month(s)       General Health and ACP:  General  In general, how would you say your health is?: Good  In the past 7 days, have you experienced any of the following?  New or Increased Pain, New or Increased Fatigue, Loneliness, Social Isolation, Stress or Anger?: None of These  Do you get the social and emotional support that you need?: Yes  Do you have a Living Will?: Yes  Advance Directives     Power of  Living Will ACP-Advance Directive ACP-Power of Janie Gamez on 10/14/20 Not on File Not on [address] Interventions:  · No Living Will: Patient referred to [place] Habits/Nutrition:  Health Habits/Nutrition  Do you exercise for at least 20 minutes 2-3 times per week?: Yes  Have you lost any weight without trying in the past 3 months?: No  Do you eat only one meal per day?: No  Have you seen the dentist within the past year?: (!) No     Health Habits/Nutrition Interventions:  · Dental exam overdue:  patient encouraged to make appointment with his/her dentist    Hearing/Vision:  No exam data present  Hearing/Vision  Do you or your family notice any trouble with your hearing that hasn't been managed with hearing aids?: No  Do you have difficulty driving, watching TV, or doing any of your daily activities because of your eyesight?: No  Have you had an eye exam within the past year?: (!) No  Hearing/Vision Interventions:  · recommend eye appt      Personalized Preventive Plan   Current Health Maintenance Status  Immunization History   Administered Date(s) Administered    COVID-19, Jack Ealine, Primary or Immunocompromised, PF, 100mcg/0.5mL 02/23/2021, 03/23/2021, 11/10/2021    Influenza Vaccine, unspecified formulation 09/06/2016    Influenza Virus Vaccine 09/06/2016    Influenza Whole 09/01/2014    Influenza, High Dose (Fluzone 65 yrs and older) 09/18/2014, 09/15/2015, 08/31/2016, 09/11/2017, 09/20/2018, 10/07/2019    Influenza, High-dose, Doreenanisha Phillips, 65 yrs +, IM (Fluzone) 09/26/2021    Influenza, Quadv, adjuvanted, 65 yrs +, IM, PF (Fluad) 10/01/2020    Pneumococcal Conjugate 13-valent ([id number]) 03/30/2016    Pneumococcal Polysaccharide ([id number]) 07/12/2018    Tdap (Boostrix, Adacel) 03/09/2018, 10/13/2018        Health Maintenance   Topic Date shortness of breath Due    Breast cancer screen  07/18/2019   [facility] Annual Wellness Visit (AWV)  12/18/2021    Shingles Vaccine (1 of 2) 01/06/2023 (Originally 4/21/1997)    Depression Screen  06/25/2022    Potassium monitoring  01/06/2023    Creatinine monitoring  01/06/2023    Colon cancer screen colonoscopy  04/19/2026    Lipid screen  01/06/2027    DTaP/Tdap/Td vaccine (3 - Td or Tdap) 10/13/2028    DEXA (modify frequency per FRAX score)  Completed    Flu vaccine  Completed    Pneumococcal 65+ years Vaccine  Completed    COVID-19 Vaccine  Completed    Hepatitis C screen  Completed    Hepatitis A vaccine  Aged Out    Hepatitis B vaccine  Aged Out    Hib vaccine  Aged Out    Meningococcal (ACWY) vaccine  Aged Out     Recommendations for [employer] Due: see orders and patient instructions/AVS.  . Recommended screening schedule for the next 5-10 years is provided to the patient in written form: see Patient Instructions/AVS.    Sharmin NGUYEN LPN, 4/41/[birth year]86, performed the documented evaluation under the direct supervision of the attending physician. This encounter was performed under myShahram DOs, direct supervision, 1/18/2022.

## 2025-03-11 ENCOUNTER — OFFICE VISIT (OUTPATIENT)
Dept: INTERNAL MEDICINE CLINIC | Age: 80
End: 2025-03-11
Payer: MEDICARE

## 2025-03-11 VITALS
OXYGEN SATURATION: 97 % | HEIGHT: 64 IN | SYSTOLIC BLOOD PRESSURE: 123 MMHG | WEIGHT: 200 LBS | BODY MASS INDEX: 34.15 KG/M2 | DIASTOLIC BLOOD PRESSURE: 71 MMHG | HEART RATE: 53 BPM

## 2025-03-11 DIAGNOSIS — Z86.711 HISTORY OF PULMONARY EMBOLISM: ICD-10-CM

## 2025-03-11 DIAGNOSIS — I27.20 PULMONARY HYPERTENSION (HCC): ICD-10-CM

## 2025-03-11 DIAGNOSIS — Z13.220 SCREENING FOR HYPERLIPIDEMIA: ICD-10-CM

## 2025-03-11 DIAGNOSIS — I10 ESSENTIAL HYPERTENSION: Primary | ICD-10-CM

## 2025-03-11 PROCEDURE — 1036F TOBACCO NON-USER: CPT

## 2025-03-11 PROCEDURE — 1123F ACP DISCUSS/DSCN MKR DOCD: CPT

## 2025-03-11 PROCEDURE — 1090F PRES/ABSN URINE INCON ASSESS: CPT

## 2025-03-11 PROCEDURE — G8417 CALC BMI ABV UP PARAM F/U: HCPCS

## 2025-03-11 PROCEDURE — G8399 PT W/DXA RESULTS DOCUMENT: HCPCS

## 2025-03-11 PROCEDURE — 1160F RVW MEDS BY RX/DR IN RCRD: CPT

## 2025-03-11 PROCEDURE — 1159F MED LIST DOCD IN RCRD: CPT

## 2025-03-11 PROCEDURE — 99214 OFFICE O/P EST MOD 30 MIN: CPT

## 2025-03-11 PROCEDURE — G8427 DOCREV CUR MEDS BY ELIG CLIN: HCPCS

## 2025-03-11 PROCEDURE — 3078F DIAST BP <80 MM HG: CPT

## 2025-03-11 PROCEDURE — 3074F SYST BP LT 130 MM HG: CPT

## 2025-03-11 RX ORDER — LABETALOL 200 MG/1
200 TABLET, FILM COATED ORAL 2 TIMES DAILY
Qty: 180 TABLET | Refills: 1 | Status: SHIPPED | OUTPATIENT
Start: 2025-03-11

## 2025-03-11 RX ORDER — LOSARTAN POTASSIUM 25 MG/1
25 TABLET ORAL DAILY
Qty: 90 TABLET | Refills: 1 | Status: SHIPPED | OUTPATIENT
Start: 2025-03-11

## 2025-03-11 RX ORDER — AMLODIPINE BESYLATE 10 MG/1
10 TABLET ORAL DAILY
Qty: 90 TABLET | Refills: 1 | Status: SHIPPED | OUTPATIENT
Start: 2025-03-11

## 2025-03-11 SDOH — ECONOMIC STABILITY: FOOD INSECURITY: WITHIN THE PAST 12 MONTHS, YOU WORRIED THAT YOUR FOOD WOULD RUN OUT BEFORE YOU GOT MONEY TO BUY MORE.: NEVER TRUE

## 2025-03-11 ASSESSMENT — PATIENT HEALTH QUESTIONNAIRE - PHQ9
1. LITTLE INTEREST OR PLEASURE IN DOING THINGS: NOT AT ALL
SUM OF ALL RESPONSES TO PHQ QUESTIONS 1-9: 0
SUM OF ALL RESPONSES TO PHQ QUESTIONS 1-9: 0
2. FEELING DOWN, DEPRESSED OR HOPELESS: NOT AT ALL
SUM OF ALL RESPONSES TO PHQ QUESTIONS 1-9: 0
SUM OF ALL RESPONSES TO PHQ QUESTIONS 1-9: 0

## 2025-03-11 NOTE — PATIENT INSTRUCTIONS
-Continue with all your medications as prescribed, refills have been sent  -Follow-up with pulmonology on March 19 as scheduled  -Please obtain fasting blood work at your convenience.  Ideally 8 hours of fasting, can have black coffee or tea as long as there is no cream and sugar and water of course before hand  -If anything is very abnormal with the blood work when I receive the results, our office will be in contact with you  -Will plan for a 6-month follow-up for annual wellness visit.  I am available sooner if needed

## 2025-03-11 NOTE — PROGRESS NOTES
[facility]  [address]  [address]  [place], [place] [address]  Tel: [phone]    Amada Daniels  1945  female    CC:   Chief Complaint   Patient presents with    New Patient       HPI:   Patient is a 79-year-old female who presents today to establish care with me, also requesting refills of her chronic medicines.    Medical history includes PE, for which she is on long-term anticoagulation with Eliquis.  Has been compliant with this.  Denies any recent falls, bleeding episodes, trauma to the head.  Also takes aspirin 81 mg daily and tolerates that well.    Hypertension-takes amlodipine 10 mg daily, labetalol 200 mg twice daily, losartan 25 mg once daily.  Has been stable.    Previously took donepezil for possible memory issues, but patient stopped taking this due to lack of need.    Last colonoscopy 2016, not planning on continuing.     Family Hx:  Mother - breast CA  1 brother - 2 years older, healthy as far as she knows    Social Hx:  Lives in [place]. Living alone in apartment in long-term community. Previously ,  passed 2009. No children. No tobacco, no alcohol, no recreational drugs. Used to work for [employer].     Past Medical History:   Diagnosis Date    Hx of blood clots 2015    Hypertension        Past Surgical History:   Procedure Laterality Date    COLONOSCOPY  2006    COLONOSCOPY  4/19/16    colon polyp    EYE SURGERY      cataracts bilateral    HYSTERECTOMY (CERVIX STATUS UNKNOWN)         Family History   Problem Relation Age of Onset    Cancer Mother     Breast Cancer Mother     Coronary Art Dis Father         No Known Allergies    Immunization History   Administered Date(s) Administered    COVID-19, MODERNA BLUE border, Primary or Immunocompromised, (age 12y+), IM, 100 mcg/0.5mL 02/23/2021, 03/23/2021, 11/10/2021    COVID-19, PFIZER Bivalent, DO NOT Dilute, (age 12y+), IM, 30 mcg/0.3 mL 10/13/2022    Influenza Vaccine,

## 2025-03-17 DIAGNOSIS — I10 ESSENTIAL HYPERTENSION: ICD-10-CM

## 2025-03-17 DIAGNOSIS — Z86.711 HISTORY OF PULMONARY EMBOLISM: ICD-10-CM

## 2025-03-17 DIAGNOSIS — I27.20 PULMONARY HYPERTENSION (HCC): ICD-10-CM

## 2025-03-17 RX ORDER — LOSARTAN POTASSIUM 25 MG/1
25 TABLET ORAL DAILY
Qty: 90 TABLET | Refills: 1 | Status: SHIPPED | OUTPATIENT
Start: 2025-03-17 | End: 2025-03-17 | Stop reason: SDUPTHER

## 2025-03-17 RX ORDER — LABETALOL 200 MG/1
200 TABLET, FILM COATED ORAL 2 TIMES DAILY
Qty: 180 TABLET | Refills: 1 | Status: SHIPPED | OUTPATIENT
Start: 2025-03-17 | End: 2025-03-17 | Stop reason: SDUPTHER

## 2025-03-17 RX ORDER — AMLODIPINE BESYLATE 10 MG/1
10 TABLET ORAL DAILY
Qty: 90 TABLET | Refills: 1 | Status: SHIPPED | OUTPATIENT
Start: 2025-03-17 | End: 2025-03-17 | Stop reason: SDUPTHER

## 2025-03-17 RX ORDER — LOSARTAN POTASSIUM 25 MG/1
25 TABLET ORAL DAILY
Qty: 30 TABLET | Refills: 0 | Status: SHIPPED | OUTPATIENT
Start: 2025-03-17

## 2025-03-17 RX ORDER — AMLODIPINE BESYLATE 10 MG/1
10 TABLET ORAL DAILY
Qty: 30 TABLET | Refills: 0 | Status: SHIPPED | OUTPATIENT
Start: 2025-03-17

## 2025-03-17 RX ORDER — LABETALOL 200 MG/1
200 TABLET, FILM COATED ORAL 2 TIMES DAILY
Qty: 60 TABLET | Refills: 0 | Status: SHIPPED | OUTPATIENT
Start: 2025-03-17

## 2025-03-17 NOTE — TELEPHONE ENCOUNTER
Patient is calling and stated that she getting low on her medicine. She called the [facility]. Patient wants to know if she can get a 30 day prescriptions to get her through until her medicine arrives. She called [facility] and they told her they didn't get it last week when it was sent on 3-. Patient would want it sent to [facility] on [address].       Amlodipine 10 mg  Eliquis 5 mg  Labetalol 200 mg  Losartan 25 mg

## 2025-03-18 RX ORDER — AMLODIPINE BESYLATE 10 MG/1
10 TABLET ORAL DAILY
Qty: 90 TABLET | OUTPATIENT
Start: 2025-03-18

## 2025-03-19 ENCOUNTER — OFFICE VISIT (OUTPATIENT)
Dept: PULMONOLOGY | Age: 80
End: 2025-03-19
Payer: MEDICARE

## 2025-03-19 VITALS
OXYGEN SATURATION: 95 % | RESPIRATION RATE: 18 BRPM | BODY MASS INDEX: 37.66 KG/M2 | SYSTOLIC BLOOD PRESSURE: 146 MMHG | WEIGHT: 199.5 LBS | HEART RATE: 58 BPM | TEMPERATURE: 97.3 F | DIASTOLIC BLOOD PRESSURE: 76 MMHG | HEIGHT: 61 IN

## 2025-03-19 DIAGNOSIS — R91.8 PULMONARY NODULES: ICD-10-CM

## 2025-03-19 DIAGNOSIS — I27.20 PULMONARY HYPERTENSION (HCC): ICD-10-CM

## 2025-03-19 DIAGNOSIS — R06.02 SOB (SHORTNESS OF BREATH): ICD-10-CM

## 2025-03-19 DIAGNOSIS — Z86.711 HISTORY OF PULMONARY EMBOLISM: Primary | ICD-10-CM

## 2025-03-19 PROCEDURE — 1090F PRES/ABSN URINE INCON ASSESS: CPT | Performed by: STUDENT IN AN ORGANIZED HEALTH CARE EDUCATION/TRAINING PROGRAM

## 2025-03-19 PROCEDURE — 99213 OFFICE O/P EST LOW 20 MIN: CPT | Performed by: STUDENT IN AN ORGANIZED HEALTH CARE EDUCATION/TRAINING PROGRAM

## 2025-03-19 PROCEDURE — 3078F DIAST BP <80 MM HG: CPT | Performed by: STUDENT IN AN ORGANIZED HEALTH CARE EDUCATION/TRAINING PROGRAM

## 2025-03-19 PROCEDURE — 1036F TOBACCO NON-USER: CPT | Performed by: STUDENT IN AN ORGANIZED HEALTH CARE EDUCATION/TRAINING PROGRAM

## 2025-03-19 PROCEDURE — 3077F SYST BP >= 140 MM HG: CPT | Performed by: STUDENT IN AN ORGANIZED HEALTH CARE EDUCATION/TRAINING PROGRAM

## 2025-03-19 PROCEDURE — G8399 PT W/DXA RESULTS DOCUMENT: HCPCS | Performed by: STUDENT IN AN ORGANIZED HEALTH CARE EDUCATION/TRAINING PROGRAM

## 2025-03-19 PROCEDURE — G2211 COMPLEX E/M VISIT ADD ON: HCPCS | Performed by: STUDENT IN AN ORGANIZED HEALTH CARE EDUCATION/TRAINING PROGRAM

## 2025-03-19 PROCEDURE — 1159F MED LIST DOCD IN RCRD: CPT | Performed by: STUDENT IN AN ORGANIZED HEALTH CARE EDUCATION/TRAINING PROGRAM

## 2025-03-19 PROCEDURE — 1123F ACP DISCUSS/DSCN MKR DOCD: CPT | Performed by: STUDENT IN AN ORGANIZED HEALTH CARE EDUCATION/TRAINING PROGRAM

## 2025-03-19 PROCEDURE — G8427 DOCREV CUR MEDS BY ELIG CLIN: HCPCS | Performed by: STUDENT IN AN ORGANIZED HEALTH CARE EDUCATION/TRAINING PROGRAM

## 2025-03-19 PROCEDURE — G8417 CALC BMI ABV UP PARAM F/U: HCPCS | Performed by: STUDENT IN AN ORGANIZED HEALTH CARE EDUCATION/TRAINING PROGRAM

## 2025-03-19 ASSESSMENT — ENCOUNTER SYMPTOMS
VOMITING: 0
BACK PAIN: 0
DIARRHEA: 0
EYE ITCHING: 0
EYE REDNESS: 0
COLOR CHANGE: 0
STRIDOR: 0
ABDOMINAL DISTENTION: 0
COUGH: 0
TROUBLE SWALLOWING: 0
ABDOMINAL PAIN: 0
EYE DISCHARGE: 0
NAUSEA: 0
SORE THROAT: 0
WHEEZING: 0
SHORTNESS OF BREATH: 1
EYE PAIN: 0
CONSTIPATION: 0

## 2025-03-19 NOTE — PATIENT INSTRUCTIONS
Remember to bring a list of pulmonary medications and any CPAP or BiPAP machines to your next appointment with the office.     Please keep all of your future appointments scheduled by [facility] [place] [facility] office. Out of respect for other patients and providers, you may be asked to reschedule your appointment if you arrive later than your scheduled appointment time. Appointments cancelled less than 24hrs in advance will be considered a no show. Patients with three missed appointments within 1 year or four missed appointments within 2 years can be dismissed from the practice.     Please be aware that our physicians are required to work in the Intensive Care Unit at [facility].  Your appointment may need to be rescheduled if they are designated to work during your appointment time.      You may receive a survey regarding the care you received during your visit.  Your input is valuable to us.  We encourage you to complete and return your survey.  We hope you will choose us in the future for your healthcare needs.     Pt instructed of all future appointment dates & times, including radiology, labs, procedures & referrals. If procedures were scheduled preparation instructions provided. Instructions on future appointments with [facility] were given.     In the next few weeks, you will be receiving a survey from [facility] regarding your visit today.  We would greatly appreciate it if you would take just a few minutes to fill that out.  It is very important to us that our patients receive top notch care and our surveys help keep us accountable. However, if your experience was not a good one, we want to hear about that as well. This is a key way we can keep track of problems and strive to correct any for future visits.    Again, we appreciate your time and thank you for choosing [facility]!    Roxana LEWIS

## 2025-03-19 NOTE — PROGRESS NOTES
MA Communication:  The following orders are received by verbal communication from   Charles Goldsmith MD    Orders include:  CTPE/ Stat       FU next week NP        
Eliquis 5mg PO BID  - Repeat CTPE with chest pressure and severe SOB with exertion. She may also have infectious component causing her symptoms. She has been on Eliquis and has not missed doses  - ECHO from prior with no high right-sided pressures  - f/u next week with KAREEN Rhodes-CNP    Charles Goldsmith MD, [facility]

## 2025-03-20 ENCOUNTER — HOSPITAL ENCOUNTER (OUTPATIENT)
Dept: CT IMAGING | Age: 80
Discharge: HOME | End: 2025-03-20
Payer: MEDICARE

## 2025-03-20 DIAGNOSIS — R06.02 SOB (SHORTNESS OF BREATH): ICD-10-CM

## 2025-03-20 DIAGNOSIS — Z86.711 HISTORY OF PULMONARY EMBOLISM: ICD-10-CM

## 2025-03-20 LAB
PERFORMED ON: NORMAL
POC CREATININE: 0.8 MG/DL (ref 0.6–1.2)
POC SAMPLE TYPE: NORMAL

## 2025-03-20 PROCEDURE — 6360000004 HC RX CONTRAST MEDICATION: Performed by: STUDENT IN AN ORGANIZED HEALTH CARE EDUCATION/TRAINING PROGRAM

## 2025-03-20 PROCEDURE — 71260 CT THORAX DX C+: CPT

## 2025-03-20 PROCEDURE — 82565 ASSAY OF CREATININE: CPT

## 2025-03-20 RX ORDER — IOPAMIDOL 755 MG/ML
75 INJECTION, SOLUTION INTRAVASCULAR
Status: COMPLETED | OUTPATIENT
Start: 2025-03-20 | End: 2025-03-20

## 2025-03-20 RX ADMIN — IOPAMIDOL 75 ML: 755 INJECTION, SOLUTION INTRAVENOUS at 11:12

## 2025-03-28 ENCOUNTER — OFFICE VISIT (OUTPATIENT)
Dept: PULMONOLOGY | Age: 80
End: 2025-03-28
Payer: MEDICARE

## 2025-03-28 VITALS
OXYGEN SATURATION: 96 % | HEART RATE: 64 BPM | HEIGHT: 61 IN | WEIGHT: 198 LBS | RESPIRATION RATE: 16 BRPM | TEMPERATURE: 97.7 F | DIASTOLIC BLOOD PRESSURE: 78 MMHG | SYSTOLIC BLOOD PRESSURE: 162 MMHG | BODY MASS INDEX: 37.38 KG/M2

## 2025-03-28 DIAGNOSIS — R06.02 SOB (SHORTNESS OF BREATH): Primary | ICD-10-CM

## 2025-03-28 DIAGNOSIS — I10 ESSENTIAL HYPERTENSION: ICD-10-CM

## 2025-03-28 DIAGNOSIS — E66.01 CLASS 2 SEVERE OBESITY DUE TO EXCESS CALORIES WITH SERIOUS COMORBIDITY AND BODY MASS INDEX (BMI) OF 37.0 TO 37.9 IN ADULT: ICD-10-CM

## 2025-03-28 DIAGNOSIS — E66.812 CLASS 2 SEVERE OBESITY DUE TO EXCESS CALORIES WITH SERIOUS COMORBIDITY AND BODY MASS INDEX (BMI) OF 37.0 TO 37.9 IN ADULT: ICD-10-CM

## 2025-03-28 DIAGNOSIS — Z86.711 HISTORY OF PULMONARY EMBOLISM: ICD-10-CM

## 2025-03-28 PROCEDURE — 99214 OFFICE O/P EST MOD 30 MIN: CPT | Performed by: NURSE PRACTITIONER

## 2025-03-28 PROCEDURE — 1159F MED LIST DOCD IN RCRD: CPT | Performed by: NURSE PRACTITIONER

## 2025-03-28 PROCEDURE — G8427 DOCREV CUR MEDS BY ELIG CLIN: HCPCS | Performed by: NURSE PRACTITIONER

## 2025-03-28 PROCEDURE — G8417 CALC BMI ABV UP PARAM F/U: HCPCS | Performed by: NURSE PRACTITIONER

## 2025-03-28 PROCEDURE — G8399 PT W/DXA RESULTS DOCUMENT: HCPCS | Performed by: NURSE PRACTITIONER

## 2025-03-28 PROCEDURE — 1036F TOBACCO NON-USER: CPT | Performed by: NURSE PRACTITIONER

## 2025-03-28 PROCEDURE — 1123F ACP DISCUSS/DSCN MKR DOCD: CPT | Performed by: NURSE PRACTITIONER

## 2025-03-28 PROCEDURE — 3078F DIAST BP <80 MM HG: CPT | Performed by: NURSE PRACTITIONER

## 2025-03-28 PROCEDURE — 3077F SYST BP >= 140 MM HG: CPT | Performed by: NURSE PRACTITIONER

## 2025-03-28 PROCEDURE — 1090F PRES/ABSN URINE INCON ASSESS: CPT | Performed by: NURSE PRACTITIONER

## 2025-03-28 ASSESSMENT — ENCOUNTER SYMPTOMS
SHORTNESS OF BREATH: 1
SORE THROAT: 0
RHINORRHEA: 0
WHEEZING: 1
COUGH: 1
CHEST TIGHTNESS: 0
EYE PAIN: 0
ABDOMINAL PAIN: 0

## 2025-03-28 NOTE — PROGRESS NOTES
MA Communication:  The following orders are received by verbal communication from Debbie Maloney CNP    Orders include:    Pft  Sollow up    
Start: 12:18 PM     Study Location: [facility] - Echo Lab  Technical Quality: Poor visualization     Additional Indications:severe right heart strain and PHTN due to saddle  pulmonary embolism, r/o residual pulmonary hypertension. .     Patient Status: Routine     Height: 64 inches Weight: 205 pounds BSA: 1.98 m2 BMI: 35.19 kg/m2     BP: 134/88 mmHg      Conclusions      Summary   Technically difficult examination.   Left ventricular systolic function is hyperdynamic with an estimated   ejection fraction of >= 65%. The left ventricle is normal in size with   normal wall thickness. No regional wall motion abnormalities are noted.   Grade I diastolic dysfunction with normal LV pressure.   Mild mitral annular calcification.   Mild mitral and tricuspid regurgitation.   Systolic pulmonary artery pressure (SPAP) is normal and estimated at 28 mmHg   (right atrial pressure 8 mmHg).      Physical Exam  Vitals reviewed.   Constitutional:       General: She is not in acute distress.     Appearance: Normal appearance. She is obese. She is not ill-appearing, toxic-appearing or diaphoretic.   HENT:      Head: Normocephalic and atraumatic.      Nose: Nose normal. No congestion or rhinorrhea.      Mouth/Throat:      Mouth: Mucous membranes are moist.      Pharynx: Oropharynx is clear. No oropharyngeal exudate or posterior oropharyngeal erythema.   Eyes:      Pupils: Pupils are equal, round, and reactive to light.   Cardiovascular:      Rate and Rhythm: Normal rate.   Pulmonary:      Effort: Pulmonary effort is normal. No respiratory distress.      Breath sounds: Normal breath sounds. No stridor. No wheezing, rhonchi or rales.   Chest:      Chest wall: No tenderness.   Abdominal:      Palpations: Abdomen is soft.      Tenderness: There is no abdominal tenderness. There is no guarding or rebound.   Musculoskeletal:         General: Normal range of motion.      Cervical back: Neck supple.      Right lower leg: Edema

## 2025-03-28 NOTE — PATIENT INSTRUCTIONS
In the next few weeks, you will be receiving a survey from [employer] regarding your visit today.  We would greatly appreciate it if you would take just a few minutes to fill that out.  It is very important to us that our patients receive top notch care and our surveys help keep us accountable. However, if your experience was not a good one, we want to hear about that as well. This is a key way we can keep track of problems and strive to correct any for future visits.    Again, we appreciate your time and thank you for choosing [facility]!    MAYO Reynolds  Call with worsening symptoms such as increased shortness of breath, productive cough, wheezing or symptoms not responding to treatment plan.     Blood pressure today is elevated. Continue to monitor, continue current medication regimen per PCP.    PFT      Follow up after testing completed.

## 2025-04-03 ENCOUNTER — HOSPITAL ENCOUNTER (OUTPATIENT)
Dept: PULMONOLOGY | Age: 80
Discharge: HOME | End: 2025-04-03
Payer: MEDICARE

## 2025-04-03 ENCOUNTER — RESULTS FOLLOW-UP (OUTPATIENT)
Dept: PULMONOLOGY | Age: 80
End: 2025-04-03

## 2025-04-03 VITALS — OXYGEN SATURATION: 98 %

## 2025-04-03 DIAGNOSIS — R06.02 SOB (SHORTNESS OF BREATH): ICD-10-CM

## 2025-04-03 LAB
DLCO %PRED: 111 %
DLCO PRED: NORMAL
DLCO/VA %PRED: NORMAL
DLCO/VA PRED: NORMAL
DLCO/VA: NORMAL
DLCO: NORMAL
EXPIRATORY TIME-POST: NORMAL
EXPIRATORY TIME: NORMAL
FEF 25-75 %CHNG: NORMAL
FEF 25-75 POST %PRED: NORMAL
FEF 25-75% %PRED-PRE: NORMAL
FEF 25-75% PRED: NORMAL
FEF 25-75-POST: NORMAL
FEF 25-75-PRE: NORMAL
FEV1 %PRED-POST: 61 %
FEV1 %PRED-PRE: 59 %
FEV1 PRED: NORMAL
FEV1-POST: NORMAL
FEV1-PRE: NORMAL
FEV1/FVC %PRED-POST: 86 %
FEV1/FVC %PRED-PRE: 89 %
FEV1/FVC PRED: NORMAL
FEV1/FVC-POST: NORMAL
FEV1/FVC-PRE: NORMAL
FVC %PRED-POST: 70 L
FVC %PRED-PRE: 66 %
FVC PRED: NORMAL
FVC-POST: NORMAL
FVC-PRE: NORMAL
GAW %PRED: NORMAL
GAW PRED: NORMAL
GAW: NORMAL
IC PRE %PRED: NORMAL
IC PRED: NORMAL
IC: NORMAL
MEP: NORMAL
MIP: NORMAL
MVV %PRED-PRE: NORMAL
MVV PRED: NORMAL
MVV-PRE: NORMAL
PEF %PRED-POST: NORMAL
PEF %PRED-PRE: NORMAL
PEF PRED: NORMAL
PEF%CHNG: NORMAL
PEF-POST: NORMAL
PEF-PRE: NORMAL
RAW %PRED: NORMAL
RAW PRED: NORMAL
RAW: NORMAL
RV PRE %PRED: NORMAL
RV PRED: NORMAL
RV: NORMAL
SVC %PRED: NORMAL
SVC PRED: NORMAL
SVC: NORMAL
TLC PRE %PRED: 87 %
TLC PRED: NORMAL
TLC: NORMAL
VA %PRED: NORMAL
VA PRED: NORMAL
VA: NORMAL
VTG %PRED: NORMAL
VTG PRED: NORMAL
VTG: NORMAL

## 2025-04-03 PROCEDURE — 94726 PLETHYSMOGRAPHY LUNG VOLUMES: CPT

## 2025-04-03 PROCEDURE — 94060 EVALUATION OF WHEEZING: CPT

## 2025-04-03 PROCEDURE — 94760 N-INVAS EAR/PLS OXIMETRY 1: CPT

## 2025-04-03 PROCEDURE — 6370000000 HC RX 637 (ALT 250 FOR IP): Performed by: NURSE PRACTITIONER

## 2025-04-03 PROCEDURE — 94729 DIFFUSING CAPACITY: CPT

## 2025-04-03 RX ORDER — ALBUTEROL SULFATE 90 UG/1
4 INHALANT RESPIRATORY (INHALATION) ONCE
Status: COMPLETED | OUTPATIENT
Start: 2025-04-03 | End: 2025-04-03

## 2025-04-03 RX ORDER — ALBUTEROL SULFATE 90 UG/1
4 INHALANT RESPIRATORY (INHALATION) ONCE
Status: DISCONTINUED | OUTPATIENT
Start: 2025-04-03 | End: 2025-04-04 | Stop reason: HOSPADM

## 2025-04-03 RX ADMIN — Medication 4 PUFF: at 14:23

## 2025-04-03 ASSESSMENT — PULMONARY FUNCTION TESTS
FEV1_PERCENT_PREDICTED_PRE: 59
FVC_PERCENT_PREDICTED_PRE: 66
FVC_PERCENT_PREDICTED_POST: 70
FEV1/FVC_PERCENT_PREDICTED_PRE: 89
FEV1_PERCENT_PREDICTED_POST: 61
FEV1/FVC_PERCENT_PREDICTED_POST: 86

## 2025-04-10 ENCOUNTER — OFFICE VISIT (OUTPATIENT)
Dept: PULMONOLOGY | Age: 80
End: 2025-04-10
Payer: MEDICARE

## 2025-04-10 VITALS
OXYGEN SATURATION: 97 % | SYSTOLIC BLOOD PRESSURE: 168 MMHG | HEIGHT: 61 IN | WEIGHT: 185 LBS | HEART RATE: 61 BPM | TEMPERATURE: 97.6 F | DIASTOLIC BLOOD PRESSURE: 77 MMHG | RESPIRATION RATE: 16 BRPM | BODY MASS INDEX: 34.93 KG/M2

## 2025-04-10 DIAGNOSIS — R06.02 SOB (SHORTNESS OF BREATH): ICD-10-CM

## 2025-04-10 DIAGNOSIS — I27.20 PULMONARY HYPERTENSION (HCC): ICD-10-CM

## 2025-04-10 DIAGNOSIS — J44.9 OBSTRUCTIVE AIRWAY DISEASE (HCC): Primary | ICD-10-CM

## 2025-04-10 DIAGNOSIS — I10 ESSENTIAL HYPERTENSION: ICD-10-CM

## 2025-04-10 DIAGNOSIS — Z86.711 HISTORY OF PULMONARY EMBOLISM: ICD-10-CM

## 2025-04-10 PROCEDURE — 3023F SPIROM DOC REV: CPT | Performed by: NURSE PRACTITIONER

## 2025-04-10 PROCEDURE — G8427 DOCREV CUR MEDS BY ELIG CLIN: HCPCS | Performed by: NURSE PRACTITIONER

## 2025-04-10 PROCEDURE — 1090F PRES/ABSN URINE INCON ASSESS: CPT | Performed by: NURSE PRACTITIONER

## 2025-04-10 PROCEDURE — 3077F SYST BP >= 140 MM HG: CPT | Performed by: NURSE PRACTITIONER

## 2025-04-10 PROCEDURE — 1159F MED LIST DOCD IN RCRD: CPT | Performed by: NURSE PRACTITIONER

## 2025-04-10 PROCEDURE — 99214 OFFICE O/P EST MOD 30 MIN: CPT | Performed by: NURSE PRACTITIONER

## 2025-04-10 PROCEDURE — 95012 NITRIC OXIDE EXP GAS DETER: CPT | Performed by: NURSE PRACTITIONER

## 2025-04-10 PROCEDURE — 3078F DIAST BP <80 MM HG: CPT | Performed by: NURSE PRACTITIONER

## 2025-04-10 PROCEDURE — 1036F TOBACCO NON-USER: CPT | Performed by: NURSE PRACTITIONER

## 2025-04-10 PROCEDURE — G8399 PT W/DXA RESULTS DOCUMENT: HCPCS | Performed by: NURSE PRACTITIONER

## 2025-04-10 PROCEDURE — 1123F ACP DISCUSS/DSCN MKR DOCD: CPT | Performed by: NURSE PRACTITIONER

## 2025-04-10 PROCEDURE — G8417 CALC BMI ABV UP PARAM F/U: HCPCS | Performed by: NURSE PRACTITIONER

## 2025-04-10 RX ORDER — FLUTICASONE FUROATE 100 UG/1
1 POWDER RESPIRATORY (INHALATION) DAILY
Qty: 1 EACH | Refills: 3 | Status: SHIPPED | OUTPATIENT
Start: 2025-04-10

## 2025-04-10 RX ORDER — ALBUTEROL SULFATE 90 UG/1
2 INHALANT RESPIRATORY (INHALATION) 4 TIMES DAILY PRN
Qty: 18 G | Refills: 2 | Status: SHIPPED | OUTPATIENT
Start: 2025-04-10

## 2025-04-10 ASSESSMENT — ENCOUNTER SYMPTOMS
SHORTNESS OF BREATH: 0
ABDOMINAL PAIN: 0
WHEEZING: 0
EYE PAIN: 0
CHEST TIGHTNESS: 0
COUGH: 0
SORE THROAT: 0
RHINORRHEA: 0

## 2025-04-10 NOTE — PATIENT INSTRUCTIONS
Call with worsening symptoms such as increased shortness of breath, productive cough, wheezing or symptoms not responding to treatment plan.     Start Arnuity 100 1 puff daily. Rinse mouth out after use to prevent hoarseness and thrush.     May use albuterol 2 puffs up to 4 times a day as needed     Blood pressure today is elevated, continue current medication regimen per PCP.  Continue to monitor.     Return to clinic in 3 months     Remember to bring a list of pulmonary medications and any CPAP or BiPAP machines to your next appointment with the office.     Please keep all of your future appointments scheduled by [facility] office. Out of respect for other patients and providers, you may be asked to reschedule your appointment if you arrive later than your scheduled appointment time. Appointments cancelled less than 24hrs in advance will be considered a no show. Patients with three missed appointments within 1 year or four missed appointments within 2 years can be dismissed from the practice.     Please be aware that our physicians are required to work in the Intensive Care Unit at [facility].  Your appointment may need to be rescheduled if they are designated to work during your appointment time.      You may receive a survey regarding the care you received during your visit.  Your input is valuable to us.  We encourage you to complete and return your survey.  We hope you will choose us in the future for your healthcare needs.     Pt instructed of all future appointment dates & times, including radiology, labs, procedures & referrals. If procedures were scheduled preparation instructions provided. Instructions on future appointments with [facility] were given.     In the next few weeks, you will be receiving a survey from [facility] [place] regarding your visit today.  We would greatly appreciate it if you would take just a few minutes to fill that out.  It is

## 2025-04-10 NOTE — TELEPHONE ENCOUNTER
Refill request for LABETALOL 200MG TABLETS, AMLODIPINE BESYLATE 10MG TABLETS, ELIQUIS 5MG TABLETS, LOSARTAN 25MG TABLETS medication.     [facility] of [facility]- [facility]      Last visit - 3-     Pending visit - 4-    Last refill - 3-      Medication Contract signed -   Last Oarrs ran-         Additional Comments

## 2025-04-11 RX ORDER — LABETALOL 200 MG/1
200 TABLET, FILM COATED ORAL 2 TIMES DAILY
Qty: 180 TABLET | Refills: 1 | Status: SHIPPED | OUTPATIENT
Start: 2025-04-11

## 2025-04-11 RX ORDER — APIXABAN 5 MG/1
5 TABLET, FILM COATED ORAL 2 TIMES DAILY
Qty: 180 TABLET | Refills: 1 | Status: SHIPPED | OUTPATIENT
Start: 2025-04-11

## 2025-04-11 RX ORDER — LOSARTAN POTASSIUM 25 MG/1
25 TABLET ORAL DAILY
Qty: 90 TABLET | Refills: 1 | Status: SHIPPED | OUTPATIENT
Start: 2025-04-11

## 2025-04-11 RX ORDER — AMLODIPINE BESYLATE 10 MG/1
10 TABLET ORAL DAILY
Qty: 90 TABLET | Refills: 1 | Status: SHIPPED | OUTPATIENT
Start: 2025-04-11

## 2025-04-13 NOTE — PROGRESS NOTES
Medicare Annual Wellness Visit    Amada Daniels is here for Medicare AWV    Assessment & Plan   Medicare annual wellness visit, subsequent  Essential hypertension  Increase losartan to 50 mg once a day.    -     losartan (COZAAR) 50 MG tablet; Take 1 tablet by mouth daily, Disp-90 tablet, R-1Normal  -     amLODIPine (NORVASC) 10 MG tablet; Take 1 tablet by mouth daily, Disp-90 tablet, R-1Normal  -     labetalol (NORMODYNE) 200 MG tablet; Take 1 tablet by mouth 2 times daily, Disp-180 tablet, R-1Normal  Pulmonary hypertension (HCC)  -     amLODIPine (NORVASC) 10 MG tablet; Take 1 tablet by mouth daily, Disp-90 tablet, R-1Normal  -     labetalol (NORMODYNE) 200 MG tablet; Take 1 tablet by mouth 2 times daily, Disp-180 tablet, R-1Normal  History of pulmonary embolism  -     apixaban (ELIQUIS) 5 MG TABS tablet; Take 1 tablet by mouth 2 times daily, Disp-180 tablet, R-1Normal  Obstructive airway disease (HCC)  Symptoms greatly improved on inhaler.    -     fluticasone (ARNUITY ELLIPTA) 100 MCG/ACT AEPB; Inhale 1 puff into the lungs daily Rinse mouth out after use, Disp-1 each, R-3Normal       Return in 6 months (on 10/14/2025).     Subjective     Patient has been doing well, denies any acute complaints during this visit.  States since being on the inhaler she feels greatly improved.  Did have some concerns that she was not receiving the medicine when using the inhaler, checked her technique with the local pharmacist who states she is doing it appropriately.    Patient's complete Health Risk Assessment and screening values have been reviewed and are found in Flowsheets. The following problems were reviewed today and where indicated follow up appointments were made and/or referrals ordered.    Positive Risk Factor Screenings with Interventions:     Cognitive:   Clock Drawing Test (CDT): (!) Abnormal  Words recalled: 1 Word Recalled  Total Score: (!) 1  Total Score Interpretation: Abnormal

## 2025-04-14 ENCOUNTER — OFFICE VISIT (OUTPATIENT)
Dept: INTERNAL MEDICINE CLINIC | Age: 80
End: 2025-04-14
Payer: MEDICARE

## 2025-04-14 VITALS
OXYGEN SATURATION: 97 % | TEMPERATURE: 97 F | BODY MASS INDEX: 36.58 KG/M2 | DIASTOLIC BLOOD PRESSURE: 70 MMHG | HEART RATE: 60 BPM | HEIGHT: 62 IN | WEIGHT: 198.8 LBS | SYSTOLIC BLOOD PRESSURE: 142 MMHG

## 2025-04-14 DIAGNOSIS — J44.9 OBSTRUCTIVE AIRWAY DISEASE (HCC): ICD-10-CM

## 2025-04-14 DIAGNOSIS — Z86.711 HISTORY OF PULMONARY EMBOLISM: ICD-10-CM

## 2025-04-14 DIAGNOSIS — I10 ESSENTIAL HYPERTENSION: ICD-10-CM

## 2025-04-14 DIAGNOSIS — Z00.00 MEDICARE ANNUAL WELLNESS VISIT, SUBSEQUENT: Primary | ICD-10-CM

## 2025-04-14 DIAGNOSIS — I27.20 PULMONARY HYPERTENSION (HCC): ICD-10-CM

## 2025-04-14 PROCEDURE — 3077F SYST BP >= 140 MM HG: CPT

## 2025-04-14 PROCEDURE — 1123F ACP DISCUSS/DSCN MKR DOCD: CPT

## 2025-04-14 PROCEDURE — 1159F MED LIST DOCD IN RCRD: CPT

## 2025-04-14 PROCEDURE — G0439 PPPS, SUBSEQ VISIT: HCPCS

## 2025-04-14 PROCEDURE — 1160F RVW MEDS BY RX/DR IN RCRD: CPT

## 2025-04-14 PROCEDURE — 3078F DIAST BP <80 MM HG: CPT

## 2025-04-14 RX ORDER — LOSARTAN POTASSIUM 50 MG/1
50 TABLET ORAL DAILY
Qty: 90 TABLET | Refills: 1 | Status: SHIPPED | OUTPATIENT
Start: 2025-04-14

## 2025-04-14 RX ORDER — AMLODIPINE BESYLATE 10 MG/1
10 TABLET ORAL DAILY
Qty: 90 TABLET | Refills: 1 | Status: SHIPPED | OUTPATIENT
Start: 2025-04-14

## 2025-04-14 RX ORDER — FLUTICASONE FUROATE 100 UG/1
1 POWDER RESPIRATORY (INHALATION) DAILY
Qty: 1 EACH | Refills: 3 | Status: SHIPPED | OUTPATIENT
Start: 2025-04-14

## 2025-04-14 RX ORDER — LABETALOL 200 MG/1
200 TABLET, FILM COATED ORAL 2 TIMES DAILY
Qty: 180 TABLET | Refills: 1 | Status: SHIPPED | OUTPATIENT
Start: 2025-04-14

## 2025-04-14 SDOH — ECONOMIC STABILITY: FOOD INSECURITY: WITHIN THE PAST 12 MONTHS, THE FOOD YOU BOUGHT JUST DIDN'T LAST AND YOU DIDN'T HAVE MONEY TO GET MORE.: NEVER TRUE

## 2025-04-14 SDOH — ECONOMIC STABILITY: FOOD INSECURITY: WITHIN THE PAST 12 MONTHS, YOU WORRIED THAT YOUR FOOD WOULD RUN OUT BEFORE YOU GOT MONEY TO BUY MORE.: NEVER TRUE

## 2025-04-14 ASSESSMENT — PATIENT HEALTH QUESTIONNAIRE - PHQ9
1. LITTLE INTEREST OR PLEASURE IN DOING THINGS: NOT AT ALL
SUM OF ALL RESPONSES TO PHQ QUESTIONS 1-9: 0
2. FEELING DOWN, DEPRESSED OR HOPELESS: NOT AT ALL
SUM OF ALL RESPONSES TO PHQ QUESTIONS 1-9: 0

## 2025-04-14 NOTE — PATIENT INSTRUCTIONS
- increase losartan to 50 mg once daily     Starting a Weight-Loss Plan: Care Instructions  Overview    It can be a challenge to lose weight. But your doctor can help you make a weight-loss plan that meets your needs.  You don't have to make a lot of big changes at once. A better idea might be to focus on small changes and stick with them. When those changes become habit, you can add a few more changes.  Some people find it helpful to take an exercise or nutrition class. If you have questions, ask your doctor about seeing a registered dietitian or an exercise specialist. You might also think about joining a weight-loss support group.  If you're not ready to make changes right now, try to pick a date in the future. Then make an appointment with your doctor to talk about when and how you'll get started with a plan.  Follow-up care is a key part of your treatment and safety. Be sure to make and go to all appointments, and call your doctor if you are having problems. It's also a good idea to know your test results and keep a list of the medicines you take.  How can you care for yourself as you start a weight-loss plan?  Set realistic goals. Many people expect to lose much more weight than is likely. A weight loss of 5% to 10% of your body weight may be enough to improve your health.  Get family and friends involved to provide support. Talk to them about why you are trying to lose weight, and ask them to help. They can help by participating in exercise and having meals with you, even if they may be eating something different.  Find what works best for you. If you do not have time or do not like to cook, a program that offers meal replacement bars or shakes may be better for you. Or if you like to prepare meals, finding a plan that includes daily menus and recipes may be best.  Ask your doctor about other health professionals who can help you achieve your weight-loss goals.  A dietitian can help you make healthy changes

## 2025-04-21 DIAGNOSIS — I27.20 PULMONARY HYPERTENSION (HCC): ICD-10-CM

## 2025-04-21 DIAGNOSIS — Z86.711 HISTORY OF PULMONARY EMBOLISM: ICD-10-CM

## 2025-04-21 DIAGNOSIS — I10 ESSENTIAL HYPERTENSION: ICD-10-CM

## 2025-04-21 DIAGNOSIS — J44.9 OBSTRUCTIVE AIRWAY DISEASE (HCC): ICD-10-CM

## 2025-04-21 RX ORDER — LABETALOL 200 MG/1
200 TABLET, FILM COATED ORAL 2 TIMES DAILY
Qty: 180 TABLET | Refills: 1 | Status: SHIPPED | OUTPATIENT
Start: 2025-04-21

## 2025-04-21 RX ORDER — AMLODIPINE BESYLATE 10 MG/1
10 TABLET ORAL DAILY
Qty: 90 TABLET | Refills: 1 | Status: SHIPPED | OUTPATIENT
Start: 2025-04-21

## 2025-04-21 RX ORDER — FLUTICASONE FUROATE 100 UG/1
1 POWDER RESPIRATORY (INHALATION) DAILY
Qty: 1 EACH | Refills: 3 | Status: SHIPPED | OUTPATIENT
Start: 2025-04-21

## 2025-04-21 RX ORDER — LOSARTAN POTASSIUM 50 MG/1
50 TABLET ORAL DAILY
Qty: 90 TABLET | Refills: 1 | Status: SHIPPED | OUTPATIENT
Start: 2025-04-21

## 2025-04-21 NOTE — TELEPHONE ENCOUNTER
Refill request for LABETOLOL medication.     Name of Pharmacy- [facility]      Last visit - 4-14-25     Pending visit - 10-14-25    Last refill - PREVIOUS PROVIDER      Medication Contract signed -   Last Oarrs ran-         Additional Comments PATIENT IS OUT OF MEDICATION

## 2025-04-21 NOTE — TELEPHONE ENCOUNTER
PATIENT WAS JUST IN OFFICE 4/14 AND HER MEDICATIONS WERE SENT TO MAIL ORDER.  THEY WERE SENT TO THE INCORRECT MAIL ORDER.     PENDED MEDICATIONS WITH [facility]    AMLODIPINE  APIXABAN  FLUTICASONE  LOSARTAN

## 2025-05-06 DIAGNOSIS — I27.20 PULMONARY HYPERTENSION (HCC): ICD-10-CM

## 2025-05-06 DIAGNOSIS — J44.9 OBSTRUCTIVE AIRWAY DISEASE (HCC): ICD-10-CM

## 2025-05-06 DIAGNOSIS — I10 ESSENTIAL HYPERTENSION: ICD-10-CM

## 2025-05-06 RX ORDER — LOSARTAN POTASSIUM 50 MG/1
50 TABLET ORAL DAILY
Qty: 90 TABLET | Refills: 1 | Status: SHIPPED | OUTPATIENT
Start: 2025-05-06

## 2025-05-06 RX ORDER — AMLODIPINE BESYLATE 10 MG/1
10 TABLET ORAL DAILY
Qty: 90 TABLET | Refills: 1 | Status: SHIPPED | OUTPATIENT
Start: 2025-05-06

## 2025-05-06 RX ORDER — FLUTICASONE FUROATE 100 UG/1
1 POWDER RESPIRATORY (INHALATION) DAILY
Qty: 1 EACH | Refills: 3 | Status: SHIPPED | OUTPATIENT
Start: 2025-05-06

## 2025-05-06 NOTE — TELEPHONE ENCOUNTER
Patient is calling today asking if she can have her prescriptions sent to her local pharmacy.  She has not been able to get her last refills d/t having difficulty setting up an account with [facility]    Patient would like -   Amlodipine  Fluticasone  Losartan    Pended prescriptions to [facility]

## 2025-07-22 ENCOUNTER — OFFICE VISIT (OUTPATIENT)
Dept: PULMONOLOGY | Age: 80
End: 2025-07-22
Payer: MEDICARE

## 2025-07-22 VITALS
HEART RATE: 82 BPM | DIASTOLIC BLOOD PRESSURE: 89 MMHG | TEMPERATURE: 98 F | OXYGEN SATURATION: 98 % | WEIGHT: 194 LBS | BODY MASS INDEX: 35.7 KG/M2 | SYSTOLIC BLOOD PRESSURE: 168 MMHG | RESPIRATION RATE: 16 BRPM | HEIGHT: 62 IN

## 2025-07-22 DIAGNOSIS — J45.30 MILD PERSISTENT ASTHMA WITHOUT COMPLICATION: Primary | ICD-10-CM

## 2025-07-22 DIAGNOSIS — Z86.711 HISTORY OF PULMONARY EMBOLISM: ICD-10-CM

## 2025-07-22 DIAGNOSIS — R91.8 PULMONARY NODULES: ICD-10-CM

## 2025-07-22 DIAGNOSIS — I27.20 PULMONARY HYPERTENSION (HCC): ICD-10-CM

## 2025-07-22 PROCEDURE — 1090F PRES/ABSN URINE INCON ASSESS: CPT | Performed by: STUDENT IN AN ORGANIZED HEALTH CARE EDUCATION/TRAINING PROGRAM

## 2025-07-22 PROCEDURE — G8417 CALC BMI ABV UP PARAM F/U: HCPCS | Performed by: STUDENT IN AN ORGANIZED HEALTH CARE EDUCATION/TRAINING PROGRAM

## 2025-07-22 PROCEDURE — G2211 COMPLEX E/M VISIT ADD ON: HCPCS | Performed by: STUDENT IN AN ORGANIZED HEALTH CARE EDUCATION/TRAINING PROGRAM

## 2025-07-22 PROCEDURE — 1123F ACP DISCUSS/DSCN MKR DOCD: CPT | Performed by: STUDENT IN AN ORGANIZED HEALTH CARE EDUCATION/TRAINING PROGRAM

## 2025-07-22 PROCEDURE — 99213 OFFICE O/P EST LOW 20 MIN: CPT | Performed by: STUDENT IN AN ORGANIZED HEALTH CARE EDUCATION/TRAINING PROGRAM

## 2025-07-22 PROCEDURE — 3079F DIAST BP 80-89 MM HG: CPT | Performed by: STUDENT IN AN ORGANIZED HEALTH CARE EDUCATION/TRAINING PROGRAM

## 2025-07-22 PROCEDURE — G8399 PT W/DXA RESULTS DOCUMENT: HCPCS | Performed by: STUDENT IN AN ORGANIZED HEALTH CARE EDUCATION/TRAINING PROGRAM

## 2025-07-22 PROCEDURE — 1036F TOBACCO NON-USER: CPT | Performed by: STUDENT IN AN ORGANIZED HEALTH CARE EDUCATION/TRAINING PROGRAM

## 2025-07-22 PROCEDURE — 3077F SYST BP >= 140 MM HG: CPT | Performed by: STUDENT IN AN ORGANIZED HEALTH CARE EDUCATION/TRAINING PROGRAM

## 2025-07-22 PROCEDURE — 1159F MED LIST DOCD IN RCRD: CPT | Performed by: STUDENT IN AN ORGANIZED HEALTH CARE EDUCATION/TRAINING PROGRAM

## 2025-07-22 PROCEDURE — G8427 DOCREV CUR MEDS BY ELIG CLIN: HCPCS | Performed by: STUDENT IN AN ORGANIZED HEALTH CARE EDUCATION/TRAINING PROGRAM

## 2025-07-22 ASSESSMENT — ENCOUNTER SYMPTOMS
COUGH: 0
VOMITING: 0
ABDOMINAL DISTENTION: 0
BACK PAIN: 0
COLOR CHANGE: 0
SORE THROAT: 0
EYE ITCHING: 0
WHEEZING: 0
ABDOMINAL PAIN: 0
EYE DISCHARGE: 0
TROUBLE SWALLOWING: 0
SHORTNESS OF BREATH: 0
EYE REDNESS: 0
EYE PAIN: 0
CONSTIPATION: 0
NAUSEA: 0
STRIDOR: 0
DIARRHEA: 0

## 2025-07-22 NOTE — PROGRESS NOTES
MA Communication:  The following orders are received by verbal communication from Charles Goldsmith MD    Orders include:  1 yr pulm f/u

## 2025-07-22 NOTE — TELEPHONE ENCOUNTER
Patient called stating that she needs a refill on her blood thinner Eliquis 5mg, stated that Dr. Goldsmith prescribed it to her when she was in the Hospital,[facility]. please advise.

## 2025-07-22 NOTE — TELEPHONE ENCOUNTER
Patient contacted and related MD refill for Eliquis, also advise for further refill she should contact her PCP ,pt was verbalize understanding and was agreeable.

## 2025-07-22 NOTE — PROGRESS NOTES
[facility] Pulmonary Follow-up  [address] [place] [address]  [phone]        Amada Daniels (: 1945 ) is a 80 y.o. female here for an evaluation of   Chief Complaint   Patient presents with    Follow-up    Shortness of Breath         SUBJECTIVE/OBJECTIVE:    Patient is a 80-year-old female with significant past medical history of PE, pulmonary hypertension that presents to [facility] pulmonary clinic for follow-up visit.  Patient has a history of pulmonary embolism with CDT therapy.  She is on Eliquis at home.  She was started on fluticasone with improvement in her symptoms.  She has a albuterol inhaler as needed for shortness of breath.  She says she does not use her rescue Hailer.  She has no other complaints today.      Review of Systems   Constitutional:  Negative for activity change, appetite change, chills, diaphoresis and fatigue.   HENT:  Negative for congestion, sore throat and trouble swallowing.    Eyes:  Negative for pain, discharge, redness and itching.   Respiratory:  Negative for cough, shortness of breath, wheezing and stridor.    Cardiovascular:  Negative for chest pain, palpitations and leg swelling.   Gastrointestinal:  Negative for abdominal distention, abdominal pain, constipation, diarrhea, nausea and vomiting.   Endocrine: Negative for polydipsia, polyphagia and polyuria.   Genitourinary:  Negative for difficulty urinating.   Musculoskeletal:  Negative for back pain, myalgias and neck pain.   Skin:  Negative for color change.   Neurological:  Negative for dizziness, weakness and light-headedness.   Psychiatric/Behavioral:  Negative for agitation and behavioral problems.          Vitals:    25 0901   BP: (!) 168/89   Pulse: 82   Resp: 16   Temp: 98 °F (36.7 °C)   TempSrc: Temporal   SpO2: 98%   Weight: 88 kg (194 lb)   Height: 1.575 m (5' 2\")        Physical Exam  Constitutional:       General: She is not in acute distress.     Appearance: She is not

## 2025-07-28 ENCOUNTER — OFFICE VISIT (OUTPATIENT)
Dept: INTERNAL MEDICINE CLINIC | Age: 80
End: 2025-07-28
Payer: MEDICARE

## 2025-07-28 VITALS
TEMPERATURE: 97.7 F | BODY MASS INDEX: 35.67 KG/M2 | WEIGHT: 195 LBS | SYSTOLIC BLOOD PRESSURE: 149 MMHG | OXYGEN SATURATION: 98 % | HEART RATE: 98 BPM | DIASTOLIC BLOOD PRESSURE: 96 MMHG

## 2025-07-28 DIAGNOSIS — R23.8 REDNESS AND SWELLING OF FOREARM: Primary | ICD-10-CM

## 2025-07-28 DIAGNOSIS — M79.89 REDNESS AND SWELLING OF FOREARM: Primary | ICD-10-CM

## 2025-07-28 PROCEDURE — 3079F DIAST BP 80-89 MM HG: CPT

## 2025-07-28 PROCEDURE — G8417 CALC BMI ABV UP PARAM F/U: HCPCS

## 2025-07-28 PROCEDURE — 3077F SYST BP >= 140 MM HG: CPT

## 2025-07-28 PROCEDURE — G8427 DOCREV CUR MEDS BY ELIG CLIN: HCPCS

## 2025-07-28 PROCEDURE — 1123F ACP DISCUSS/DSCN MKR DOCD: CPT

## 2025-07-28 PROCEDURE — G8399 PT W/DXA RESULTS DOCUMENT: HCPCS

## 2025-07-28 PROCEDURE — 99213 OFFICE O/P EST LOW 20 MIN: CPT

## 2025-07-28 PROCEDURE — 1159F MED LIST DOCD IN RCRD: CPT

## 2025-07-28 PROCEDURE — 1090F PRES/ABSN URINE INCON ASSESS: CPT

## 2025-07-28 PROCEDURE — 1036F TOBACCO NON-USER: CPT

## 2025-07-28 RX ORDER — DIPHENHYDRAMINE HCL 25 MG
25 CAPSULE ORAL EVERY 8 HOURS PRN
Qty: 30 CAPSULE | Refills: 0 | Status: SHIPPED | OUTPATIENT
Start: 2025-07-28 | End: 2025-08-07

## 2025-07-28 NOTE — PATIENT INSTRUCTIONS
- Augmentin 1 tablet twice a day for the next 7 days  - Have also sent a prescription for Benadryl 1 tablet every 8 hours as needed for itching.  Be aware it can cause drowsiness so avoid driving with this or operating heavy machinery    - Expect your arm to improve by the end of this week, if not let me know and we will reevaluate

## 2025-07-28 NOTE — PROGRESS NOTES
[facility] IM Office  [address]  [address]  [place], [place] [address]  Tel: [phone]    Amada Daniels  1945  female    CC:   Chief Complaint   Patient presents with    Arm Swelling       HPI:     Presents for acute visit.    States that on Friday she noticed that her right arm was beginning to swell and become itchy.  Throughout the weekend its become more swollen as well as more itchy.  Denies any significant pain.  Thinks it may be from a spider bite although she did not notice any insect/spiders.    Has been compliant with her Eliquis.  Denies fevers or chills.  No recent trauma to the arm.    Past Medical History:   Diagnosis Date    Hx of blood clots 2015    Hypertension        Past Surgical History:   Procedure Laterality Date    COLONOSCOPY  2006    COLONOSCOPY  4/19/16    colon polyp    EYE SURGERY      cataracts bilateral    HYSTERECTOMY (CERVIX STATUS UNKNOWN)         Family History   Problem Relation Age of Onset    Cancer Mother     Breast Cancer Mother     Coronary Art Dis Father         No Known Allergies    Immunization History   Administered Date(s) Administered    COVID-19, MODERNA BLUE border, Primary or Immunocompromised, (age 12y+), IM, 100 mcg/0.5mL 02/23/2021, 03/23/2021, 11/10/2021    COVID-19, PFIZER Bivalent, DO NOT Dilute, (age 12y+), IM, 30 mcg/0.3 mL 10/13/2022    Influenza Vaccine, unspecified formulation 09/06/2016    Influenza Virus Vaccine 09/06/2016    Influenza Whole 09/01/2014    Influenza, FLUAD, (age 65 y+), IM, Quadv, 0.5mL 10/01/2020    Influenza, FLUAD, (age 65 y+), IM, Trivalent PF, 0.5mL 09/10/2024    Influenza, FLUZONE High Dose (age 65 y+), IM, Quadv, 0.7mL 09/26/2021    Influenza, FLUZONE High Dose, (age 65 y+), IM, Trivalent PF, 0.5mL 09/18/2014, 09/15/2015, 08/31/2016, 09/11/2017, 09/20/2018, 10/07/2019    Pneumococcal, PCV-13, PREVNAR 13, (age 6w+), IM, 0.5mL 03/30/2016    Pneumococcal, PPSV23, PNEUMOVAX 23, (age 2y+), SC/IM, 0.5mL 07/12/2018